# Patient Record
Sex: FEMALE | Race: WHITE | Employment: OTHER | URBAN - METROPOLITAN AREA
[De-identification: names, ages, dates, MRNs, and addresses within clinical notes are randomized per-mention and may not be internally consistent; named-entity substitution may affect disease eponyms.]

---

## 2017-03-01 ENCOUNTER — OFFICE VISIT (OUTPATIENT)
Dept: CARDIOLOGY CLINIC | Age: 67
End: 2017-03-01

## 2017-03-01 VITALS
HEART RATE: 89 BPM | BODY MASS INDEX: 33.23 KG/M2 | HEIGHT: 61 IN | SYSTOLIC BLOOD PRESSURE: 138 MMHG | WEIGHT: 176 LBS | DIASTOLIC BLOOD PRESSURE: 81 MMHG

## 2017-03-01 DIAGNOSIS — I50.32 DIASTOLIC CHF, CHRONIC (HCC): ICD-10-CM

## 2017-03-01 DIAGNOSIS — I50.31 ACUTE DIASTOLIC CHF (CONGESTIVE HEART FAILURE) (HCC): Primary | ICD-10-CM

## 2017-03-01 DIAGNOSIS — E66.9 OBESITY (BMI 30.0-34.9): ICD-10-CM

## 2017-03-01 DIAGNOSIS — I10 ESSENTIAL HYPERTENSION: ICD-10-CM

## 2017-03-01 DIAGNOSIS — E78.00 PURE HYPERCHOLESTEROLEMIA: ICD-10-CM

## 2017-03-01 RX ORDER — NEBIVOLOL 10 MG/1
10 TABLET ORAL DAILY
Qty: 30 TAB | Refills: 1 | Status: SHIPPED | OUTPATIENT
Start: 2017-03-01 | End: 2017-09-08

## 2017-03-01 RX ORDER — AA/PROT/LYSINE/METHIO/VIT C/B6 50-12.5 MG
TABLET ORAL
COMMUNITY
End: 2017-10-19

## 2017-03-01 RX ORDER — ROSUVASTATIN CALCIUM 5 MG/1
5 TABLET, COATED ORAL
Qty: 30 TAB | Refills: 3 | Status: SHIPPED | OUTPATIENT
Start: 2017-03-01 | End: 2017-10-19

## 2017-03-01 NOTE — MR AVS SNAPSHOT
Visit Information Date & Time Provider Department Dept. Phone Encounter #  
 3/1/2017  8:30 AM Jesse Way MD [facility] [address] [place] [address] [id number] Follow-up Instructions Return in about 6 months (around 9/1/2017), or if symptoms worsen or fail to improve. Your Appointments 9/14/2017  8:00 AM  
ESTABLISHED PATIENT with Jesse Way MD  
[facility]) Appt Note: 6 months post labs [address] [facility], [address] [facility] [address]  
[address] [facility], [address] [place] [address] Upcoming Health Maintenance Date Due Hepatitis C Screening 1950 DTaP/Tdap/Td series (1 - Tdap) 8/25/1972 BREAST CANCER SCRN MAMMOGRAM 8/25/2001 ZOSTER VACCINE AGE 60> 8/25/2011 GLAUCOMA SCREENING Q2Y 8/25/2016 OSTEOPOROSIS SCREENING (DEXA) 8/25/2016 Pneumococcal 65+ Low/Medium Risk (1 of 2 - PCV13) 8/25/2016 MEDICARE YEARLY EXAM 8/25/2016 COLONOSCOPY 10/17/2022 Allergies as of 3/1/2017  Review Complete On: 3/1/2017 By: Jesse Way MD  
  
 Severity Noted Reaction Type Reactions Lipitor [Atorvastatin]  03/01/2017    Myalgia Quit 2/17 Nsaids (Non-steroidal Anti-inflammatory Drug)  10/17/2012    Other (comments) Severe abdominal pain  
 Pcn [Penicillins]  10/17/2012    Swelling Swelling, hives & photosensitive rxn Current Immunizations  Reviewed on 10/28/2016 Name Date Influenza Vaccine 9/19/2016 Poliovirus vaccine 5/14/1998 Not reviewed this visit You Were Diagnosed With   
  
 Codes Comments Acute diastolic CHF (congestive heart failure) (HCC)    -  Primary ICD-10-CM: I50.31 ICD-9-CM: 428.31, 428.0 3/17 resolved; 11/16 resolved clinically by reducing fluid intake Diastolic CHF, chronic (HCC)     ICD-10-CM: I50.32 
ICD-9-CM: 428.32, 428.0  Pure hypercholesterolemia     ICD-10-CM: E78.00 
 ICD-9-CM: 272.0 3/17 try crestor; 5/15 VKJ56; 2378 [id number] Obesity (BMI 30.0-34.9)     ICD-10-CM: K81.3 ICD-9-CM: 278.00 Weight loss has been strongly encouraged by following dietary restrictions and an exercise routine. Essential hypertension     ICD-10-CM: I10 
ICD-9-CM: 061.7 /48 try bystolic rather than labetolol as she forgets 2 nd dose Vitals BP  
  
  
  
  
  
 138/81 (BP 1 Location: Left arm, BP Patient Position: At rest) Vitals History BMI and BSA Data Body Mass Index Body Surface Area  
 33.25 kg/m 2 1.85 m 2 Preferred Pharmacy Pharmacy Name Phone [address], [phone] [facility] [phone] Your Updated Medication List  
  
   
This list is accurate as of: 3/1/17  9:27 AM.  Always use your most recent med list.  
  
  
  
  
 benazepril 20 mg tablet Commonly known as:  LOTENSIN  
1 tablet  per day CO Q-10 10 mg Cap Generic drug:  coenzyme q10 Take  by mouth. DEXILANT 60 mg Cpdb Generic drug:  Dexlansoprazole Take  by mouth Daily (before breakfast). GAVISCON EXTRA STRENGTH 160-105 mg Jenean Parrot Generic drug:  aluminum hydrox-magnesium carb Take 2 Tabs by mouth as needed. LORazepam 0.5 mg tablet Commonly known as:  ATIVAN Take 1 Tab by mouth two (2) times a day. Max Daily Amount: 1 mg.  
  
 nebivolol 10 mg tablet Commonly known as:  BYSTOLIC Take 1 Tab by mouth daily. OMEGA 3 PO Take  by mouth. RESTASIS 0.05 % ophthalmic emulsion Generic drug:  cycloSPORINE Administer 1 Drop to both eyes two (2) times a day. rosuvastatin 5 mg tablet Commonly known as:  CRESTOR Take 1 Tab by mouth nightly. TYLENOL EXTRA STRENGTH 500 mg tablet Generic drug:  acetaminophen Take 1,000 mg by mouth every six (6) hours as needed. Prescriptions Sent to Pharmacy  Refills  
 nebivolol (BYSTOLIC) 10 mg tablet 1  
 Sig: Take 1 Tab by mouth daily. Class: Normal  
 Pharmacy: [address], [address] [place] [address] Ph #: [phone] Route: Oral  
 rosuvastatin (CRESTOR) 5 mg tablet 3 Sig: Take 1 Tab by mouth nightly. Class: Normal  
 Pharmacy: [address], [address] [place] [place] [address] Ph #: [phone] Route: Oral  
  
We Performed the Following [facility] [employer] BP FLOWSHEET [[id number] CPT(R)] Follow-up Instructions Return in about 6 months (around 9/1/2017), or if symptoms worsen or fail to improve. To-Do List   
 Around 04/05/2017 Lab:  HEPATIC FUNCTION PANEL Around 04/05/2017 Lab:  LIPID PANEL Patient Instructions After the recommended changes have been made in blood pressure medicines, patient advised to keep BP/HR(pulse rate) chart twice daily and bring us results in next 2 weeks or so. Patient may send the results via \"My Chart\" if desired. Please rest for 5-10 minutes before checking blood pressure Medications Discontinued During This Encounter Medication Reason  aspirin delayed-release 81 mg tablet Therapy Completed  atorvastatin (LIPITOR) 40 mg tablet Side Effects  labetalol (NORMODYNE) 200 mg tablet Other Orders Placed This Encounter  LIPID PANEL Standing Status:   Future Standing Expiration Date:   9/1/2017  
 HEPATIC FUNCTION PANEL Standing Status:   Future Standing Expiration Date:   9/1/2017  
 [facility] [employer] BP FLOWSHEET Order Specific Question:   After how many readings would you like to receive a notification of this patient's entries? Answer:   14 Order Specific Question:   What is the highest normal systolic value for this patient? (An add'l alert will be sent if value exceeds this range.) Answer:   180   Order Specific Question:   What is the highest normal diastolic value for this patient? (An add'l alert will be sent if values exceed this range.) Answer:   90  
 nebivolol (BYSTOLIC) 10 mg tablet Sig: Take 1 Tab by mouth daily. Dispense:  30 Tab Refill:  1  rosuvastatin (CRESTOR) 5 mg tablet Sig: Take 1 Tab by mouth nightly. Dispense:  30 Tab Refill:  3 Body Mass Index: Care Instructions Your Care Instructions Body mass index (BMI) can help you see if your weight is raising your risk for health problems. It uses a formula to compare how much you weigh with how tall you are. A BMI between 18.5 and 24.9 is considered healthy. A BMI between 25 and 29.9 is considered overweight. A BMI of 30 or higher is considered obese. If your BMI is in the normal range, it means that you have a lower risk for weight-related health problems. If your BMI is in the overweight or obese range, you may be at increased risk for weight-related health problems, such as high blood pressure, heart disease, stroke, arthritis or joint pain, and diabetes. BMI is just one measure of your risk for weight-related health problems. You may be at higher risk for health problems if you are not active, you eat an unhealthy diet, or you drink too much alcohol or use tobacco products. Follow-up care is a key part of your treatment and safety. Be sure to make and go to all appointments, and call your doctor if you are having problems. It's also a good idea to know your test results and keep a list of the medicines you take. How can you care for yourself at home? · Practice healthy eating habits. This includes eating plenty of fruits, vegetables, whole grains, lean protein, and low-fat dairy. · Get at least 30 minutes of exercise 5 days a week or more. Brisk walking is a good choice. You also may want to do other activities, such as running, swimming, cycling, or playing tennis or team sports. · Do not smoke. Smoking can increase your risk for health problems.  If you need help quitting, talk to your doctor about stop-smoking programs and medicines. These can increase your chances of quitting for good. · Limit alcohol to 2 drinks a day for men and 1 drink a day for women. Too much alcohol can cause health problems. If you have a BMI higher than 25 · Your doctor may do other tests to check your risk for weight-related health problems. This may include measuring the distance around your waist. A waist measurement of more than 40 inches in men or 35 inches in women can increase the risk of weight-related health problems. · Talk with your doctor about steps you can take to stay healthy or improve your health. You may need to make lifestyle changes to lose weight and stay healthy, such as changing your diet and getting regular exercise. Where can you learn more? Go to http://christina-jose de jesus.info/. Enter S176 in the search box to learn more about \"Body Mass Index: Care Instructions. \" Current as of: February 16, 2016 Content Version: 11.1 © 9409-1325 [employer]. Care instructions adapted under license by [employer] (which disclaims liability or warranty for this information). If you have questions about a medical condition or this instruction, always ask your healthcare professional. [place] [address] any warranty or liability for your use of this information. Introducing [facility]! Dear Sarahy Fernandez: Thank you for requesting a OONi account. Our records indicate that you already have an active OONi account. You can access your account anytime at [email or web address]. [facility]/[employer] Did you know that you can access your hospital and ER discharge instructions at any time in OONi? You can also review all of your test results from your hospital stay or ER visit. Additional Information If you have questions, please visit the Frequently Asked Questions section of the OONi website at [email or web address]. [facility]/[employer]/. Remember, [employer] is NOT to be used for urgent needs. For medical emergencies, dial 911. Now available from your iPhone and Android! Please provide this summary of care documentation to your next provider. Your primary care clinician is listed as Wendy Jo. If you have any questions after today's visit, please call [phone].

## 2017-03-01 NOTE — PROGRESS NOTES
HISTORY OF PRESENT ILLNESS  Rhea Miller is a 73 y.o. female. HPI Comments: 11/16 seen for abn EKG, h/o 45-50%EF; had jaw pain and left neck discomfort- worse with bending over  10/16 had SOB/edema- improved now. Had high BP which is being treated now. Was drinking extra fluids due to renal stone which has now been passed and will not need lithotripsy now        Cholesterol Problem   The history is provided by the medical records. This is a chronic problem. Associated symptoms include shortness of breath. Hypertension   The history is provided by the patient. This is a new problem. The current episode started more than 1 week ago (treatment started 10/16 again after some yrs). Associated symptoms include shortness of breath. Leg Swelling   The history is provided by the patient. This is a chronic problem. The current episode started more than 1 week ago. The problem occurs daily. Associated symptoms include shortness of breath. The symptoms are aggravated by standing. The symptoms are relieved by sleep. Shortness of Breath   The history is provided by the patient. This is a new problem. The problem occurs rarely (3 times/yr). The current episode started more than 1 week ago (yrs). The problem has not changed since onset. Pertinent negatives include no fever, no cough, no wheezing, no PND, no orthopnea, no vomiting, no rash, no leg swelling and no claudication. The problem's precipitants include exercise (walking across parking lot; 11/16 improving; 3/17 2flights but can walk >2 miles). Review of Systems   Constitutional: Negative for chills, fever, malaise/fatigue and weight loss. HENT: Negative for nosebleeds. Eyes: Negative for discharge. Respiratory: Positive for shortness of breath. Negative for cough and wheezing. Cardiovascular: Negative for palpitations, orthopnea, claudication, leg swelling and PND. Gastrointestinal: Negative for diarrhea, nausea and vomiting.    Genitourinary: Negative for dysuria and hematuria. Musculoskeletal: Negative for joint pain. Skin: Negative for rash. Neurological: Negative for dizziness, seizures and loss of consciousness. Endo/Heme/Allergies: Negative for polydipsia. Does not bruise/bleed easily. Psychiatric/Behavioral: Negative for depression and substance abuse. The patient does not have insomnia. Allergies   Allergen Reactions    Nsaids (Non-Steroidal Anti-Inflammatory Drug) Other (comments)     Severe abdominal pain    Pcn [Penicillins] Swelling     Swelling, hives & photosensitive rxn       Past Medical History:   Diagnosis Date    Acute reaction to stress     Closed fracture of left wrist     Diastolic dysfunction     Fatty liver     GERD (gastroesophageal reflux disease)     Hyperlipidemia     Hypertension     diastolic dysfunction    Hypoglycemia     Kidney stones     11/16 for yrs; spon passage always so far    RSD (reflex sympathetic dystrophy) 8/2009    no blood pressure or sticks in left arm    Syncope 2000    no recurrence since; was orthostatic at that time    Unspecified adverse effect of anesthesia     hard to sedate       Family History   Problem Relation Age of Onset    Diabetes Maternal Aunt     Cancer Maternal Uncle      throat    Cancer Maternal Grandmother      melanoma    Heart Disease Other     Heart Attack Paternal Grandmother 48    Stroke Neg Hx        Social History   Substance Use Topics    Smoking status: Former Smoker     Packs/day: 0.50     Years: 11.00     Quit date: 1/1/2003    Smokeless tobacco: Never Used    Alcohol use 0.6 oz/week     1 Glasses of wine per week      Comment: rarely- 3-4 drinks/yr        Current Outpatient Prescriptions   Medication Sig    coenzyme q10 (CO Q-10) 10 mg cap Take  by mouth.  FLAXSEED OIL (OMEGA 3 PO) Take  by mouth.  Dexlansoprazole (DEXILANT) 60 mg CpDB Take  by mouth Daily (before breakfast).     cycloSPORINE (RESTASIS) 0.05 % ophthalmic emulsion Administer 1 Drop to both eyes two (2) times a day.  labetalol (NORMODYNE) 200 mg tablet 1 tablet twice per day    benazepril (LOTENSIN) 20 mg tablet 1 tablet  per day    LORazepam (ATIVAN) 0.5 mg tablet Take 1 Tab by mouth two (2) times a day. Max Daily Amount: 1 mg. (Patient taking differently: Take 0.5 mg by mouth as needed.)    aluminum hydrox-magnesium carb (GAVISCON EXTRA STRENGTH) 160-105 mg chew Take 2 Tabs by mouth as needed.  acetaminophen (TYLENOL EXTRA STRENGTH) 500 mg tablet Take 1,000 mg by mouth every six (6) hours as needed. No current facility-administered medications for this visit. Past Surgical History:   Procedure Laterality Date    HX CARPAL TUNNEL RELEASE      HX GYN      lap. fibroid removal    HX HEART CATHETERIZATION  2005 approx    normal coronaries per pt    HX ORTHOPAEDIC      ORIF left wrist    HX SHOULDER ARTHROSCOPY Left 1988       Diagnostic Studies: Old records reviewed and show as follows  EKG tracings reviewed by me today. No flowsheet data found. 11/16 Nuc Stress  Conclusion:    1. Normal perfusion scan.    2. Normal wall motion and ejection fraction.    3. Low risk scan. Visit Vitals    /81 (BP 1 Location: Left arm, BP Patient Position: At rest)    Pulse 89    Ht 5' 1\" (1.549 m)    Wt 79.8 kg (176 lb)    BMI 33.25 kg/m2       Ms. Silvano De La Rosa has a reminder for a \"due or due soon\" health maintenance. I have asked that she contact her primary care provider for follow-up on this health maintenance. Physical Exam   Constitutional: She is oriented to person, place, and time. She appears well-developed and well-nourished. No distress. obese   HENT:   Head: Normocephalic and atraumatic. Mouth/Throat: Normal dentition. Eyes: Right eye exhibits no discharge. Left eye exhibits no discharge. No scleral icterus. Neck: Neck supple. No JVD present. Carotid bruit is not present. No thyromegaly present.    Cardiovascular: Normal rate, regular rhythm, S1 normal, S2 normal, normal heart sounds and intact distal pulses. Exam reveals no gallop and no friction rub. No murmur heard. Pulmonary/Chest: Effort normal and breath sounds normal. She has no wheezes. She has no rales. Abdominal: Soft. She exhibits no mass. There is no tenderness. Musculoskeletal: She exhibits no edema. Lymphadenopathy:        Right cervical: No superficial cervical adenopathy present. Left cervical: No superficial cervical adenopathy present. Neurological: She is alert and oriented to person, place, and time. Skin: Skin is warm and dry. No rash noted. Psychiatric: She has a normal mood and affect. Her behavior is normal.       ASSESSMENT and PLAN        Derrick King was seen today for cholesterol problem, hypertension, leg swelling and shortness of breath. Diagnoses and all orders for this visit:    Acute diastolic CHF (congestive heart failure) ([address]  Comments:  3/17 resolved; 11/16 resolved clinically by reducing fluid intake      Diastolic CHF, chronic ([facility]    Pure hypercholesterolemia  Comments:  3/17 try crestor; 5/15 LDL92; 2014 [id number]  Orders:  -     rosuvastatin (CRESTOR) 5 mg tablet; Take 1 Tab by mouth nightly. -     LIPID PANEL; Future  -     HEPATIC FUNCTION PANEL; Future    Obesity (BMI 30.0-34. 9)  Comments:  Weight loss has been strongly encouraged by following dietary restrictions and an exercise routine. Essential hypertension  Comments:  /55 try bystolic rather than labetolol as she forgets 2 nd dose  Orders:  -     nebivolol (BYSTOLIC) 10 mg tablet; Take 1 Tab by mouth daily.  -     [facility] BP FLOWSHEET        Pertinent laboratory and test data reviewed and discussed with patient.   See patient instructions also for other medical advice given    Medications Discontinued During This Encounter   Medication Reason    aspirin delayed-release 81 mg tablet Therapy Completed    atorvastatin (LIPITOR) 40 mg tablet Side Effects    labetalol (NORMODYNE) 200 mg tablet Other       Follow-up Disposition:  Return in about 6 months (around 9/1/2017), or if symptoms worsen or fail to improve.

## 2017-03-01 NOTE — PATIENT INSTRUCTIONS
After the recommended changes have been made in blood pressure medicines, patient advised to keep BP/HR(pulse rate) chart twice daily and bring us results in next 2 weeks or so. Patient may send the results via \"My Chart\" if desired. Please rest for 5-10 minutes before checking blood pressure  Medications Discontinued During This Encounter   Medication Reason    aspirin delayed-release 81 mg tablet Therapy Completed    atorvastatin (LIPITOR) 40 mg tablet Side Effects    labetalol (NORMODYNE) 200 mg tablet Other       Orders Placed This Encounter    LIPID PANEL     Standing Status:   Future     Standing Expiration Date:   9/1/2017    HEPATIC FUNCTION PANEL     Standing Status:   Future     Standing Expiration Date:   9/1/2017    [facility] BP FLOWSHEET     Order Specific Question:   After how many readings would you like to receive a notification of this patient's entries? Answer:   15     Order Specific Question:   What is the highest normal systolic value for this patient? (An add'l alert will be sent if value exceeds this range.)     Answer:   180     Order Specific Question:   What is the highest normal diastolic value for this patient? (An add'l alert will be sent if values exceed this range.)     Answer:   90    nebivolol (BYSTOLIC) 10 mg tablet     Sig: Take 1 Tab by mouth daily. Dispense:  30 Tab     Refill:  1    rosuvastatin (CRESTOR) 5 mg tablet     Sig: Take 1 Tab by mouth nightly. Dispense:  30 Tab     Refill:  3          Body Mass Index: Care Instructions  Your Care Instructions    Body mass index (BMI) can help you see if your weight is raising your risk for health problems. It uses a formula to compare how much you weigh with how tall you are. A BMI between 18.5 and 24.9 is considered healthy. A BMI between 25 and 29.9 is considered overweight. A BMI of 30 or higher is considered obese.   If your BMI is in the normal range, it means that you have a lower risk for weight-related health problems. If your BMI is in the overweight or obese range, you may be at increased risk for weight-related health problems, such as high blood pressure, heart disease, stroke, arthritis or joint pain, and diabetes. BMI is just one measure of your risk for weight-related health problems. You may be at higher risk for health problems if you are not active, you eat an unhealthy diet, or you drink too much alcohol or use tobacco products. Follow-up care is a key part of your treatment and safety. Be sure to make and go to all appointments, and call your doctor if you are having problems. It's also a good idea to know your test results and keep a list of the medicines you take. How can you care for yourself at home? · Practice healthy eating habits. This includes eating plenty of fruits, vegetables, whole grains, lean protein, and low-fat dairy. · Get at least 30 minutes of exercise 5 days a week or more. Brisk walking is a good choice. You also may want to do other activities, such as running, swimming, cycling, or playing tennis or team sports. · Do not smoke. Smoking can increase your risk for health problems. If you need help quitting, talk to your doctor about stop-smoking programs and medicines. These can increase your chances of quitting for good. · Limit alcohol to 2 drinks a day for men and 1 drink a day for women. Too much alcohol can cause health problems. If you have a BMI higher than 25  · Your doctor may do other tests to check your risk for weight-related health problems. This may include measuring the distance around your waist. A waist measurement of more than 40 inches in men or 35 inches in women can increase the risk of weight-related health problems. · Talk with your doctor about steps you can take to stay healthy or improve your health. You may need to make lifestyle changes to lose weight and stay healthy, such as changing your diet and getting regular exercise.   Where can you learn more?  Go to http://christina-jose de jesus.info/. Enter S176 in the search box to learn more about \"Body Mass Index: Care Instructions. \"  Current as of: February 16, 2016  Content Version: 11.1  © 7014-2076 [employer]. Care instructions adapted under license by [employer] (which disclaims liability or warranty for this information). If you have questions about a medical condition or this instruction, always ask your healthcare professional. [address] any warranty or liability for your use of this information.

## 2017-03-01 NOTE — LETTER
Gonzalo Tamara 1950 3/1/2017 Dear Cass Feliciano MD 
 
I had the pleasure of evaluating  Ms. Beatrice Villagran in office today. Below are the relevant portions of my assessment and plan of care. ICD-10-CM ICD-9-CM 1. Acute diastolic CHF (congestive heart failure) ([address] I50.31 428.31   
  428.0   
 3/17 resolved; 11/16 resolved clinically by reducing fluid intake 2. Diastolic CHF, chronic (HCC) I50.32 428.32   
  428.0 3. Pure hypercholesterolemia E78.00 272.0 rosuvastatin (CRESTOR) 5 mg tablet LIPID PANEL  
   HEPATIC FUNCTION PANEL  
 3/17 try crestor; 5/15 LDL92; 2014 [id number]  
4. Obesity (BMI 30.0-34. 9) E66.9 278.00 Weight loss has been strongly encouraged by following dietary restrictions and an exercise routine. 5. Essential hypertension I10 401.9 nebivolol (BYSTOLIC) 10 mg tablet [facility]  
 /37 try bystolic rather than labetolol as she forgets 2 nd dose Current Outpatient Prescriptions Medication Sig Dispense Refill  coenzyme q10 (CO Q-10) 10 mg cap Take  by mouth.  nebivolol (BYSTOLIC) 10 mg tablet Take 1 Tab by mouth daily. 30 Tab 1  
 rosuvastatin (CRESTOR) 5 mg tablet Take 1 Tab by mouth nightly. 30 Tab 3  FLAXSEED OIL (OMEGA 3 PO) Take  by mouth.  Dexlansoprazole (DEXILANT) 60 mg CpDB Take  by mouth Daily (before breakfast).  cycloSPORINE (RESTASIS) 0.05 % ophthalmic emulsion Administer 1 Drop to both eyes two (2) times a day.  benazepril (LOTENSIN) 20 mg tablet 1 tablet  per day 30 Tab 2  
 LORazepam (ATIVAN) 0.5 mg tablet Take 1 Tab by mouth two (2) times a day. Max Daily Amount: 1 mg. (Patient taking differently: Take 0.5 mg by mouth as needed.) 60 Tab 3  
 aluminum hydrox-magnesium carb (GAVISCON EXTRA STRENGTH) 160-105 mg chew Take 2 Tabs by mouth as needed.  acetaminophen (TYLENOL EXTRA STRENGTH) 500 mg tablet Take 1,000 mg by mouth every six (6) hours as needed. Orders Placed This Encounter  LIPID PANEL Standing Status:   Future Standing Expiration Date:   9/1/2017  
 HEPATIC FUNCTION PANEL Standing Status:   Future Standing Expiration Date:   9/1/2017  
 BSI MYCHART BP FLOWSHEET Order Specific Question:   After how many readings would you like to receive a notification of this patient's entries? Answer:   14 Order Specific Question:   What is the highest normal systolic value for this patient? (An add'l alert will be sent if value exceeds this range.) Answer:   180 Order Specific Question:   What is the highest normal diastolic value for this patient? (An add'l alert will be sent if values exceed this range.) Answer:   90  
 coenzyme q10 (CO Q-10) 10 mg cap Sig: Take  by mouth.  nebivolol (BYSTOLIC) 10 mg tablet Sig: Take 1 Tab by mouth daily. Dispense:  30 Tab Refill:  1  rosuvastatin (CRESTOR) 5 mg tablet Sig: Take 1 Tab by mouth nightly. Dispense:  30 Tab Refill:  3 If you have questions, please do not hesitate to call me. I look forward to following Ms. Claus Benito along with you. Sincerely, Jesse Way MD

## 2017-03-01 NOTE — PROGRESS NOTES
1. Have you been to the ER, urgent care clinic since your last visit? Hospitalized since your last visit? No    2. Have you seen or consulted any other health care providers outside of the [facility] since your last visit? Include any pap smears or colon screening. No     3. Since your last visit, have you had any of the following symptoms? SOB/ swelling in legs      4. Have you had any blood work, X-rays or cardiac testing?     Patient did not completed Lipid and LFT / will need new orders         Medications confirmed verbally by patient

## 2017-04-05 DIAGNOSIS — E78.00 PURE HYPERCHOLESTEROLEMIA: ICD-10-CM

## 2017-04-28 RX ORDER — BENAZEPRIL HYDROCHLORIDE 20 MG/1
TABLET ORAL
Qty: 30 TAB | Refills: 0 | Status: SHIPPED | OUTPATIENT
Start: 2017-04-28 | End: 2017-06-19 | Stop reason: SDUPTHER

## 2017-05-02 RX ORDER — ALBUTEROL SULFATE 90 UG/1
1 AEROSOL, METERED RESPIRATORY (INHALATION)
Qty: 1 INHALER | Refills: 5 | Status: SHIPPED | OUTPATIENT
Start: 2017-05-02 | End: 2019-05-31 | Stop reason: ALTCHOICE

## 2017-05-02 NOTE — TELEPHONE ENCOUNTER
Patient states she is having allergy issues and is having bronchospasms. She is requesting albuterol inhaler. She has used this in the past when this has happened, but it's been a few years. She would like it sent to [facility].

## 2017-06-09 ENCOUNTER — TELEPHONE (OUTPATIENT)
Dept: INTERNAL MEDICINE CLINIC | Age: 67
End: 2017-06-09

## 2017-06-09 RX ORDER — PREDNISONE 20 MG/1
TABLET ORAL
Qty: 20 TAB | Refills: 0 | Status: SHIPPED | OUTPATIENT
Start: 2017-06-09 | End: 2017-09-08

## 2017-06-09 RX ORDER — GABAPENTIN 300 MG/1
CAPSULE ORAL
Qty: 60 CAP | Refills: 0 | Status: SHIPPED | OUTPATIENT
Start: 2017-06-09 | End: 2017-09-08

## 2017-06-09 NOTE — TELEPHONE ENCOUNTER
Patient states she is having sciatic pain down left leg. She is going to [facility] to see family for a week and will be doing a lot of walking. She is requesting a short term prescription for prednisone and Neurontin to be sent to [facility]. She has taking these in the past for her wrist.  She is also using ice and heat.

## 2017-06-19 RX ORDER — LABETALOL 200 MG/1
TABLET, FILM COATED ORAL
Qty: 60 TAB | Refills: 0 | Status: SHIPPED | OUTPATIENT
Start: 2017-06-19 | End: 2017-07-18 | Stop reason: SDUPTHER

## 2017-06-19 RX ORDER — BENAZEPRIL HYDROCHLORIDE 20 MG/1
TABLET ORAL
Qty: 30 TAB | Refills: 0 | Status: SHIPPED | OUTPATIENT
Start: 2017-06-19 | End: 2017-07-24 | Stop reason: SDUPTHER

## 2017-06-22 NOTE — TELEPHONE ENCOUNTER
Verbal order received for clindamycin 300 tid  #20 from Dr Anamaria Medley order read back and confirmed.

## 2017-06-22 NOTE — TELEPHONE ENCOUNTER
Patient states her dog nipped her last night and she has 2 puncture bites on her hand. She cleaned it and put antibiotic ointment on it. She's concerned her immune system is suppressed because she just finished taking a large round of prednisone for sciatic pain. She thinks she may need an antibiotic.

## 2017-06-26 RX ORDER — CLINDAMYCIN HYDROCHLORIDE 300 MG/1
300 CAPSULE ORAL 3 TIMES DAILY
Qty: 20 CAP | Refills: 0 | OUTPATIENT
Start: 2017-06-26 | End: 2017-07-03

## 2017-07-18 ENCOUNTER — TELEPHONE (OUTPATIENT)
Dept: CARDIOLOGY CLINIC | Age: 67
End: 2017-07-18

## 2017-07-18 DIAGNOSIS — I50.32 CHRONIC DIASTOLIC CONGESTIVE HEART FAILURE (HCC): Primary | ICD-10-CM

## 2017-07-18 RX ORDER — LABETALOL 200 MG/1
TABLET, FILM COATED ORAL
Qty: 60 TAB | Refills: 3 | Status: SHIPPED | OUTPATIENT
Start: 2017-07-18 | End: 2018-04-12 | Stop reason: SDUPTHER

## 2017-07-18 RX ORDER — FUROSEMIDE 20 MG/1
20 TABLET ORAL
Qty: 30 TAB | Refills: 2 | Status: SHIPPED | OUTPATIENT
Start: 2017-07-18 | End: 2018-07-06 | Stop reason: SDUPTHER

## 2017-07-18 NOTE — TELEPHONE ENCOUNTER
Pt states you told her she could take Lasix as needed but don't see documentation in chart. Please advise, and if so, she will need a refill sent to [facility] pharmacy.

## 2017-07-24 RX ORDER — BENAZEPRIL HYDROCHLORIDE 20 MG/1
TABLET ORAL
Qty: 30 TAB | Refills: 0 | Status: SHIPPED | OUTPATIENT
Start: 2017-07-24 | End: 2017-07-25 | Stop reason: SINTOL

## 2017-07-25 DIAGNOSIS — I10 ESSENTIAL HYPERTENSION: Primary | ICD-10-CM

## 2017-07-25 RX ORDER — BENAZEPRIL HYDROCHLORIDE 20 MG/1
TABLET ORAL
Qty: 30 TAB | Refills: 0 | OUTPATIENT
Start: 2017-07-25

## 2017-07-25 RX ORDER — LOSARTAN POTASSIUM 100 MG/1
100 TABLET ORAL DAILY
Qty: 30 TAB | Refills: 5 | Status: SHIPPED | OUTPATIENT
Start: 2017-07-25 | End: 2018-06-26 | Stop reason: SDUPTHER

## 2017-09-08 ENCOUNTER — OFFICE VISIT (OUTPATIENT)
Dept: INTERNAL MEDICINE CLINIC | Age: 67
End: 2017-09-08

## 2017-09-08 VITALS
RESPIRATION RATE: 16 BRPM | HEART RATE: 88 BPM | SYSTOLIC BLOOD PRESSURE: 120 MMHG | DIASTOLIC BLOOD PRESSURE: 68 MMHG | BODY MASS INDEX: 33.42 KG/M2 | TEMPERATURE: 98.2 F | OXYGEN SATURATION: 98 % | HEIGHT: 61 IN | WEIGHT: 177 LBS

## 2017-09-08 DIAGNOSIS — I10 ESSENTIAL HYPERTENSION: ICD-10-CM

## 2017-09-08 DIAGNOSIS — J06.9 UPPER RESPIRATORY TRACT INFECTION, UNSPECIFIED TYPE: Primary | ICD-10-CM

## 2017-09-08 RX ORDER — CODEINE PHOSPHATE AND GUAIFENESIN 10; 100 MG/5ML; MG/5ML
5 SOLUTION ORAL
Qty: 118 ML | Refills: 0 | Status: SHIPPED | OUTPATIENT
Start: 2017-09-08 | End: 2017-10-30 | Stop reason: ALTCHOICE

## 2017-09-08 RX ORDER — AZITHROMYCIN 250 MG/1
TABLET, FILM COATED ORAL
Qty: 6 TAB | Refills: 0 | Status: SHIPPED | OUTPATIENT
Start: 2017-09-08 | End: 2017-10-19

## 2017-09-08 NOTE — PROGRESS NOTES
Joie Sanabria is a 78 y.o. female presenting today for Nasal Congestion (x8 days)  . HPI:  Joie Sanabria presents to the office today for sore throat, nasal congestion, sinus pressure, facial pain and productive cough x 8 days that is progressively worsening. Review of Systems   Constitutional: Positive for chills. Negative for fever. HENT: Positive for congestion, sinus pain and sore throat. Respiratory: Positive for cough and sputum production. Negative for shortness of breath and wheezing. Cardiovascular: Negative for chest pain and palpitations. Gastrointestinal: Negative for nausea and vomiting. Allergies   Allergen Reactions    Lipitor [Atorvastatin] Myalgia     Quit 2/17    Nsaids (Non-Steroidal Anti-Inflammatory Drug) Other (comments)     Severe abdominal pain    Pcn [Penicillins] Swelling     Swelling, hives & photosensitive rxn       Current Outpatient Prescriptions   Medication Sig Dispense Refill    azithromycin (ZITHROMAX) 250 mg tablet Take 2 tablets today, then take 1 tablet daily 6 Tab 0    guaiFENesin-codeine (ROBITUSSIN AC) 100-10 mg/5 mL solution Take 5 mL by mouth three (3) times daily as needed for Cough. Max Daily Amount: 15 mL. Do not drive if taking this medication 118 mL 0    losartan (COZAAR) 100 mg tablet Take 1 Tab by mouth daily. 30 Tab 5    labetalol (NORMODYNE) 200 mg tablet TAKE ONE TABLET BY MOUTH 2 TIMES A DAY 60 Tab 03    furosemide (LASIX) 20 mg tablet Take 1 Tab by mouth daily as needed. 30 Tab 2    albuterol (PROVENTIL HFA, VENTOLIN HFA, PROAIR HFA) 90 mcg/actuation inhaler Take 1 Puff by inhalation every four (4) hours as needed for Wheezing. 1 Inhaler 5    rosuvastatin (CRESTOR) 5 mg tablet Take 1 Tab by mouth nightly. 30 Tab 3    FLAXSEED OIL (OMEGA 3 PO) Take  by mouth.  Dexlansoprazole (DEXILANT) 60 mg CpDB Take  by mouth Daily (before breakfast).       cycloSPORINE (RESTASIS) 0.05 % ophthalmic emulsion Administer 1 Drop to both eyes two (2) times a day.  LORazepam (ATIVAN) 0.5 mg tablet Take 1 Tab by mouth two (2) times a day. Max Daily Amount: 1 mg. (Patient taking differently: Take 0.5 mg by mouth as needed.) 60 Tab 3    aluminum hydrox-magnesium carb (GAVISCON EXTRA STRENGTH) 160-105 mg chew Take 2 Tabs by mouth as needed.  acetaminophen (TYLENOL EXTRA STRENGTH) 500 mg tablet Take 1,000 mg by mouth every six (6) hours as needed.  coenzyme q10 (CO Q-10) 10 mg cap Take  by mouth. Past Medical History:   Diagnosis Date    Acute reaction to stress     Closed fracture of left wrist     Diastolic dysfunction     Fatty liver     GERD (gastroesophageal reflux disease)     Hyperlipidemia     Hypertension     diastolic dysfunction    Hypoglycemia     Kidney stones     11/16 for yrs; spon passage always so far    RSD (reflex sympathetic dystrophy) 8/2009    no blood pressure or sticks in left arm    Syncope 2000    no recurrence since; was orthostatic at that time    Unspecified adverse effect of anesthesia     hard to sedate       Past Surgical History:   Procedure Laterality Date    HX CARPAL TUNNEL RELEASE      HX GYN      lap. fibroid removal    HX HEART CATHETERIZATION  2005 approx    normal coronaries per pt    HX ORTHOPAEDIC      ORIF left wrist    HX SHOULDER ARTHROSCOPY Left 1988       Social History     Social History    Marital status:      Spouse name: N/A    Number of children: N/A    Years of education: N/A     Occupational History    Not on file.      Social History Main Topics    Smoking status: Former Smoker     Packs/day: 0.50     Years: 11.00     Quit date: 1/1/2003    Smokeless tobacco: Never Used    Alcohol use 0.6 oz/week     1 Glasses of wine per week      Comment: rarely- 3-4 drinks/yr    Drug use: No    Sexual activity: Not Currently     Other Topics Concern    Not on file     Social History Narrative       Patient does not have an advanced directive on file    Vitals: 09/08/17 0817   BP: 120/68   Pulse: 88   Resp: 16   Temp: 98.2 °F (36.8 °C)   TempSrc: Tympanic   SpO2: 98%   Weight: 177 lb (80.3 kg)   Height: 5' 1\" (1.549 m)   PainSc:   3   PainLoc: Face       Physical Exam   Constitutional: No distress. HENT:   Head: Normocephalic. Right Ear: Hearing and tympanic membrane normal.   Left Ear: Hearing and tympanic membrane normal.   Mouth/Throat: Posterior oropharyngeal erythema (mild) present. Neck: Neck supple. Cardiovascular: Normal rate, regular rhythm and normal heart sounds. Pulmonary/Chest: Effort normal and breath sounds normal. No respiratory distress. Lymphadenopathy:     She has no cervical adenopathy. Neurological: She is alert. Nursing note and vitals reviewed. No visits with results within 3 Month(s) from this visit. Latest known visit with results is:    Abstract on 11/01/2016   Component Date Value Ref Range Status    LDL-C, External 10/07/2014 157   Final       .No results found for any visits on 09/08/17. Assessment / Plan:      ICD-10-CM ICD-9-CM    1. Upper respiratory tract infection, unspecified type J06.9 465.9 azithromycin (ZITHROMAX) 250 mg tablet      guaiFENesin-codeine (ROBITUSSIN AC) 100-10 mg/5 mL solution   2. Essential hypertension I10 401.9      Azithromycin rx  Cheratussin rx  Fluids  Rest  Continue Sudafed  Continue Flonase  F/u prn      Follow-up Disposition:  Return if symptoms worsen or fail to improve. I asked the patient if she  had any questions and answered her  questions.   The patient stated that she understands the treatment plan and agrees with the treatment plan

## 2017-09-08 NOTE — PROGRESS NOTES
Patient presents for   Chief Complaint   Patient presents with    Nasal Congestion     x8 days     Fall risk assessment was not indicated. Depression screening was not indicated Follow up questions were not indicated. 1. Have you been to the ER, urgent care clinic since your last visit? Hospitalized since your last visit? No    2. Have you seen or consulted any other health care providers outside of the [address] [place] [address] since your last visit? Include any pap smears or colon screening.  No

## 2017-09-08 NOTE — LETTER
NOTIFICATION RETURN TO WORK / SCHOOL 
 
9/8/2017 8:39 AM 
 
Ms. Jennifer Phillips [address] [place] 
[address] [place] [address] To Whom It May Concern: 
 
Jennifer Phillips is currently under the care of [facility] Bobby. She will return to work/school on: 09/09/2017 If there are questions or concerns please have the patient contact our office.  
 
 
 
Sincerely, 
 
 
 
 
 
Sheron Pope, NP

## 2017-10-20 ENCOUNTER — ANESTHESIA EVENT (OUTPATIENT)
Dept: ENDOSCOPY | Age: 67
End: 2017-10-20
Payer: COMMERCIAL

## 2017-10-23 ENCOUNTER — ANESTHESIA (OUTPATIENT)
Dept: ENDOSCOPY | Age: 67
End: 2017-10-23
Payer: COMMERCIAL

## 2017-10-23 ENCOUNTER — HOSPITAL ENCOUNTER (OUTPATIENT)
Dept: MAMMOGRAPHY | Age: 67
Discharge: HOME | End: 2017-10-23
Attending: OBSTETRICS & GYNECOLOGY
Payer: COMMERCIAL

## 2017-10-23 ENCOUNTER — TELEPHONE (OUTPATIENT)
Dept: INTERNAL MEDICINE CLINIC | Age: 67
End: 2017-10-23

## 2017-10-23 ENCOUNTER — HOSPITAL ENCOUNTER (OUTPATIENT)
Age: 67
Setting detail: OUTPATIENT SURGERY
Discharge: HOME | End: 2017-10-23
Attending: INTERNAL MEDICINE | Admitting: INTERNAL MEDICINE
Payer: COMMERCIAL

## 2017-10-23 VITALS
BODY MASS INDEX: 33.04 KG/M2 | WEIGHT: 175 LBS | HEIGHT: 61 IN | OXYGEN SATURATION: 98 % | DIASTOLIC BLOOD PRESSURE: 66 MMHG | SYSTOLIC BLOOD PRESSURE: 93 MMHG | TEMPERATURE: 98.4 F | HEART RATE: 88 BPM | RESPIRATION RATE: 18 BRPM

## 2017-10-23 DIAGNOSIS — Z12.31 VISIT FOR SCREENING MAMMOGRAM: ICD-10-CM

## 2017-10-23 DIAGNOSIS — R92.8 ABNORMAL MAMMOGRAM OF RIGHT BREAST: Primary | ICD-10-CM

## 2017-10-23 PROCEDURE — 74011250636 HC RX REV CODE- 250/636

## 2017-10-23 PROCEDURE — 76060000031 HC ANESTHESIA FIRST 0.5 HR: Performed by: INTERNAL MEDICINE

## 2017-10-23 PROCEDURE — 77067 SCR MAMMO BI INCL CAD: CPT

## 2017-10-23 PROCEDURE — 76040000019: Performed by: INTERNAL MEDICINE

## 2017-10-23 PROCEDURE — 74011250636 HC RX REV CODE- 250/636: Performed by: ANESTHESIOLOGY

## 2017-10-23 RX ORDER — SODIUM CHLORIDE, SODIUM LACTATE, POTASSIUM CHLORIDE, CALCIUM CHLORIDE 600; 310; 30; 20 MG/100ML; MG/100ML; MG/100ML; MG/100ML
75 INJECTION, SOLUTION INTRAVENOUS CONTINUOUS
Status: DISCONTINUED | OUTPATIENT
Start: 2017-10-23 | End: 2017-10-23 | Stop reason: HOSPADM

## 2017-10-23 RX ORDER — PROPOFOL 10 MG/ML
INJECTION, EMULSION INTRAVENOUS AS NEEDED
Status: DISCONTINUED | OUTPATIENT
Start: 2017-10-23 | End: 2017-10-23 | Stop reason: HOSPADM

## 2017-10-23 RX ADMIN — PROPOFOL 30 MG: 10 INJECTION, EMULSION INTRAVENOUS at 14:26

## 2017-10-23 RX ADMIN — PROPOFOL 30 MG: 10 INJECTION, EMULSION INTRAVENOUS at 14:27

## 2017-10-23 RX ADMIN — PROPOFOL 70 MG: 10 INJECTION, EMULSION INTRAVENOUS at 14:24

## 2017-10-23 RX ADMIN — SODIUM CHLORIDE, SODIUM LACTATE, POTASSIUM CHLORIDE, AND CALCIUM CHLORIDE 75 ML/HR: 600; 310; 30; 20 INJECTION, SOLUTION INTRAVENOUS at 14:17

## 2017-10-23 NOTE — H&P
Chief Complaint: GERD and dysphagia    History of present illness: Heartburn from years. PMH:   Past Medical History:   Diagnosis Date    Acute reaction to stress     Closed fracture of left wrist     Diastolic dysfunction     Fatty liver     GERD (gastroesophageal reflux disease)     Hyperlipidemia     Hypertension     diastolic dysfunction    Hypoglycemia     Kidney stones     11/16 for yrs; spon passage always so far    RSD (reflex sympathetic dystrophy) 8/2009    no blood pressure or sticks in left arm    Syncope 2000    no recurrence since; was orthostatic at that time    Unspecified adverse effect of anesthesia     hard to sedate     Allergies: Allergies   Allergen Reactions    Lipitor [Atorvastatin] Myalgia     Quit 2/17    Nsaids (Non-Steroidal Anti-Inflammatory Drug) Other (comments)     Severe abdominal pain    Pcn [Penicillins] Swelling     Swelling, hives & photosensitive rxn     Medications: No current facility-administered medications for this encounter. FH:   Family History   Problem Relation Age of Onset    Diabetes Maternal Aunt     Cancer Maternal Uncle      throat    Cancer Maternal Grandmother      melanoma    Heart Disease Other     Heart Attack Paternal Grandmother 48    Cancer Maternal Grandfather      Melanoma    Stroke Neg Hx      Social:   Social History     Social History    Marital status:      Spouse name: N/A    Number of children: N/A    Years of education: N/A     Social History Main Topics    Smoking status: Former Smoker     Packs/day: 0.50     Years: 11.00     Quit date: 1/1/2003    Smokeless tobacco: Never Used    Alcohol use 0.6 oz/week     1 Glasses of wine per week      Comment: rarely- 3-4 drinks/yr    Drug use: No    Sexual activity: Not Currently     Other Topics Concern    None     Social History Narrative     Surgical H:   Past Surgical History:   Procedure Laterality Date    HX CARPAL TUNNEL RELEASE      HX GYN      lap. fibroid removal    HX HEART CATHETERIZATION  2005 approx    normal coronaries per pt    HX ORTHOPAEDIC      ORIF left wrist    HX SHOULDER ARTHROSCOPY Left 1988       ROS: positive for reflux symptoms    Physical Exam:   Visit Vitals    /89    Pulse 95    Temp 98.4 °F (36.9 °C)    Resp 20    Ht 5' 1\" (1.549 m)    Wt 79.4 kg (175 lb)    SpO2 100%    BMI 33.07 kg/m2     General appearance: alert, no distress  Eyes: pupils equal and reactive, extraocular eye movements intact  Nodes: No gross adenopathy in neck. Skin: no spider angiomata, jaundice, palmar erythema   Respiratory: clear to auscultation bilaterally  Cardiovascular: regular heart rate, no murmurs, no JVD, normal rate and regular rhythm  Abdomen: soft, non-tender, liver not enlarged, spleen not palpable, no obvious ascites  Extremities: no muscle wasting, no gross arthritic changes  Neurologic: alert and oriented, cranial nerves grossly intact, no asterixis    Labs: No results found for this or any previous visit (from the past 24 hour(s)). Imp/ Plan: Will proceed with egd with dilation as planned. Risk benefits alternative including but not limited to infection, bleeding, perforation of viscous, allergic reaction and resultant morbidity and mortality was discussed. Chance of missing a significant lesion due to various reasons were discussed.       Kassandra Taylor MD  [facility], [address]

## 2017-10-23 NOTE — TELEPHONE ENCOUNTER
I called the patient to inform her of the abnormal mammogram.  Will order diagnostic mammogram and US of the right breast

## 2017-10-23 NOTE — IP AVS SNAPSHOT
Jeni Streeter 
 
 
 [address] [id number] [phone] Patient: Jorge Gonzalez MRN: [id number] JYT:9/18/2251 You are allergic to the following Allergen Reactions Lipitor (Atorvastatin) Myalgia Quit 2/17 Nsaids (Non-Steroidal Anti-Inflammatory Drug) Other (comments) Severe abdominal pain  
    
 Pcn (Penicillins) Swelling Swelling, hives & photosensitive rxn Recent Documentation Height Weight BMI OB Status Smoking Status 1.549 m 79.4 kg 33.07 kg/m2 Postmenopausal Former Smoker Emergency Contacts Name Discharge Info Relation Home Work Mobile Ashkan CAREGIVER [3] Friend [5] [phone] Melecio Duncan DISCHARGE CAREGIVER [3] Son [22] [phone] About your hospitalization You were admitted on:  October 23, 2017 You last received care in the:  [facility] You were discharged on:  October 23, 2017 Unit phone number:  [phone] Why you were hospitalized Your primary diagnosis was:  Not on File Providers Seen During Your Hospitalizations Provider Role Specialty Primary office phone Ginny Christian MD Attending Provider Gastroenterology [phone] Your Primary Care Physician (PCP) Primary Care Physician Office Phone Office Fax [phone], [address] [place] [address] [phone] Follow-up Information None Current Discharge Medication List  
  
ASK your doctor about these medications Dose & Instructions Dispensing Information Comments Morning Noon Evening Bedtime  
 albuterol 90 mcg/actuation inhaler Commonly known as:  PROVENTIL HFA, VENTOLIN HFA, PROAIR HFA Your last dose was: Your next dose is:    
   
   
 Dose:  1 Puff Take 1 Puff by inhalation every four (4) hours as needed for Wheezing. Quantity:  1 Inhaler Refills:  5 DEXILANT 60 mg Cpdb Generic drug:  Dexlansoprazole Your last dose was: Your next dose is: Take  by mouth Daily (before breakfast). Refills:  0  
     
   
   
   
  
 furosemide 20 mg tablet Commonly known as:  LASIX Your last dose was: Your next dose is:    
   
   
 Dose:  20 mg Take 1 Tab by mouth daily as needed. Quantity:  30 Tab Refills:  2 GAVISCON EXTRA STRENGTH 160-105 mg [facility] Generic drug:  aluminum hydrox-magnesium carb Your last dose was: Your next dose is:    
   
   
 Dose:  2 Tab Take 2 Tabs by mouth as needed. Refills:  0  
     
   
   
   
  
 guaiFENesin-codeine 100-10 mg/5 mL solution Commonly known as:  ROBITUSSIN AC Your last dose was: Your next dose is:    
   
   
 Dose:  5 mL Take 5 mL by mouth three (3) times daily as needed for Cough. Max Daily Amount: 15 mL. Do not drive if taking this medication Quantity:  118 mL Refills:  0  
     
   
   
   
  
 labetalol 200 mg tablet Commonly known as:  [place] Eugenie Your last dose was: Your next dose is: TAKE ONE TABLET BY MOUTH 2 TIMES A DAY Quantity:  60 Tab Refills:  03 LORazepam 0.5 mg tablet Commonly known as:  ATIVAN Your last dose was: Your next dose is:    
   
   
 Dose:  0.5 mg Take 1 Tab by mouth two (2) times a day. Max Daily Amount: 1 mg. Quantity:  60 Tab Refills:  3  
     
   
   
   
  
 losartan 100 mg tablet Commonly known as:  COZAAR Your last dose was: Your next dose is:    
   
   
 Dose:  100 mg Take 1 Tab by mouth daily. Quantity:  30 Tab Refills:  5 OMEGA 3 PO Your last dose was: Your next dose is: Take  by mouth. Refills:  0  
     
   
   
   
  
 RESTASIS 0.05 % ophthalmic emulsion Generic drug:  cycloSPORINE  
   
 Your last dose was: Your next dose is:    
   
   
 Dose:  1 Drop Administer 1 Drop to both eyes two (2) times a day. Refills:  0  
     
   
   
   
  
 TYLENOL EXTRA STRENGTH 500 mg tablet Generic drug:  acetaminophen Your last dose was: Your next dose is:    
   
   
 Dose:  1000 mg Take 1,000 mg by mouth every six (6) hours as needed. Refills:  0 Discharge Instructions DISCHARGE SUMMARY from Nurse POST-PROCEDURE NOTE: 
 
Todays diagnostic procedure was tolerated well. A copy of the study results will be sent to your Ordering Physician. Medications: *Please give a list of your current medications to your Primary Care Provider. *Please update this list whenever your medications are discontinued, doses are 
changed, or new medications (including over-the-counter products) are added. *Please carry medication information at all times in case of emergency situations. These are general instructions for a healthy lifestyle: No smoking/ No tobacco products/ Avoid exposure to second hand smoke. ? Surgeon General's Warning:  Quitting smoking now greatly reduces serious risk to your health. Obesity, smoking, and a sedentary lifestyle greatly increase your risk for illness. ? A healthy diet, regular physical exercise & weight monitoring are important for maintaining a healthy lifestyle ? You may be retaining fluid if you have a history of heart failure or if you experience any of the following symptoms:  Weight gain of 3 pounds or more overnight or 5 pounds in a week, increased swelling in our hands or feet or shortness of breath while lying flat in bed. Please call your doctor as soon as you notice any of these symptoms; do not wait until your next office visit. Recognize signs and symptoms of STROKE: 
F  -  Face looks uneven A  -  Arms unable to move or move unevenly S  -  Speech slurred or non-existent T  -  Time to call 911 - as soon as signs and symptoms begin - DO NOT go back         to bed or wait to see If you get better - TIME IS BRAIN. Colorectal Screening ? Colorectal cancer almost always develops from precancerous polyps (abnormal growths) in the colon or rectum. Screening tests can find precancerous polyps, so that they can be removed before they turn into cancer. Screening tests can also find colorectal cancer early, when treatment works best. 
? Speak with your physician about when you should begin screening and how often you should be tested. Wild Brain[employer] Activation Thank you for requesting access to Iceotope. Please follow the instructions below to securely access and download your online medical record. Iceotope allows you to send messages to your doctor, view your test results, renew your prescriptions, schedule appointments, and more. How Do I Sign Up? 1. In your internet browser, go to [email or web address]. 2. Click on the First Time User? Click Here link in the Sign In box. You will see the New Member Sign Up page. 3. Enter your Iceotope Access Code exactly as it appears below. You will not need to use this code after youve completed the sign-up process. If you do not sign up before the expiration date, you must request a new code. Iceotope Access Code: Activation code not generated Current Iceotope Status: Active (This is the date your Iceotope access code will ) 4. Enter the last four digits of your Social Security Number (xxxx) and Date of Birth (mm/dd/yyyy) as indicated and click Submit. You will be taken to the next sign-up page. 5. Create a [employer] Inct ID. This will be your Iceotope login ID and cannot be changed, so think of one that is secure and easy to remember. 6. Create a Iceotope password. You can change your password at any time. 7. Enter your Password Reset Question and Answer.  This can be used at a later time if you forget your password. 8. Enter your e-mail address. You will receive e-mail notification when new information is available in [address] 9. Click Sign Up. You can now view and download portions of your medical record. 10. Click the Download Summary menu link to download a portable copy of your medical information. Additional Information If you have questions, please call [phone]. Remember, MyChart is NOT to be used for urgent needs. For medical emergencies, dial 911. Educational references and/or instructions provided during this visit included: 
 
Esophageal dilation Discharge information has been reviewed with the patient. The patient verbalized understanding. Esophageal Dilation: What to Expect at [facility] Your Recovery After you have esophageal dilation, you will stay at the hospital or surgery center for 1 to 2 hours. This will allow the medicine to wear off. You will be able to go home after your doctor or nurse checks to make sure you are not having any problems. This care sheet gives you a general idea about how long it will take for you to recover. But each person recovers at a different pace. Follow the steps below to get better as quickly as possible. How can you care for yourself at home? Activity · Rest as much as you need to after you go home. · You should be able to go back to your usual activities the day after the procedure. Diet · Follow your doctor's directions for eating after the procedure. · Drink plenty of fluids (unless your doctor has told you not to). Medicines · Your doctor will tell you if and when you can restart your medicines. He or she will also give you instructions about taking any new medicines. · If you take blood thinners, such as warfarin (Coumadin), clopidogrel (Plavix), or aspirin, be sure to talk to your doctor. He or she will tell you if and when to start taking those medicines again.  Make sure that you understand exactly what your doctor wants you to do. · If you have a sore throat the day after the procedure, use an over-the-counter spray to numb your throat. Sucking on throat lozenges and gargling with warm salt water may also help relieve your symptoms. Follow-up care is a key part of your treatment and safety. Be sure to make and go to all appointments, and call your doctor if you are having problems. It's also a good idea to know your test results and keep a list of the medicines you take. When should you call for help? Call 911 anytime you think you may need emergency care. For example, call if: 
· You passed out (lost consciousness). · You have sudden chest pain and shortness of breath. · You cough up blood. · You vomit blood or what looks like coffee grounds. · You pass maroon or very bloody stools. Call your doctor now or seek immediate medical care if: 
· You have trouble swallowing. · You have belly pain. · Your stools are black and tarlike or have streaks of blood. · You are sick to your stomach or cannot keep fluids down. · You have signs of infection, such as: 
¨ Increased pain, swelling, warmth, or redness around your throat, neck, or belly. ¨ A fever. Watch closely for changes in your health, and be sure to contact your doctor if: 
· Your throat still hurts after a day or two. · You do not get better as expected. Where can you learn more? Go to http://christina-jose de jesus.info/. Enter N777 in the search box to learn more about \"Esophageal Dilation: What to Expect at Home. \" Current as of: August 9, 2016 Content Version: 11.3 © 1818-5818 [employer]. Care instructions adapted under license by [employer] (which disclaims liability or warranty for this information).  If you have questions about a medical condition or this instruction, always ask your healthcare professional. [employer] disclaims any warranty or liability for your use of this information. Upper GI Endoscopy: What to Expect at [facility] Your Recovery After you have an endoscopy, you will stay at the hospital or clinic for 1 to 2 hours. This will allow the medicine to wear off. You will be able to go home after your doctor or nurse checks to make sure you are not having any problems. You may have to stay overnight if you had treatment during the test. You may have a sore throat for a day or two after the test. 
This care sheet gives you a general idea about what to expect after the test. 
How can you care for yourself at home? Activity · Rest as much as you need to after you go home. · You should be able to go back to your usual activities the day after the test. 
Diet · Follow your doctor's directions for eating after the test. 
· Drink plenty of fluids (unless your doctor has told you not to). Medications · If you have a sore throat the day after the test, use an over-the-counter spray to numb your throat. Follow-up care is a key part of your treatment and safety. Be sure to make and go to all appointments, and call your doctor if you are having problems. It's also a good idea to know your test results and keep a list of the medicines you take. When should you call for help? Call 911 anytime you think you may need emergency care. For example, call if: 
· You passed out (lost consciousness). · You cough up blood. · You vomit blood or what looks like coffee grounds. · You pass maroon or very bloody stools. Call your doctor now or seek immediate medical care if: 
· You have trouble swallowing. · You have belly pain. · Your stools are black and tarlike or have streaks of blood. · You are sick to your stomach or cannot keep fluids down. Watch closely for changes in your health, and be sure to contact your doctor if: 
· Your throat still hurts after a day or two. · You do not get better as expected. Where can you learn more? Go to http://christina-jose de jesus.info/. Enter (48) 962-028 in the search box to learn more about \"Upper GI Endoscopy: What to Expect at Home. \" Current as of: August 9, 2016 Content Version: 11.3 © 7451-6454 [employer]. Care instructions adapted under license by [employer] (which disclaims liability or warranty for this information). If you have questions about a medical condition or this instruction, always ask your healthcare professional. [address] any warranty or liability for your use of this information. Discharge Orders None Introducing [facility]! Dear [facility]: Thank you for requesting a Salt Rights account. Our records indicate that you already have an active Salt Rights account. You can access your account anytime at [email or web address] Did you know that you can access your hospital and ER discharge instructions at any time in Salt Rights? You can also review all of your test results from your hospital stay or ER visit. Additional Information If you have questions, please visit the Frequently Asked Questions section of the Salt Rights website at [email or web address] Remember, Salt Rights is NOT to be used for urgent needs. For medical emergencies, dial 911. Now available from your iPhone and Android! General Information Please provide this summary of care documentation to your next provider. Patient Signature:  ____________________________________________________________ Date:  ____________________________________________________________  
  
Janyth Mins Provider Signature:  ____________________________________________________________ Date:  ____________________________________________________________

## 2017-10-23 NOTE — ANESTHESIA PREPROCEDURE EVALUATION
Anesthetic History   No history of anesthetic complications            Review of Systems / Medical History  Patient summary reviewed and pertinent labs reviewed    Pulmonary  Within defined limits                 Neuro/Psych         Psychiatric history     Cardiovascular    Hypertension: well controlled              Exercise tolerance: >4 METS     GI/Hepatic/Renal     GERD: poorly controlled    Renal disease: stones  Liver disease     Endo/Other        Morbid obesity     Other Findings   Comments:   Risk Factors for Postoperative nausea/vomiting:       History of postoperative nausea/vomiting? NO       Female? YES       Motion sickness? NO       Intended opioid administration for postoperative analgesia? NO      Smoking Abstinence  Current Smoker? NO  Elective Surgery? YES  Seen preoperatively by anesthesiologist or proxy prior to day of surgery? YES  Pt abstained from smoking 24 hours prior to anesthesia?  N/A           Physical Exam    Airway  Mallampati: III  TM Distance: 4 - 6 cm  Neck ROM: normal range of motion   Mouth opening: Normal     Cardiovascular    Rhythm: regular  Rate: normal         Dental    Dentition: Bridges     Pulmonary  Breath sounds clear to auscultation               Abdominal  GI exam deferred       Other Findings            Anesthetic Plan    ASA: 2  Anesthesia type: MAC            Anesthetic plan and risks discussed with: Patient

## 2017-10-23 NOTE — ANESTHESIA POSTPROCEDURE EVALUATION
Post-Anesthesia Evaluation and Assessment    Patient: Gino Cardoza MRN: [id number]  SSN: [id number]    YOB: 1950  Age: 78 y.o. Sex: female       Cardiovascular Function/Vital Signs  Visit Vitals    BP (!) 149/113    Pulse 93    Temp 36.9 °C (98.4 °F)    Resp 19    Ht 5' 1\" (1.549 m)    Wt 79.4 kg (175 lb)    SpO2 100%    BMI 33.07 kg/m2       Patient is status post MAC anesthesia for Procedure(s):  UPPER ENDOSCOPY / dilation. Nausea/Vomiting: None    Postoperative hydration reviewed and adequate. Pain:  Pain Scale 1: Numeric (0 - 10) (10/23/17 1405)  Pain Intensity 1: 0 (10/23/17 1405)   Managed    Neurological Status: At baseline    Mental Status and Level of Consciousness: Arousable    Pulmonary Status:   O2 Device: Nasal cannula (10/23/17 5815)   Adequate oxygenation and airway patent    Complications related to anesthesia: None    Post-anesthesia assessment completed.  No concerns    Signed By: Juwan Saini MD     October 23, 2017

## 2017-10-23 NOTE — PROCEDURES
Jina  [facility], [address]      Brief Procedure Note    Amol Chatman  1950  [id number]    Date of Procedure: 10/23/2017    Preoperative diagnosis: Dysphagia, other    Postoperative diagnosis:  esophageal stricture, dilated with 54Fr, 56Fr Brown, hiatal hernia.     Type of Anesthesia: MAC (monitered anesthesia care)    Description of Findings: same as post op dx    Procedure: Procedure(s):  UPPER ENDOSCOPY / dilation    :  Dr. Sejal Chavez MD    Assistant(s): @[facility]@    Type of Anesthesia:MAC     EBL:None    Specimens: * No specimens in log *    Findings: See printed and scanned procedure note    Complications: None    Dr. Sejal Chavez MD  10/23/2017  2:36 PM

## 2017-10-23 NOTE — DISCHARGE INSTRUCTIONS
DISCHARGE SUMMARY from Nurse     POST-PROCEDURE NOTE:    Chet Field diagnostic procedure was tolerated well. A copy of the study results will be sent to your Ordering Physician. Medications:    *Please give a list of your current medications to your Primary Care Provider. *Please update this list whenever your medications are discontinued, doses are  changed, or new medications (including over-the-counter products) are added. *Please carry medication information at all times in case of emergency situations. These are general instructions for a healthy lifestyle:    No smoking/ No tobacco products/ Avoid exposure to second hand smoke.  Surgeon General's Warning:  Quitting smoking now greatly reduces serious risk to your health. Obesity, smoking, and a sedentary lifestyle greatly increase your risk for illness.  A healthy diet, regular physical exercise & weight monitoring are important for maintaining a healthy lifestyle   You may be retaining fluid if you have a history of heart failure or if you experience any of the following symptoms:  Weight gain of 3 pounds or more overnight or 5 pounds in a week, increased swelling in our hands or feet or shortness of breath while lying flat in bed. Please call your doctor as soon as you notice any of these symptoms; do not wait until your next office visit. Recognize signs and symptoms of STROKE:  F  -  Face looks uneven  A  -  Arms unable to move or move unevenly  S  -  Speech slurred or non-existent  T  -  Time to call 911 - as soon as signs and symptoms begin - DO NOT go back         to bed or wait to see If you get better - TIME IS BRAIN. Colorectal Screening   Colorectal cancer almost always develops from precancerous polyps (abnormal growths) in the colon or rectum. Screening tests can find precancerous polyps, so that they can be removed before they turn into cancer.  Screening tests can also find colorectal cancer early, when treatment works best.  Nitin Waggoenr Speak with your physician about when you should begin screening and how often you should be tested. [employer] Activation    Thank you for requesting access to [employer]. Please follow the instructions below to securely access and download your online medical record. [employer] allows you to send messages to your doctor, view your test results, renew your prescriptions, schedule appointments, and more. How Do I Sign Up? 1. In your internet browser, go to [email or web address]. 2. Click on the First Time User? Click Here link in the Sign In box. You will see the New Member Sign Up page. 3. Enter your [employer] Access Code exactly as it appears below. You will not need to use this code after youve completed the sign-up process. If you do not sign up before the expiration date, you must request a new code. [employer] Access Code: Activation code not generated  Current [employer] Status: Active (This is the date your [employer] access code will )    4. Enter the last four digits of your Social Security Number (xxxx) and Date of Birth (mm/dd/yyyy) as indicated and click Submit. You will be taken to the next sign-up page. 5. Create a [employer] ID. This will be your [employer] login ID and cannot be changed, so think of one that is secure and easy to remember. 6. Create a [employer] password. You can change your password at any time. 7. Enter your Password Reset Question and Answer. This can be used at a later time if you forget your password. 8. Enter your e-mail address. You will receive e-mail notification when new information is available in [address]. 9. Click Sign Up. You can now view and download portions of your medical record. 10. Click the Download Summary menu link to download a portable copy of your medical information. Additional Information    If you have questions, please call [phone]. Remember, [employer] is NOT to be used for urgent needs.  For medical emergencies, dial 911. Educational references and/or instructions provided during this visit included:    Esophageal dilation         Discharge information has been reviewed with the patient. The patient verbalized understanding. Esophageal Dilation: What to Expect at [facility]  After you have esophageal dilation, you will stay at the hospital or surgery center for 1 to 2 hours. This will allow the medicine to wear off. You will be able to go home after your doctor or nurse checks to make sure you are not having any problems. This care sheet gives you a general idea about how long it will take for you to recover. But each person recovers at a different pace. Follow the steps below to get better as quickly as possible. How can you care for yourself at home? Activity  · Rest as much as you need to after you go home. · You should be able to go back to your usual activities the day after the procedure. Diet  · Follow your doctor's directions for eating after the procedure. · Drink plenty of fluids (unless your doctor has told you not to). Medicines  · Your doctor will tell you if and when you can restart your medicines. He or she will also give you instructions about taking any new medicines. · If you take blood thinners, such as warfarin (Coumadin), clopidogrel (Plavix), or aspirin, be sure to talk to your doctor. He or she will tell you if and when to start taking those medicines again. Make sure that you understand exactly what your doctor wants you to do. · If you have a sore throat the day after the procedure, use an over-the-counter spray to numb your throat. Sucking on throat lozenges and gargling with warm salt water may also help relieve your symptoms. Follow-up care is a key part of your treatment and safety. Be sure to make and go to all appointments, and call your doctor if you are having problems.  It's also a good idea to know your test results and keep a list of the medicines you take. When should you call for help? Call 911 anytime you think you may need emergency care. For example, call if:  · You passed out (lost consciousness). · You have sudden chest pain and shortness of breath. · You cough up blood. · You vomit blood or what looks like coffee grounds. · You pass maroon or very bloody stools. Call your doctor now or seek immediate medical care if:  · You have trouble swallowing. · You have belly pain. · Your stools are black and tarlike or have streaks of blood. · You are sick to your stomach or cannot keep fluids down. · You have signs of infection, such as:  ¨ Increased pain, swelling, warmth, or redness around your throat, neck, or belly. ¨ A fever. Watch closely for changes in your health, and be sure to contact your doctor if:  · Your throat still hurts after a day or two. · You do not get better as expected. Where can you learn more? Go to http://christina-jose de jesus.info/. Enter T427 in the search box to learn more about \"Esophageal Dilation: What to Expect at Home. \"  Current as of: August 9, 2016  Content Version: 11.3  © 8711-0528 [employer]. Care instructions adapted under license by [employer] (which disclaims liability or warranty for this information). If you have questions about a medical condition or this instruction, always ask your healthcare professional. [place] [address] any warranty or liability for your use of this information. Upper GI Endoscopy: What to Expect at [address]  After you have an endoscopy, you will stay at the hospital or clinic for 1 to 2 hours. This will allow the medicine to wear off. You will be able to go home after your doctor or nurse checks to make sure you are not having any problems.   You may have to stay overnight if you had treatment during the test. You may have a sore throat for a day or two after the test.  This care sheet gives you a general idea about what to expect after the test.  How can you care for yourself at home? Activity  · Rest as much as you need to after you go home. · You should be able to go back to your usual activities the day after the test.  Diet  · Follow your doctor's directions for eating after the test.  · Drink plenty of fluids (unless your doctor has told you not to). Medications  · If you have a sore throat the day after the test, use an over-the-counter spray to numb your throat. Follow-up care is a key part of your treatment and safety. Be sure to make and go to all appointments, and call your doctor if you are having problems. It's also a good idea to know your test results and keep a list of the medicines you take. When should you call for help? Call 911 anytime you think you may need emergency care. For example, call if:  · You passed out (lost consciousness). · You cough up blood. · You vomit blood or what looks like coffee grounds. · You pass maroon or very bloody stools. Call your doctor now or seek immediate medical care if:  · You have trouble swallowing. · You have belly pain. · Your stools are black and tarlike or have streaks of blood. · You are sick to your stomach or cannot keep fluids down. Watch closely for changes in your health, and be sure to contact your doctor if:  · Your throat still hurts after a day or two. · You do not get better as expected. Where can you learn more? Go to http://christina-jose de jesus.info/. Enter ([phone] 550-185 in the search box to learn more about \"Upper GI Endoscopy: What to Expect at Home. \"  Current as of: August 9, 2016  Content Version: 11.3  © 8122-8121 [employer]. Care instructions adapted under license by [employer] (which disclaims liability or warranty for this information).  If you have questions about a medical condition or this instruction, always ask your healthcare professional. Renny Bonilla disclaims any warranty or liability for your use of this information.

## 2017-10-24 ENCOUNTER — HOSPITAL ENCOUNTER (OUTPATIENT)
Dept: ULTRASOUND IMAGING | Age: 67
Discharge: HOME | End: 2017-10-24
Attending: INTERNAL MEDICINE
Payer: COMMERCIAL

## 2017-10-24 ENCOUNTER — HOSPITAL ENCOUNTER (OUTPATIENT)
Dept: MAMMOGRAPHY | Age: 67
Discharge: HOME | End: 2017-10-24
Attending: INTERNAL MEDICINE
Payer: COMMERCIAL

## 2017-10-24 DIAGNOSIS — R92.8 ABNORMAL MAMMOGRAM OF RIGHT BREAST: ICD-10-CM

## 2017-10-24 PROCEDURE — 76642 ULTRASOUND BREAST LIMITED: CPT

## 2017-10-24 PROCEDURE — 77065 DX MAMMO INCL CAD UNI: CPT

## 2017-10-26 ENCOUNTER — HOSPITAL ENCOUNTER (OUTPATIENT)
Dept: MAMMOGRAPHY | Age: 67
Discharge: HOME | End: 2017-10-26
Attending: INTERNAL MEDICINE
Payer: COMMERCIAL

## 2017-10-26 DIAGNOSIS — R92.8 ABNORMAL MAMMOGRAM: ICD-10-CM

## 2017-10-26 DIAGNOSIS — N63.0 LUMP OR MASS IN BREAST: ICD-10-CM

## 2017-10-26 PROCEDURE — 19081 BX BREAST 1ST LESION STRTCTC: CPT

## 2017-10-26 PROCEDURE — 88374 M/PHMTRC ALYS ISHQUANT/SEMIQ: CPT | Performed by: RADIOLOGY

## 2017-10-26 PROCEDURE — 74011000250 HC RX REV CODE- 250: Performed by: INTERNAL MEDICINE

## 2017-10-26 PROCEDURE — 88305 TISSUE EXAM BY PATHOLOGIST: CPT | Performed by: RADIOLOGY

## 2017-10-26 PROCEDURE — 88360 TUMOR IMMUNOHISTOCHEM/MANUAL: CPT | Performed by: RADIOLOGY

## 2017-10-26 PROCEDURE — 77065 DX MAMMO INCL CAD UNI: CPT

## 2017-10-26 RX ORDER — LIDOCAINE HYDROCHLORIDE AND EPINEPHRINE 10; 10 MG/ML; UG/ML
1.5 INJECTION, SOLUTION INFILTRATION; PERINEURAL
Status: COMPLETED | OUTPATIENT
Start: 2017-10-26 | End: 2017-10-26

## 2017-10-26 RX ORDER — LIDOCAINE HYDROCHLORIDE 10 MG/ML
5 INJECTION, SOLUTION EPIDURAL; INFILTRATION; INTRACAUDAL; PERINEURAL
Status: COMPLETED | OUTPATIENT
Start: 2017-10-26 | End: 2017-10-26

## 2017-10-26 RX ADMIN — LIDOCAINE HYDROCHLORIDE 3 ML: 10 INJECTION, SOLUTION EPIDURAL; INFILTRATION; INTRACAUDAL; PERINEURAL at 14:10

## 2017-10-26 RX ADMIN — LIDOCAINE HYDROCHLORIDE AND EPINEPHRINE 20 MG: 10; 10 INJECTION, SOLUTION INFILTRATION; PERINEURAL at 14:15

## 2017-10-30 ENCOUNTER — OFFICE VISIT (OUTPATIENT)
Dept: INTERNAL MEDICINE CLINIC | Age: 67
End: 2017-10-30

## 2017-10-30 VITALS — RESPIRATION RATE: 16 BRPM | BODY MASS INDEX: 33.04 KG/M2 | WEIGHT: 175 LBS | HEIGHT: 61 IN

## 2017-10-30 NOTE — PROGRESS NOTES
1. Have you been to the ER, urgent care clinic since your last visit? Hospitalized since your last visit? No    2. Have you seen or consulted any other health care providers outside of the [address] [place] [address] since your last visit? Include any pap smears or colon screening.  No

## 2017-10-31 DIAGNOSIS — C50.119 MALIGNANT NEOPLASM OF CENTRAL PORTION OF FEMALE BREAST, UNSPECIFIED ESTROGEN RECEPTOR STATUS, UNSPECIFIED LATERALITY (HCC): Primary | ICD-10-CM

## 2017-11-03 ENCOUNTER — OFFICE VISIT (OUTPATIENT)
Dept: SURGERY | Age: 67
End: 2017-11-03

## 2017-11-03 VITALS — DIASTOLIC BLOOD PRESSURE: 80 MMHG | SYSTOLIC BLOOD PRESSURE: 110 MMHG | RESPIRATION RATE: 16 BRPM

## 2017-11-03 DIAGNOSIS — C50.412 MALIGNANT NEOPLASM OF UPPER-OUTER QUADRANT OF LEFT BREAST IN FEMALE, ESTROGEN RECEPTOR POSITIVE (HCC): ICD-10-CM

## 2017-11-03 DIAGNOSIS — Z17.0 MALIGNANT NEOPLASM OF UPPER-OUTER QUADRANT OF LEFT BREAST IN FEMALE, ESTROGEN RECEPTOR POSITIVE (HCC): ICD-10-CM

## 2017-11-03 DIAGNOSIS — N63.0 LUMP OR MASS IN BREAST: Primary | ICD-10-CM

## 2017-11-03 NOTE — PROGRESS NOTES
Progress Note    Patient: Gino Cardoza  MRN: [id number]  SSN: [id number]   YOB: 1950  Age: 78 y.o. Sex: female     No chief complaint on file. HPI    Ms. Fernanda Lerner is a 60-year-old woman with newly diagnosed Right breast cancer. This was picked up on screening mammogram, and is small. History of breast cancer in her family her menarche was 7 and her first child at 33. She has no symptoms from this and is as it was found on screening mammogram.  Never had a breast biopsy or other risk factors. We discussed at length the pathophysiology of breast cancer as well as its treatment including surgery, radiation, chemotherapy, and hormone therapy. Her pathology shows a infiltrating intraductal carcinoma which is ER positive TX negative tumor. The HER-2 evaluation is pending. We have discussed what all this means. Her tumor on mammogram is small 8 mm or so.   I reviewed the images of both her ultrasound and her mammogram.    Past Medical History:   Diagnosis Date    Acute reaction to stress     Closed fracture of left wrist     Diastolic dysfunction     Fatty liver     GERD (gastroesophageal reflux disease)     Hyperlipidemia     Hypertension     diastolic dysfunction    Hypoglycemia     Kidney stones     11/16 for yrs; spon passage always so far    RSD (reflex sympathetic dystrophy) 8/2009    no blood pressure or sticks in left arm    Syncope 2000    no recurrence since; was orthostatic at that time    Unspecified adverse effect of anesthesia     hard to sedate     Past Surgical History:   Procedure Laterality Date    HX CARPAL TUNNEL RELEASE      HX GYN      lap. fibroid removal    HX HEART CATHETERIZATION  2005 approx    normal coronaries per pt    HX ORTHOPAEDIC      ORIF left wrist    HX SHOULDER ARTHROSCOPY Left 1988     Allergies   Allergen Reactions    Lipitor [Atorvastatin] Myalgia     Quit 2/17    Nsaids (Non-Steroidal Anti-Inflammatory Drug) Other (comments)     Severe abdominal pain    Pcn [Penicillins] Swelling     Swelling, hives & photosensitive rxn     Current Outpatient Prescriptions   Medication Sig Dispense Refill    losartan (COZAAR) 100 mg tablet Take 1 Tab by mouth daily. 30 Tab 5    labetalol (NORMODYNE) 200 mg tablet TAKE ONE TABLET BY MOUTH 2 TIMES A DAY 60 Tab 03    Dexlansoprazole (DEXILANT) 60 mg CpDB Take  by mouth Daily (before breakfast).  furosemide (LASIX) 20 mg tablet Take 1 Tab by mouth daily as needed. 30 Tab 2    albuterol (PROVENTIL HFA, VENTOLIN HFA, PROAIR HFA) 90 mcg/actuation inhaler Take 1 Puff by inhalation every four (4) hours as needed for Wheezing. 1 Inhaler 5    FLAXSEED OIL (OMEGA 3 PO) Take  by mouth.  cycloSPORINE (RESTASIS) 0.05 % ophthalmic emulsion Administer 1 Drop to both eyes two (2) times a day.  LORazepam (ATIVAN) 0.5 mg tablet Take 1 Tab by mouth two (2) times a day. Max Daily Amount: 1 mg. (Patient taking differently: Take 0.5 mg by mouth as needed.) 60 Tab 3    aluminum hydrox-magnesium carb (GAVISCON EXTRA STRENGTH) 160-105 mg chew Take 2 Tabs by mouth as needed.  acetaminophen (TYLENOL EXTRA STRENGTH) 500 mg tablet Take 1,000 mg by mouth every six (6) hours as needed. Social History     Social History    Marital status:      Spouse name: N/A    Number of children: N/A    Years of education: N/A     Occupational History    Not on file.      Social History Main Topics    Smoking status: Former Smoker     Packs/day: 0.50     Years: 11.00     Quit date: 1/1/2003    Smokeless tobacco: Never Used    Alcohol use 0.6 oz/week     1 Glasses of wine per week      Comment: rarely- 3-4 drinks/yr    Drug use: No    Sexual activity: Not Currently     Other Topics Concern    Not on file     Social History Narrative     Family History   Problem Relation Age of Onset    Diabetes Maternal Aunt     Cancer Maternal Uncle      throat    Cancer Maternal Grandmother      melanoma    Heart Disease Other     Heart Attack Paternal Grandmother 48    Cancer Maternal Grandfather      Melanoma    Stroke Neg Hx          Review of systems:  Patient denies any reflux, emesis, abdominal pain, change in bowel habits, hematochezia, melena, fever, weight loss, fatigue chills, dermatitis, abnormal moles, change in vision, vertigo, epistaxis, dysphagia, hoarseness, chest pain, palpitations, hypertension, edema, cough, shortness of breath, wheezing, hemoptysis, snoring, hematuria, diabetes, thyroid disease, anemia, bruising, history of blood transfusion, dizziness, headache, or fainting.     Physical Examination    Well developed well nourished female in no apparent distress  Visit Vitals    /80    Resp 16      Head: normocephalic, atraumatic  Mouth: Clear, no overt lesions, oral mucosa pink and moist  Neck: supple, no masses, no adenopathy or carotid bruits, trachea midline  Resp: clear to auscultation bilaterally, no wheeze, rhonchi or rales, excursions normal and symmetrical  Cardio: Regular rate and rhythm, no murmurs, clicks, gallops or rubs, no edema or varicosities  Abdomen: soft, nontender, nondistended, normoactive bowel sounds, no hernias, no hepatosplenomegaly,   Back: Deferred  Extremeties: warm, well-perfused, no tenderness or swelling, normal gait/station  Neuro: sensation and strength grossly intact and symmetrical  Psych: alert and oriented to person, place and time  Breast exam bilateral breast exam without dominant mass, skin change, nipple discharge, or lymphadenopathy    IMPRESSION  Likely an early stage left breast cancer    PLAN  Orders Placed This Encounter    [facility]ER NDL/WIRE/CLIP/SEED BREAST RT     Standing Status:   Future     Standing Expiration Date:   12/3/2018     Order Specific Question:   Reason for Exam     Answer:   R breast cancer    NM LYMPHOSCINTIG RT BREAST     Standing Status:   Future     Standing Expiration Date:   12/3/2018     Order Specific Question:   Reason for Exam Answer:   R breast cancer     Lumpectomy and sentinel lymph node biopsy followed by radiation  Alessandra Wright MD

## 2017-11-27 ENCOUNTER — HOSPITAL ENCOUNTER (OUTPATIENT)
Dept: PREADMISSION TESTING | Age: 67
Discharge: HOME | End: 2017-11-27
Payer: COMMERCIAL

## 2017-11-27 ENCOUNTER — HOSPITAL ENCOUNTER (OUTPATIENT)
Dept: LAB | Age: 67
Discharge: HOME | End: 2017-11-27

## 2017-11-27 DIAGNOSIS — Z17.0 MALIGNANT NEOPLASM OF UPPER-OUTER QUADRANT OF LEFT BREAST IN FEMALE, ESTROGEN RECEPTOR POSITIVE (HCC): ICD-10-CM

## 2017-11-27 DIAGNOSIS — C50.412 MALIGNANT NEOPLASM OF UPPER-OUTER QUADRANT OF LEFT BREAST IN FEMALE, ESTROGEN RECEPTOR POSITIVE (HCC): ICD-10-CM

## 2017-11-27 LAB
ABSOLUTE LYMPHOCYTE COUNT, 10803: 1.7 K/UL (ref 1–4.8)
ANION GAP SERPL CALC-SCNC: 13 MMOL/L
ATRIAL RATE: 80 BPM
BASOPHILS # BLD: 0 K/UL (ref 0–0.2)
BASOPHILS NFR BLD: 0 % (ref 0–2)
BUN SERPL-MCNC: 10 MG/DL (ref 6–22)
CALCIUM SERPL-MCNC: 9.3 MG/DL (ref 8.4–10.5)
CALCULATED P AXIS, ECG09: 38 DEGREES
CALCULATED R AXIS, ECG10: 48 DEGREES
CALCULATED T AXIS, ECG11: 34 DEGREES
CHLORIDE SERPL-SCNC: 103 MMOL/L (ref 98–110)
CO2 SERPL-SCNC: 28 MMOL/L (ref 20–32)
CREAT SERPL-MCNC: 0.8 MG/DL (ref 0.8–1.4)
DIAGNOSIS, 93000: NORMAL
EOSINOPHIL # BLD: 0.1 K/UL (ref 0–0.5)
EOSINOPHIL NFR BLD: 1 % (ref 0–6)
ERYTHROCYTE [DISTWIDTH] IN BLOOD BY AUTOMATED COUNT: 13.5 % (ref 10–16)
GFRAA, 66117: >60
GFRNA, 66118: >60
GLUCOSE SERPL-MCNC: 82 MG/DL (ref 70–99)
GRANULOCYTES,GRANS: 62 % (ref 40–75)
HCT VFR BLD AUTO: 38.4 % (ref 35.1–48.3)
HGB BLD-MCNC: 12.6 G/DL (ref 11.7–16.1)
LYMPHOCYTES, LYMLT: 26 % (ref 27–45)
MCH RBC QN AUTO: 31 PG (ref 26–34)
MCHC RBC AUTO-ENTMCNC: 33 G/DL (ref 32–36)
MCV RBC AUTO: 93 FL (ref 80–95)
MONOCYTES # BLD: 0.7 K/UL (ref 0.1–0.9)
MONOCYTES NFR BLD: 10 % (ref 3–9)
NEUTROPHILS # BLD AUTO: 4 K/UL (ref 1.8–7.7)
P-R INTERVAL, ECG05: 172 MS
PLATELET # BLD AUTO: 266 K/UL (ref 140–440)
PMV BLD AUTO: 10.3 FL (ref 6–10.8)
POTASSIUM SERPL-SCNC: 4.8 MMOL/L (ref 3.5–5.5)
Q-T INTERVAL, ECG07: 378 MS
QRS DURATION, ECG06: 82 MS
QTC CALCULATION (BEZET), ECG08: 435 MS
RBC # BLD AUTO: 4.13 M/UL (ref 3.8–5.2)
SENTARA SPECIMEN COL,SENBCF: NORMAL
SODIUM SERPL-SCNC: 144 MMOL/L (ref 133–145)
VENTRICULAR RATE, ECG03: 80 BPM
WBC # BLD AUTO: 6.5 K/UL (ref 4–11)

## 2017-11-27 PROCEDURE — 99001 SPECIMEN HANDLING PT-LAB: CPT

## 2017-11-27 PROCEDURE — 93005 ELECTROCARDIOGRAM TRACING: CPT

## 2017-11-29 ENCOUNTER — HOSPITAL ENCOUNTER (OUTPATIENT)
Dept: VASCULAR SURGERY | Age: 67
Discharge: HOME | End: 2017-11-29
Payer: COMMERCIAL

## 2017-11-29 ENCOUNTER — ANESTHESIA EVENT (OUTPATIENT)
Dept: SURGERY | Age: 67
End: 2017-11-29
Payer: COMMERCIAL

## 2017-11-29 ENCOUNTER — HOSPITAL ENCOUNTER (OUTPATIENT)
Dept: NUCLEAR MEDICINE | Age: 67
Discharge: HOME | End: 2017-11-29
Payer: COMMERCIAL

## 2017-11-29 DIAGNOSIS — M79.661 RIGHT CALF PAIN: ICD-10-CM

## 2017-11-29 DIAGNOSIS — N63.0 LUMP OR MASS IN BREAST: ICD-10-CM

## 2017-11-29 DIAGNOSIS — M79.661 RIGHT CALF PAIN: Primary | ICD-10-CM

## 2017-11-29 PROCEDURE — 78195 LYMPH SYSTEM IMAGING: CPT

## 2017-11-29 PROCEDURE — 74011000250 HC RX REV CODE- 250

## 2017-11-29 PROCEDURE — 93971 EXTREMITY STUDY: CPT

## 2017-11-29 RX ORDER — LIDOCAINE HYDROCHLORIDE 10 MG/ML
INJECTION, SOLUTION EPIDURAL; INFILTRATION; INTRACAUDAL; PERINEURAL
Status: DISPENSED
Start: 2017-11-29 | End: 2017-11-30

## 2017-11-29 RX ORDER — SODIUM BICARBONATE 1 MEQ/ML
SYRINGE (ML) INTRAVENOUS
Status: DISPENSED
Start: 2017-11-29 | End: 2017-11-30

## 2017-11-29 RX ADMIN — LIDOCAINE HYDROCHLORIDE 3 ML: 10 INJECTION, SOLUTION EPIDURAL; INFILTRATION; INTRACAUDAL; PERINEURAL at 13:32

## 2017-11-29 NOTE — PROCEDURES
[facility]  *** FINAL REPORT ***    Name: Rebekah Irby  MRN: [id number]    Outpatient  : 25 Aug 1950  HIS Order #: [id number]  [address] [facility] Visit #: [id number]  Date: 2017    TYPE OF TEST: Peripheral Venous Testing    REASON FOR TEST  Calf pain    Right Leg:-  Deep venous thrombosis:           No  Superficial venous thrombosis:    No  Deep venous insufficiency:        Not examined  Superficial venous insufficiency: Not examined      INTERPRETATION/FINDINGS  Duplex images were obtained using 2-D gray scale, color flow, and  spectral Doppler analysis. Right leg :  1. No evidence of deep venous thrombosis detected in the veins  visualized. 2. Deep vein(s) visualized include the common femoral, femoral,  popliteal, posterior tibial, and peroneal veins. 3. No evidence of superficial thrombosis detected. 4. Superficial vein(s) visualized include the great saphenous vein at  the sapheno-femoral junction. 5. No evidence of deep vein thrombosis in the contralateral common  femoral vein. ADDITIONAL COMMENTS  Note: Mid calf tibioperoneal venous confluence. I have personally reviewed the data relevant to the interpretation of  this  study. TECHNOLOGIST: Ely Real. Puneet WAKEFIELD  Signed: 2017 12:29 PM    PHYSICIAN: Diana Rankin MD  Signed: 2017 01:58 PM

## 2017-11-29 NOTE — PROGRESS NOTES
Right lower extremity venous duplex exam complete; preliminary findings to follow. Armband removed and patient discharged.

## 2017-11-30 ENCOUNTER — HOSPITAL ENCOUNTER (OUTPATIENT)
Age: 67
Setting detail: OUTPATIENT SURGERY
Discharge: HOME | End: 2017-11-30
Payer: COMMERCIAL

## 2017-11-30 ENCOUNTER — APPOINTMENT (OUTPATIENT)
Dept: MAMMOGRAPHY | Age: 67
End: 2017-11-30
Payer: COMMERCIAL

## 2017-11-30 ENCOUNTER — HOSPITAL ENCOUNTER (OUTPATIENT)
Dept: MAMMOGRAPHY | Age: 67
Discharge: HOME | End: 2017-11-30
Payer: COMMERCIAL

## 2017-11-30 ENCOUNTER — ANESTHESIA (OUTPATIENT)
Dept: SURGERY | Age: 67
End: 2017-11-30
Payer: COMMERCIAL

## 2017-11-30 ENCOUNTER — APPOINTMENT (OUTPATIENT)
Dept: VASCULAR SURGERY | Age: 67
End: 2017-11-30
Payer: COMMERCIAL

## 2017-11-30 VITALS
SYSTOLIC BLOOD PRESSURE: 140 MMHG | OXYGEN SATURATION: 97 % | DIASTOLIC BLOOD PRESSURE: 70 MMHG | BODY MASS INDEX: 34.01 KG/M2 | HEIGHT: 61 IN | TEMPERATURE: 98 F | HEART RATE: 84 BPM | RESPIRATION RATE: 14 BRPM | WEIGHT: 180.13 LBS

## 2017-11-30 DIAGNOSIS — Z17.1 MALIGNANT NEOPLASM OF UPPER-OUTER QUADRANT OF LEFT BREAST IN FEMALE, ESTROGEN RECEPTOR NEGATIVE (HCC): Primary | ICD-10-CM

## 2017-11-30 DIAGNOSIS — C50.412 MALIGNANT NEOPLASM OF UPPER-OUTER QUADRANT OF LEFT BREAST IN FEMALE, ESTROGEN RECEPTOR NEGATIVE (HCC): Primary | ICD-10-CM

## 2017-11-30 DIAGNOSIS — N63.0 LUMP OR MASS IN BREAST: ICD-10-CM

## 2017-11-30 PROCEDURE — 77030013079 HC BLNKT BAIR HGGR 3M -A

## 2017-11-30 PROCEDURE — 74011000250 HC RX REV CODE- 250

## 2017-11-30 PROCEDURE — 74011250636 HC RX REV CODE- 250/636: Performed by: NURSE ANESTHETIST, CERTIFIED REGISTERED

## 2017-11-30 PROCEDURE — 74011250637 HC RX REV CODE- 250/637

## 2017-11-30 PROCEDURE — 77030020782 HC GWN BAIR PAWS FLX 3M -B

## 2017-11-30 PROCEDURE — 88342 IMHCHEM/IMCYTCHM 1ST ANTB: CPT

## 2017-11-30 PROCEDURE — 77030008467 HC STPLR SKN COVD -B

## 2017-11-30 PROCEDURE — 77030032490 HC SLV COMPR SCD KNE COVD -B

## 2017-11-30 PROCEDURE — 19281 PERQ DEVICE BREAST 1ST IMAG: CPT

## 2017-11-30 PROCEDURE — 74011250637 HC RX REV CODE- 250/637: Performed by: ANESTHESIOLOGY

## 2017-11-30 PROCEDURE — 88307 TISSUE EXAM BY PATHOLOGIST: CPT

## 2017-11-30 PROCEDURE — 74011250636 HC RX REV CODE- 250/636

## 2017-11-30 PROCEDURE — 77030031139 HC SUT VCRL2 J&J -A

## 2017-11-30 PROCEDURE — 76010000153 HC OR TIME 1.5 TO 2 HR

## 2017-11-30 PROCEDURE — 74011000258 HC RX REV CODE- 258

## 2017-11-30 PROCEDURE — 77030011640 HC PAD GRND REM COVD -A

## 2017-11-30 PROCEDURE — 76060000034 HC ANESTHESIA 1.5 TO 2 HR

## 2017-11-30 PROCEDURE — 76210000026 HC REC RM PH II 1 TO 1.5 HR

## 2017-11-30 PROCEDURE — 77030002933 HC SUT MCRYL J&J -A

## 2017-11-30 PROCEDURE — 76210000016 HC OR PH I REC 1 TO 1.5 HR

## 2017-11-30 PROCEDURE — 77030003028 HC SUT VCRL J&J -A

## 2017-11-30 PROCEDURE — 74011250636 HC RX REV CODE- 250/636: Performed by: ANESTHESIOLOGY

## 2017-11-30 PROCEDURE — 77030002996 HC SUT SLK J&J -A

## 2017-11-30 RX ORDER — HYDROMORPHONE HYDROCHLORIDE 1 MG/ML
0.5 INJECTION, SOLUTION INTRAMUSCULAR; INTRAVENOUS; SUBCUTANEOUS
Status: DISCONTINUED | OUTPATIENT
Start: 2017-11-30 | End: 2017-11-30 | Stop reason: HOSPADM

## 2017-11-30 RX ORDER — FENTANYL CITRATE 50 UG/ML
INJECTION, SOLUTION INTRAMUSCULAR; INTRAVENOUS AS NEEDED
Status: DISCONTINUED | OUTPATIENT
Start: 2017-11-30 | End: 2017-11-30 | Stop reason: HOSPADM

## 2017-11-30 RX ORDER — BUPIVACAINE HYDROCHLORIDE 2.5 MG/ML
INJECTION, SOLUTION EPIDURAL; INFILTRATION; INTRACAUDAL AS NEEDED
Status: DISCONTINUED | OUTPATIENT
Start: 2017-11-30 | End: 2017-11-30 | Stop reason: HOSPADM

## 2017-11-30 RX ORDER — GLYCOPYRROLATE 0.2 MG/ML
INJECTION INTRAMUSCULAR; INTRAVENOUS AS NEEDED
Status: DISCONTINUED | OUTPATIENT
Start: 2017-11-30 | End: 2017-11-30 | Stop reason: HOSPADM

## 2017-11-30 RX ORDER — FAMOTIDINE 20 MG/1
20 TABLET, FILM COATED ORAL ONCE
Status: COMPLETED | OUTPATIENT
Start: 2017-11-30 | End: 2017-11-30

## 2017-11-30 RX ORDER — PROPOFOL 10 MG/ML
INJECTION, EMULSION INTRAVENOUS AS NEEDED
Status: DISCONTINUED | OUTPATIENT
Start: 2017-11-30 | End: 2017-11-30 | Stop reason: HOSPADM

## 2017-11-30 RX ORDER — EPHEDRINE SULFATE/0.9% NACL/PF 25 MG/5 ML
SYRINGE (ML) INTRAVENOUS AS NEEDED
Status: DISCONTINUED | OUTPATIENT
Start: 2017-11-30 | End: 2017-11-30 | Stop reason: HOSPADM

## 2017-11-30 RX ORDER — LIDOCAINE HYDROCHLORIDE 20 MG/ML
INJECTION, SOLUTION EPIDURAL; INFILTRATION; INTRACAUDAL; PERINEURAL AS NEEDED
Status: DISCONTINUED | OUTPATIENT
Start: 2017-11-30 | End: 2017-11-30 | Stop reason: HOSPADM

## 2017-11-30 RX ORDER — ONDANSETRON 2 MG/ML
4 INJECTION INTRAMUSCULAR; INTRAVENOUS ONCE
Status: DISCONTINUED | OUTPATIENT
Start: 2017-11-30 | End: 2017-11-30 | Stop reason: HOSPADM

## 2017-11-30 RX ORDER — SODIUM CHLORIDE 0.9 % (FLUSH) 0.9 %
5-10 SYRINGE (ML) INJECTION AS NEEDED
Status: DISCONTINUED | OUTPATIENT
Start: 2017-11-30 | End: 2017-11-30 | Stop reason: HOSPADM

## 2017-11-30 RX ORDER — SODIUM CHLORIDE 0.9 % (FLUSH) 0.9 %
5-10 SYRINGE (ML) INJECTION EVERY 8 HOURS
Status: DISCONTINUED | OUTPATIENT
Start: 2017-11-30 | End: 2017-11-30 | Stop reason: HOSPADM

## 2017-11-30 RX ORDER — OXYCODONE AND ACETAMINOPHEN 5; 325 MG/1; MG/1
1 TABLET ORAL
Qty: 30 TAB | Refills: 0 | Status: SHIPPED | OUTPATIENT
Start: 2017-11-30 | End: 2018-07-06 | Stop reason: ALTCHOICE

## 2017-11-30 RX ORDER — PROMETHAZINE HYDROCHLORIDE 25 MG/ML
12.5 INJECTION, SOLUTION INTRAMUSCULAR; INTRAVENOUS
Status: DISCONTINUED | OUTPATIENT
Start: 2017-11-30 | End: 2017-11-30

## 2017-11-30 RX ORDER — DEXAMETHASONE SODIUM PHOSPHATE 4 MG/ML
INJECTION, SOLUTION INTRA-ARTICULAR; INTRALESIONAL; INTRAMUSCULAR; INTRAVENOUS; SOFT TISSUE AS NEEDED
Status: DISCONTINUED | OUTPATIENT
Start: 2017-11-30 | End: 2017-11-30 | Stop reason: HOSPADM

## 2017-11-30 RX ORDER — OXYCODONE AND ACETAMINOPHEN 5; 325 MG/1; MG/1
1 TABLET ORAL
Status: COMPLETED | OUTPATIENT
Start: 2017-11-30 | End: 2017-11-30

## 2017-11-30 RX ORDER — SODIUM CHLORIDE, SODIUM LACTATE, POTASSIUM CHLORIDE, CALCIUM CHLORIDE 600; 310; 30; 20 MG/100ML; MG/100ML; MG/100ML; MG/100ML
75 INJECTION, SOLUTION INTRAVENOUS CONTINUOUS
Status: DISCONTINUED | OUTPATIENT
Start: 2017-11-30 | End: 2017-11-30 | Stop reason: HOSPADM

## 2017-11-30 RX ORDER — ONDANSETRON 2 MG/ML
INJECTION INTRAMUSCULAR; INTRAVENOUS AS NEEDED
Status: DISCONTINUED | OUTPATIENT
Start: 2017-11-30 | End: 2017-11-30 | Stop reason: HOSPADM

## 2017-11-30 RX ORDER — MIDAZOLAM HYDROCHLORIDE 1 MG/ML
INJECTION, SOLUTION INTRAMUSCULAR; INTRAVENOUS AS NEEDED
Status: DISCONTINUED | OUTPATIENT
Start: 2017-11-30 | End: 2017-11-30 | Stop reason: HOSPADM

## 2017-11-30 RX ORDER — INSULIN LISPRO 100 [IU]/ML
INJECTION, SOLUTION INTRAVENOUS; SUBCUTANEOUS ONCE
Status: DISCONTINUED | OUTPATIENT
Start: 2017-11-30 | End: 2017-11-30 | Stop reason: HOSPADM

## 2017-11-30 RX ADMIN — HYDROMORPHONE HYDROCHLORIDE 0.5 MG: 1 INJECTION, SOLUTION INTRAMUSCULAR; INTRAVENOUS; SUBCUTANEOUS at 15:30

## 2017-11-30 RX ADMIN — Medication 10 MG: at 13:33

## 2017-11-30 RX ADMIN — GLYCOPYRROLATE 0.2 MG: 0.2 INJECTION INTRAMUSCULAR; INTRAVENOUS at 13:35

## 2017-11-30 RX ADMIN — PROPOFOL 150 MG: 10 INJECTION, EMULSION INTRAVENOUS at 12:58

## 2017-11-30 RX ADMIN — MIDAZOLAM HYDROCHLORIDE 2 MG: 1 INJECTION, SOLUTION INTRAMUSCULAR; INTRAVENOUS at 12:49

## 2017-11-30 RX ADMIN — LIDOCAINE HYDROCHLORIDE 100 MG: 20 INJECTION, SOLUTION EPIDURAL; INFILTRATION; INTRACAUDAL; PERINEURAL at 12:58

## 2017-11-30 RX ADMIN — FENTANYL CITRATE 25 MCG: 50 INJECTION, SOLUTION INTRAMUSCULAR; INTRAVENOUS at 13:45

## 2017-11-30 RX ADMIN — OXYCODONE HYDROCHLORIDE AND ACETAMINOPHEN 1 TABLET: 5; 325 TABLET ORAL at 16:43

## 2017-11-30 RX ADMIN — FENTANYL CITRATE 50 MCG: 50 INJECTION, SOLUTION INTRAMUSCULAR; INTRAVENOUS at 13:18

## 2017-11-30 RX ADMIN — HYDROMORPHONE HYDROCHLORIDE 0.5 MG: 1 INJECTION, SOLUTION INTRAMUSCULAR; INTRAVENOUS; SUBCUTANEOUS at 15:47

## 2017-11-30 RX ADMIN — PROPOFOL 50 MG: 10 INJECTION, EMULSION INTRAVENOUS at 13:18

## 2017-11-30 RX ADMIN — CLINDAMYCIN PHOSPHATE 900 MG: 150 INJECTION, SOLUTION, CONCENTRATE INTRAVENOUS at 12:49

## 2017-11-30 RX ADMIN — HYDROMORPHONE HYDROCHLORIDE 0.5 MG: 1 INJECTION, SOLUTION INTRAMUSCULAR; INTRAVENOUS; SUBCUTANEOUS at 15:09

## 2017-11-30 RX ADMIN — Medication 10 ML: at 07:15

## 2017-11-30 RX ADMIN — SODIUM CHLORIDE, SODIUM LACTATE, POTASSIUM CHLORIDE, AND CALCIUM CHLORIDE 75 ML/HR: 600; 310; 30; 20 INJECTION, SOLUTION INTRAVENOUS at 11:56

## 2017-11-30 RX ADMIN — DEXAMETHASONE SODIUM PHOSPHATE 4 MG: 4 INJECTION, SOLUTION INTRA-ARTICULAR; INTRALESIONAL; INTRAMUSCULAR; INTRAVENOUS; SOFT TISSUE at 13:06

## 2017-11-30 RX ADMIN — FAMOTIDINE 20 MG: 20 TABLET, FILM COATED ORAL at 07:20

## 2017-11-30 RX ADMIN — FENTANYL CITRATE 25 MCG: 50 INJECTION, SOLUTION INTRAMUSCULAR; INTRAVENOUS at 14:00

## 2017-11-30 RX ADMIN — ONDANSETRON 4 MG: 2 INJECTION INTRAMUSCULAR; INTRAVENOUS at 13:06

## 2017-11-30 NOTE — INTERVAL H&P NOTE
H&P Update:  Fariha Manzo was seen and examined. History and physical has been reviewed. The patient has been examined.  There have been no significant clinical changes since the completion of the originally dated History and Physical.    Signed By: Earl Urban MD     November 30, 2017 2:18 PM

## 2017-11-30 NOTE — ANESTHESIA PREPROCEDURE EVALUATION
Anesthetic History   No history of anesthetic complications            Review of Systems / Medical History  Patient summary reviewed and pertinent labs reviewed    Pulmonary  Within defined limits                 Neuro/Psych             Comments: Restless leg syndrome Cardiovascular    Hypertension: well controlled              Exercise tolerance: >4 METS     GI/Hepatic/Renal     GERD: well controlled        Pertinent negatives: Liver disease: Fattyn liver. Endo/Other        Morbid obesity     Other Findings   Comments:   Risk Factors for Postoperative nausea/vomiting:       History of postoperative nausea/vomiting? NO       Female? YES       Motion sickness? NO       Intended opioid administration for postoperative analgesia? NO      Smoking Abstinence  Current Smoker? NO  Elective Surgery? YES  Seen preoperatively by anesthesiologist or proxy prior to day of surgery? YES  Pt abstained from smoking 24 hours prior to anesthesia?  N/A           Physical Exam    Airway  Mallampati: II  TM Distance: 4 - 6 cm  Neck ROM: normal range of motion   Mouth opening: Normal     Cardiovascular  Regular rate and rhythm,  S1 and S2 normal,  no murmur, click, rub, or gallop             Dental  No notable dental hx       Pulmonary  Breath sounds clear to auscultation               Abdominal  GI exam deferred       Other Findings            Anesthetic Plan    ASA: 2  Anesthesia type: general          Induction: Intravenous  Anesthetic plan and risks discussed with: Patient

## 2017-11-30 NOTE — BRIEF OP NOTE
BRIEF OPERATIVE NOTE    Date of Procedure: 11/30/2017   Preoperative Diagnosis: lump or mass in breast(N63.0)right  Postoperative Diagnosis: lump or mass in breast(N63.0) right    Procedure(s):  RIGHT BREAST LUMPECTOMY WITH NEEDLE LOCALIZATION Cadence Ros LYMPH NODE BIOPSY   Surgeon(s) and Role:     * Pearl Fam MD - Primary         Assistant Staff:       Surgical Staff:  Circ-1: Magdiel Moore RN  Scrub Tech-1: Jeni Martino  Surg Asst-1: Marvin Pablo  Event Time In   Incision Start 476 [id number]   Incision Close      Anesthesia: General   Estimated Blood Loss: 0  Specimens:   ID Type Source Tests Collected by Time Destination   1 : right breast mass Needle Loc Breast Mass,Right  Pearl Fam MD 11/30/2017 1333 Pathology   2 : right sentinel lymph node Preservative Axilla  Pearl Fam MD 11/30/2017 1401 Pathology      Findings: clip in spec and Sentinal node removed   Complications: 0  Implants: * No implants in log *

## 2017-11-30 NOTE — H&P (VIEW-ONLY)
Progress Note    Patient: Valeriano Gamboa  MRN: [id number]  SSN: [id number]   YOB: 1950  Age: 78 y.o. Sex: female     No chief complaint on file. HPI    Ms. Carter Petersen is a 60-year-old woman with newly diagnosed left breast cancer. This was picked up on screening mammogram, and is small. History of breast cancer in her family her menarche was 7 and her first child at 33. She has no symptoms from this and is as it was found on screening mammogram.  Never had a breast biopsy or other risk factors. We discussed at length the pathophysiology of breast cancer as well as its treatment including surgery, radiation, chemotherapy, and hormone therapy. Her pathology shows a infiltrating intraductal carcinoma which is ER positive IL negative tumor. The HER-2 evaluation is pending. We have discussed what all this means. Her tumor on mammogram is small 8 mm or so.   I reviewed the images of both her ultrasound and her mammogram.    Past Medical History:   Diagnosis Date    Acute reaction to stress     Closed fracture of left wrist     Diastolic dysfunction     Fatty liver     GERD (gastroesophageal reflux disease)     Hyperlipidemia     Hypertension     diastolic dysfunction    Hypoglycemia     Kidney stones     11/16 for yrs; spon passage always so far    RSD (reflex sympathetic dystrophy) 8/2009    no blood pressure or sticks in left arm    Syncope 2000    no recurrence since; was orthostatic at that time    Unspecified adverse effect of anesthesia     hard to sedate     Past Surgical History:   Procedure Laterality Date    HX CARPAL TUNNEL RELEASE      HX GYN      lap. fibroid removal    HX HEART CATHETERIZATION  2005 approx    normal coronaries per pt    HX ORTHOPAEDIC      ORIF left wrist    HX SHOULDER ARTHROSCOPY Left 1988     Allergies   Allergen Reactions    Lipitor [Atorvastatin] Myalgia     Quit 2/17    Nsaids (Non-Steroidal Anti-Inflammatory Drug) Other (comments)     Severe abdominal pain    Pcn [Penicillins] Swelling     Swelling, hives & photosensitive rxn     Current Outpatient Prescriptions   Medication Sig Dispense Refill    losartan (COZAAR) 100 mg tablet Take 1 Tab by mouth daily. 30 Tab 5    labetalol (NORMODYNE) 200 mg tablet TAKE ONE TABLET BY MOUTH 2 TIMES A DAY 60 Tab 03    Dexlansoprazole (DEXILANT) 60 mg CpDB Take  by mouth Daily (before breakfast).  furosemide (LASIX) 20 mg tablet Take 1 Tab by mouth daily as needed. 30 Tab 2    albuterol (PROVENTIL HFA, VENTOLIN HFA, PROAIR HFA) 90 mcg/actuation inhaler Take 1 Puff by inhalation every four (4) hours as needed for Wheezing. 1 Inhaler 5    FLAXSEED OIL (OMEGA 3 PO) Take  by mouth.  cycloSPORINE (RESTASIS) 0.05 % ophthalmic emulsion Administer 1 Drop to both eyes two (2) times a day.  LORazepam (ATIVAN) 0.5 mg tablet Take 1 Tab by mouth two (2) times a day. Max Daily Amount: 1 mg. (Patient taking differently: Take 0.5 mg by mouth as needed.) 60 Tab 3    aluminum hydrox-magnesium carb (GAVISCON EXTRA STRENGTH) 160-105 mg chew Take 2 Tabs by mouth as needed.  acetaminophen (TYLENOL EXTRA STRENGTH) 500 mg tablet Take 1,000 mg by mouth every six (6) hours as needed. Social History     Social History    Marital status:      Spouse name: N/A    Number of children: N/A    Years of education: N/A     Occupational History    Not on file.      Social History Main Topics    Smoking status: Former Smoker     Packs/day: 0.50     Years: 11.00     Quit date: 1/1/2003    Smokeless tobacco: Never Used    Alcohol use 0.6 oz/week     1 Glasses of wine per week      Comment: rarely- 3-4 drinks/yr    Drug use: No    Sexual activity: Not Currently     Other Topics Concern    Not on file     Social History Narrative     Family History   Problem Relation Age of Onset    Diabetes Maternal Aunt     Cancer Maternal Uncle      throat    Cancer Maternal Grandmother      melanoma    Heart Disease Other     Heart Attack Paternal Grandmother 48    Cancer Maternal Grandfather      Melanoma    Stroke Neg Hx          Review of systems:  Patient denies any reflux, emesis, abdominal pain, change in bowel habits, hematochezia, melena, fever, weight loss, fatigue chills, dermatitis, abnormal moles, change in vision, vertigo, epistaxis, dysphagia, hoarseness, chest pain, palpitations, hypertension, edema, cough, shortness of breath, wheezing, hemoptysis, snoring, hematuria, diabetes, thyroid disease, anemia, bruising, history of blood transfusion, dizziness, headache, or fainting.     Physical Examination    Well developed well nourished female in no apparent distress  Visit Vitals    /80    Resp 16      Head: normocephalic, atraumatic  Mouth: Clear, no overt lesions, oral mucosa pink and moist  Neck: supple, no masses, no adenopathy or carotid bruits, trachea midline  Resp: clear to auscultation bilaterally, no wheeze, rhonchi or rales, excursions normal and symmetrical  Cardio: Regular rate and rhythm, no murmurs, clicks, gallops or rubs, no edema or varicosities  Abdomen: soft, nontender, nondistended, normoactive bowel sounds, no hernias, no hepatosplenomegaly,   Back: Deferred  Extremeties: warm, well-perfused, no tenderness or swelling, normal gait/station  Neuro: sensation and strength grossly intact and symmetrical  Psych: alert and oriented to person, place and time  Breast exam bilateral breast exam without dominant mass, skin change, nipple discharge, or lymphadenopathy    IMPRESSION  Likely an early stage left breast cancer    PLAN  Orders Placed This Encounter    [facility] NDL/WIRE/CLIP/SEED BREAST RT     Standing Status:   Future     Standing Expiration Date:   12/3/2018     Order Specific Question:   Reason for Exam     Answer:   R breast cancer    NM LYMPHOSCINTIG RT BREAST     Standing Status:   Future     Standing Expiration Date:   12/3/2018     Order Specific Question:   Reason for Exam Answer:   R breast cancer     Lumpectomy and sentinel lymph node biopsy followed by radiation  Christine Vale MD

## 2017-11-30 NOTE — DISCHARGE INSTRUCTIONS
Open Breast Biopsy: What to Expect at Home  Your Recovery  An open breast biopsy is surgery to take a sample of breast tissue. A breast biopsy may be done to check a lump found during a breast exam. Or it may be done to check an area of concern found on a mammogram or ultrasound. The breast tissue will be sent to a lab, where a doctor will look at the tissue under a microscope to check for breast cancer. Your doctor may have some answers right away. But it can take up to 1 to 2 weeks to get the final results. Your doctor will discuss the results with you. For a few days after the surgery, you will probably feel tired and have some pain. The skin around the cut (incision) may feel firm, swollen, and tender. The area may be bruised. Tenderness usually goes away in a few days, and the bruising within 2 weeks. Firmness and swelling may take 3 to 6 months to go away. The stitches in your incision may dissolve on their own, or the doctor may take them out 7 to 10 days after surgery. This care sheet gives you a general idea about how long it will take for you to recover. But each person recovers at a different pace. Follow the steps below to get better as quickly as possible. How can you care for yourself at home? Activity  ? · Rest when you feel tired. Getting enough sleep will help you recover. ? · Try to walk each day. Start by walking a little more than you did the day before. Bit by bit, increase the amount you walk. Walking boosts blood flow and helps prevent pneumonia and constipation. ? · For 2 weeks, avoid strenuous activities that put pressure on your chest or that involve vigorous movement of your upper body and arm on the side of the biopsy. Examples of these might include strenuous housework, holding an active child, jogging, or aerobic exercise. ? · For 2 weeks, avoid lifting anything that would make you strain.  This may include heavy grocery bags and milk containers, a heavy briefcase or backpack, cat litter or dog food bags, a vacuum , or a child. ? · Ask your doctor when you can drive again. ? · You will probably need to take 1 or 2 days off from work. This depends on the type of work you do and how you feel. Diet  ? · You can eat your normal diet. If your stomach is upset, try bland, low-fat foods like plain rice, broiled chicken, toast, and yogurt. Medicines  ? · Your doctor will tell you if and when you can restart your medicines. He or she will also give you instructions about taking any new medicines. ? · If you take blood thinners, such as warfarin (Coumadin), clopidogrel (Plavix), or aspirin, be sure to talk to your doctor. He or she will tell you if and when to start taking those medicines again. Make sure that you understand exactly what your doctor wants you to do. ? · Be safe with medicines. Take pain medicines exactly as directed. ¨ If the doctor gave you a prescription medicine for pain, take it as prescribed. ¨ If you are not taking a prescription pain medicine, ask your doctor if you can take an over-the-counter medicine. ? · If you think your pain medicine is making you sick to your stomach:  ¨ Take your medicine after meals (unless your doctor has told you not to). ¨ Ask your doctor for a different pain medicine. ? · If your doctor prescribed antibiotics, take them as directed. Do not stop taking them just because you feel better. You need to take the full course of antibiotics. Incision care  ? · If you have strips of tape on the incision, leave the tape on for a week or until it falls off.   ? · Wash the area daily with warm, soapy water, and pat it dry. Don't use hydrogen peroxide or alcohol, which can slow healing. You may cover the area with a gauze bandage if it weeps or rubs against clothing. Change the bandage every day. ? · Keep the area clean and dry. Other instructions  ?  · For the first 3 days after surgery, wear a supportive bra all the time, even at night. Follow-up care is a key part of your treatment and safety. Be sure to make and go to all appointments, and call your doctor if you are having problems. It's also a good idea to know your test results and keep a list of the medicines you take. When should you call for help? Call 911 anytime you think you may need emergency care. For example, call if:  ? · You passed out (lost consciousness). ? · You have chest pain, are short of breath, or cough up blood. ?Call your doctor now or seek immediate medical care if:  ? · You are sick to your stomach or cannot drink fluids. ? · You have pain that does not get better after you take pain medicine. ? · You cannot pass stools or gas. ? · You have signs of a blood clot in your leg (called a deep vein thrombosis), such as:  ¨ Pain in your calf, back of the knee, thigh, or groin. ¨ Redness or swelling in your leg. ? · You have signs of infection, such as:  ¨ Increased pain, swelling, warmth, or redness. ¨ Red streaks leading from the incision. ¨ Pus draining from the incision. ¨ A fever. ? · You have loose stitches, or your incision comes open. ? · Bright red blood has soaked through the bandage over your incision. ? Watch closely for changes in your health, and be sure to contact your doctor if:  ? · You have any problems. ? · You have new or worse swelling or pain in your arm. Where can you learn more? Go to http://christina-jose de jesus.info/. Enter G935 in the search box to learn more about \"Open Breast Biopsy: What to Expect at Home. \"  Current as of: May 12, 2017  Content Version: 11.4  © 4898-5803 [employer]. Care instructions adapted under license by [employer] (which disclaims liability or warranty for this information).  If you have questions about a medical condition or this instruction, always ask your healthcare professional. Jasper Porter disclaims any warranty or liability for your use of this information. [place] Lymph Node Biopsy: About This Test  What is it? A sentinel lymph node biopsy is a surgery to take out lymph node tissue to look for cancer that has spread into the lymph system. The lymph system is a network of vessels that carries material between the body tissues and the bloodstream. The sentinel lymph node is the first node in the body where cancer cells may be found if the cancer has spread from the original site. Why is this test done? This test is done to see if a cancer has spread from its original site. This information helps stage a cancer. The stage is a way for doctors to describe how far the cancer has spread. Your treatment choices will be based partly on the type and stage of cancer. How can you prepare for the test?  ? · Talk to your doctor about concerns you have regarding the need for the test, its risks, how it will be done, and what the results may mean. ? · Tell your doctor ALL the medicines, vitamins, supplements, and herbal remedies you take. ? · If you take aspirin or some other blood thinner, be sure to talk to your doctor. He or she will tell you if you should stop taking it before your test. Make sure that you understand exactly what your doctor wants you to do. ? · Tell your doctor if you are allergic to any medicines, including any anesthetics or dyes. ? · Tell your doctor if you are or might be pregnant. · Follow the instructions exactly about when to stop eating and drinking. If you don't, your test may be canceled. If your doctor told you to take your medicines on the day of the test, take them with only a sip of water. What happens before the test?  · You will take off your clothing near the biopsy site. A paper or cloth gown will cover you during the test.  · You will be kept comfortable and safe by your anesthesia provider. You will be asleep during the biopsy.   What happens during the test?  · The doctor injects a dye or tracer into your body near your cancer site. A special camera takes pictures of the lymph nodes. The pictures help the doctor identify the sentinel nodes. · The doctor gives you a numbing medicine, then removes the sentinel node and a small amount of tissue around it. The node is tested for cancer cells during the biopsy. The results help the doctor decide whether to remove any more nodes. · You will have some stitches and a bandage over the biopsy site. What else should you know about the test?  · If the sentinel lymph nodes do not have cancer, no other nodes will need to be taken out. · If the nodes contain cancer cells, more nodes may need to be removed. · If you are having surgery to remove the cancer, the sentinel node biopsy may be done during the surgery. How long does the test take? · The biopsy usually takes 30 to 60 minutes. It may take longer if you have surgery to remove the cancer at the same time. What happens after the test?  · The doctor will tell you what to do if you have any bleeding, numbness, or swelling at the biopsy site. · You might be able to go home the same day. Be sure to have someone drive you home. · Your skin may be blue from the dye for several days after the test. The dye may also turn your urine green for 1 to 2 days. · Allow the area to heal. Don't move quickly or lift anything heavy until you are feeling better. · During your follow-up visit, your doctor will discuss the results of your biopsy with you and take out any stitches. Follow-up care is a key part of your treatment and safety. Be sure to make and go to all appointments, and call your doctor if you are having problems. It's also a good idea to keep a list of the medicines you take. Ask your doctor when you can expect to have your test results. Where can you learn more? Go to http://christina-jose de jesus.info/.   Enter V333 in the search box to learn more about \"[place] Lymph Node Biopsy: About This Test.\"  Current as of: May 12, 2017  Content Version: 11.4  © 4330-1963 [employer]. Care instructions adapted under license by [employer] (which disclaims liability or warranty for this information). If you have questions about a medical condition or this instruction, always ask your healthcare professional. [address] any warranty or liability for your use of this information. Oxycodone/Acetaminophen (By mouth)   Acetaminophen (q-omoi-a-MIN-oh-fen), Oxycodone Hydrochloride (jh-f-LVP-done gigi-droe-KLOR-ruthie)  Treats moderate to moderately severe pain. This medicine is a narcotic pain reliever. Brand Name(s): Endocet, Percocet, Primlev, Xartemis XR   There may be other brand names for this medicine. When This Medicine Should Not Be Used: This medicine is not right for everyone. Do not use it if you had an allergic reaction to acetaminophen or oxycodone, or if you have serious breathing problems or paralytic ileus. How to Use This Medicine:   Capsule, Liquid, Tablet, Long Acting Tablet  · Your doctor will tell you how much medicine to use. Do not use more than directed. · An overdose can be dangerous. Follow directions carefully so you do not get too much medicine at one time. · Oral liquid: Measure the oral liquid medicine with a marked measuring spoon, oral syringe, or medicine cup. · Swallow the extended-release tablet whole. Do not crush, break, or chew it. Do not lick or wet the tablet before placing it in your mouth. Do not give this medicine through a feeding tube. · This medicine should come with a Medication Guide. Ask your pharmacist for a copy if you do not have one. · Missed dose: If you miss a dose of this medicine, skip the missed dose and go back to your regular dosing schedule. Do not double doses. · Store the medicine in a closed container at room temperature, away from heat, moisture, and direct light.  Ask your pharmacist about the best way to dispose of medicine you do not use. Drugs and Foods to Avoid:   Ask your doctor or pharmacist before using any other medicine, including over-the-counter medicines, vitamins, and herbal products. · Do not use Xartemis XR if you are using or have used an MAO inhibitor in the past 14 days. · Some medicines can affect how this medicine works. Tell your doctor if you are using any of the following:   ¨ Carbamazepine, erythromycin, ketoconazole, lamotrigine, mirtazapine, naltrexone, phenytoin, propranolol, rifampin, ritonavir, tramadol, trazodone, or zidovudine  ¨ Birth control pills  ¨ Diuretic (water pill)  ¨ Medicine to treat depression  ¨ Phenothiazine medicine  ¨ Triptan medicine to treat migraine headaches  · Do not drink alcohol while you are using this medicine. Acetaminophen can damage your liver, and alcohol can increase this risk. Do not take acetaminophen without asking your doctor if you have 3 or more drinks of alcohol every day. · Tell your doctor if you use anything else that makes you sleepy. Some examples are allergy medicine, narcotic pain medicine, and alcohol. Tell your doctor if you are using buprenorphine, butorphanol, nalbuphine, pentazocine, a benzodiazepine, or a muscle relaxer. Warnings While Using This Medicine:   · Tell your doctor if you are pregnant or breastfeeding, or if you have kidney disease, liver disease, heart disease, low blood pressure, breathing problems or lung disease (such as asthma, COPD), thyroid problems, Bertie disease, pancreas or gallbladder problems, prostate problems, trouble urinating, or a stomach problems, or a history of head injury or brain damage, seizures, or alcohol or drug abuse. Tell your doctor if you are allergic to codeine.   · This medicine may cause the following problems:  ¨ High risk of overdose, which can lead to death  ¨ Respiratory depression (serious breathing problem that can be life-threatening)  ¨ Liver problems  ¨ Serious skin reactions  ¨ Serotonin syndrome (when used with certain medicines)  · This medicine may make you dizzy or drowsy. Do not drive or do anything that could be dangerous until you know how this medicine affects you. Sit or lie down if you feel dizzy. Stand up carefully. · This medicine contains acetaminophen. Read the labels of all other medicines you are using to see if they also contain acetaminophen, or ask your doctor or pharmacist. [employer] not use more than 4 grams (4,000 milligrams) total of acetaminophen in one day. · This medicine can be habit-forming. Do not use more than your prescribed dose. Call your doctor if you think your medicine is not working. · Do not stop using this medicine suddenly. Your doctor will need to slowly decrease your dose before you stop it completely. · This medicine could cause infertility. Talk with your doctor before using this medicine if you plan to have children. · This medicine may cause constipation, especially with long-term use. Ask your doctor if you should use a laxative to prevent and treat constipation. · Keep all medicine out of the reach of children. Never share your medicine with anyone.   Possible Side Effects While Using This Medicine:   Call your doctor right away if you notice any of these side effects:  · Allergic reaction: Itching or hives, swelling in your face or hands, swelling or tingling in your mouth or throat, chest tightness, trouble breathing  · Anxiety, restlessness, fast heartbeat, fever, muscle spasms, twitching, diarrhea, seeing or hearing things that are not there  · Blistering, peeling, red skin rash  · Blue lips, fingernails, or skin  · Dark urine or pale stools, loss of appetite, stomach pain, yellow skin or eyes  · Extreme weakness, shallow breathing, uneven heartbeat, seizures, sweating, or cold or clammy skin  · Severe confusion, lightheadedness, dizziness, or fainting  · Severe constipation, nausea, or vomiting  · Trouble breathing or slow breathing  If you notice these less serious side effects, talk with your doctor:   · Headache  · Mild constipation, nausea, or vomiting  · Mild sleepiness or drowsiness  If you notice other side effects that you think are caused by this medicine, tell your doctor. Call your doctor for medical advice about side effects. You may report side effects to FDA at 5-036-FDA-3056  © 2017 [address] Information is for End User's use only and may not be sold, redistributed or otherwise used for commercial purposes. The above information is an  only. It is not intended as medical advice for individual conditions or treatments. Talk to your doctor, nurse or pharmacist before following any medical regimen to see if it is safe and effective for you. DISCHARGE SUMMARY from Nurse    PATIENT INSTRUCTIONS:    After general anesthesia or intravenous sedation, for 24 hours or while taking prescription Narcotics:  · Limit your activities  · Do not drive and operate hazardous machinery  · Do not make important personal or business decisions  · Do  not drink alcoholic beverages  · If you have not urinated within 8 hours after discharge, please contact your surgeon on call. Report the following to your surgeon:  · Excessive pain, swelling, redness or odor of or around the surgical area  · Temperature over 100.5  · Nausea and vomiting lasting longer than 4 hours or if unable to take medications  · Any signs of decreased circulation or nerve impairment to extremity: change in color, persistent  numbness, tingling, coldness or increase pain  · Any questions    What to do at Home:  Recommended activity: Activity as tolerated and no driving for today or while taking narcotic pain medication. *  Please give a list of your current medications to your Primary Care Provider.     *  Please update this list whenever your medications are discontinued, doses are changed, or new medications (including over-the-counter products) are added. *  Please carry medication information at all times in case of emergency situations. These are general instructions for a healthy lifestyle:    No smoking/ No tobacco products/ Avoid exposure to second hand smoke  Surgeon General's Warning:  Quitting smoking now greatly reduces serious risk to your health. Obesity, smoking, and sedentary lifestyle greatly increases your risk for illness    A healthy diet, regular physical exercise & weight monitoring are important for maintaining a healthy lifestyle    You may be retaining fluid if you have a history of heart failure or if you experience any of the following symptoms:  Weight gain of 3 pounds or more overnight or 5 pounds in a week, increased swelling in our hands or feet or shortness of breath while lying flat in bed. Please call your doctor as soon as you notice any of these symptoms; do not wait until your next office visit. Recognize signs and symptoms of STROKE:    F-face looks uneven    A-arms unable to move or move unevenly    S-speech slurred or non-existent    T-time-call 911 as soon as signs and symptoms begin-DO NOT go       Back to bed or wait to see if you get better-TIME IS BRAIN. Warning Signs of HEART ATTACK     Call 911 if you have these symptoms:   Chest discomfort. Most heart attacks involve discomfort in the center of the chest that lasts more than a few minutes, or that goes away and comes back. It can feel like uncomfortable pressure, squeezing, fullness, or pain.  Discomfort in other areas of the upper body. Symptoms can include pain or discomfort in one or both arms, the back, neck, jaw, or stomach.  Shortness of breath with or without chest discomfort.  Other signs may include breaking out in a cold sweat, nausea, or lightheadedness. Don't wait more than five minutes to call 911 - MINUTES MATTER! Fast action can save your life.  Calling 911 is almost always the fastest way to get lifesaving treatment. Emergency Medical Services staff can begin treatment when they arrive -- up to an hour sooner than if someone gets to the hospital by car. The discharge information has been reviewed with the patient and son. The patient and son verbalized understanding. Discharge medications reviewed with the patient and son and appropriate educational materials and side effects teaching were provided.   ___________________________________________________________________________________________________________________________________

## 2017-11-30 NOTE — IP AVS SNAPSHOT
Emily Meza 
 
 
 [address] [facility] [address] 
[phone] Patient: Ghada Justin MRN: [id number] CMR:5/06/1220 About your hospitalization You were admitted on:  November 30, 2017 You last received care in the:  [facility] PACU You were discharged on:  November 30, 2017 Why you were hospitalized Your primary diagnosis was:  Not on File Things You Need To Do (next 8 weeks) Follow up with Kimberlyn Morse MD  
  
Phone:  [phone] Where:  [address], [address] Wednesday Dec 13, 2017 Follow Up with Alessandra Wright MD at  9:00 AM  
Where: [address] ([place]) Discharge Orders Procedure Order Date Status Priority Quantity Spec Type Associated Dx DIET REGULAR 11/30/17 1418 Normal Routine 1  Malignant neoplasm of upper-outer quadrant of left breast in female, estrogen receptor negative [facility] [[id number]] A check davidson indicates which time of day the medication should be taken. My Medications TAKE these medications as instructed Instructions Each Dose to Equal  
 Morning Noon Evening Bedtime  
 albuterol 90 mcg/actuation inhaler Commonly known as:  PROVENTIL HFA, VENTOLIN HFA, PROAIR HFA Your last dose was: Your next dose is: Take 1 Puff by inhalation every four (4) hours as needed for Wheezing. 1 Puff DEXILANT 60 mg Cpdb Generic drug:  Dexlansoprazole Your last dose was: Your next dose is: Take  by mouth Daily (before breakfast). furosemide 20 mg tablet Commonly known as:  LASIX Your last dose was: Your next dose is: Take 1 Tab by mouth daily as needed. 20 mg  
    
   
   
   
  
 GAVISCON EXTRA STRENGTH 160-105 mg Brianne Sarah Generic drug:  aluminum hydrox-magnesium carb Your last dose was: Your next dose is: Take 2 Tabs by mouth as needed. 2 Tab  
    
   
   
   
  
 labetalol 200 mg tablet Commonly known as:  Fior Anuel Your last dose was: Your next dose is: TAKE ONE TABLET BY MOUTH 2 TIMES A DAY LORazepam 0.5 mg tablet Commonly known as:  ATIVAN Your last dose was: Your next dose is: Take 1 Tab by mouth two (2) times a day. Max Daily Amount: 1 mg. 0.5 mg  
    
   
   
   
  
 losartan 100 mg tablet Commonly known as:  COZAAR Your last dose was: Your next dose is: Take 1 Tab by mouth daily. 100 mg  
    
   
   
   
  
 OMEGA 3 PO Your last dose was: Your next dose is: Take  by mouth. oxyCODONE-acetaminophen 5-325 mg per tablet Commonly known as:  PERCOCET Your last dose was: Your next dose is: Take 1 Tab by mouth every six (6) hours as needed for Pain. Max Daily Amount: 4 Tabs. 1 Tab RESTASIS 0.05 % ophthalmic emulsion Generic drug:  cycloSPORINE Your last dose was: Your next dose is:    
   
   
 Administer 1 Drop to both eyes two (2) times a day. 1 Drop TYLENOL EXTRA STRENGTH 500 mg tablet Generic drug:  acetaminophen Your last dose was: Your next dose is: Take 1,000 mg by mouth every six (6) hours as needed. 1000 mg Where to Get Your Medications Information on where to get these meds will be given to you by the nurse or doctor. ! Ask your nurse or doctor about these medications  
  oxyCODONE-acetaminophen 5-325 mg per tablet Discharge Instructions Open Breast Biopsy: What to Expect at Home Your Recovery An open breast biopsy is surgery to take a sample of breast tissue.  A breast biopsy may be done to check a lump found during a breast exam. Or it may be done to check an area of concern found on a mammogram or ultrasound. The breast tissue will be sent to a lab, where a doctor will look at the tissue under a microscope to check for breast cancer. Your doctor may have some answers right away. But it can take up to 1 to 2 weeks to get the final results. Your doctor will discuss the results with you. For a few days after the surgery, you will probably feel tired and have some pain. The skin around the cut (incision) may feel firm, swollen, and tender. The area may be bruised. Tenderness usually goes away in a few days, and the bruising within 2 weeks. Firmness and swelling may take 3 to 6 months to go away. The stitches in your incision may dissolve on their own, or the doctor may take them out 7 to 10 days after surgery. This care sheet gives you a general idea about how long it will take for you to recover. But each person recovers at a different pace. Follow the steps below to get better as quickly as possible. How can you care for yourself at home? Activity ? · Rest when you feel tired. Getting enough sleep will help you recover. ? · Try to walk each day. Start by walking a little more than you did the day before. Bit by bit, increase the amount you walk. Walking boosts blood flow and helps prevent pneumonia and constipation. ? · For 2 weeks, avoid strenuous activities that put pressure on your chest or that involve vigorous movement of your upper body and arm on the side of the biopsy. Examples of these might include strenuous housework, holding an active child, jogging, or aerobic exercise. ? · For 2 weeks, avoid lifting anything that would make you strain. This may include heavy grocery bags and milk containers, a heavy briefcase or backpack, cat litter or dog food bags, a vacuum , or a child. ? · Ask your doctor when you can drive again. ? · You will probably need to take 1 or 2 days off from work. This depends on the type of work you do and how you feel. Diet ? · You can eat your normal diet. If your stomach is upset, try bland, low-fat foods like plain rice, broiled chicken, toast, and yogurt. Medicines ? · Your doctor will tell you if and when you can restart your medicines. He or she will also give you instructions about taking any new medicines. ? · If you take blood thinners, such as warfarin (Coumadin), clopidogrel (Plavix), or aspirin, be sure to talk to your doctor. He or she will tell you if and when to start taking those medicines again. Make sure that you understand exactly what your doctor wants you to do. ? · Be safe with medicines. Take pain medicines exactly as directed. ¨ If the doctor gave you a prescription medicine for pain, take it as prescribed. ¨ If you are not taking a prescription pain medicine, ask your doctor if you can take an over-the-counter medicine. ? · If you think your pain medicine is making you sick to your stomach: 
¨ Take your medicine after meals (unless your doctor has told you not to). ¨ Ask your doctor for a different pain medicine. ? · If your doctor prescribed antibiotics, take them as directed. Do not stop taking them just because you feel better. You need to take the full course of antibiotics. Incision care ? · If you have strips of tape on the incision, leave the tape on for a week or until it falls off.  
? · Wash the area daily with warm, soapy water, and pat it dry. Don't use hydrogen peroxide or alcohol, which can slow healing. You may cover the area with a gauze bandage if it weeps or rubs against clothing. Change the bandage every day. ? · Keep the area clean and dry. Other instructions ? · For the first 3 days after surgery, wear a supportive bra all the time, even at night. Follow-up care is a key part of your treatment and safety.  Be sure to make and go to all appointments, and call your doctor if you are having problems. It's also a good idea to know your test results and keep a list of the medicines you take. When should you call for help? Call 911 anytime you think you may need emergency care. For example, call if: 
? · You passed out (lost consciousness). ? · You have chest pain, are short of breath, or cough up blood. ?Call your doctor now or seek immediate medical care if: 
? · You are sick to your stomach or cannot drink fluids. ? · You have pain that does not get better after you take pain medicine. ? · You cannot pass stools or gas. ? · You have signs of a blood clot in your leg (called a deep vein thrombosis), such as: 
¨ Pain in your calf, back of the knee, thigh, or groin. ¨ Redness or swelling in your leg. ? · You have signs of infection, such as: 
¨ Increased pain, swelling, warmth, or redness. ¨ Red streaks leading from the incision. ¨ Pus draining from the incision. ¨ A fever. ? · You have loose stitches, or your incision comes open. ? · Bright red blood has soaked through the bandage over your incision. ? Watch closely for changes in your health, and be sure to contact your doctor if: 
? · You have any problems. ? · You have new or worse swelling or pain in your arm. Where can you learn more? Go to http://christina-jose de jesus.info/. Enter C966 in the search box to learn more about \"Open Breast Biopsy: What to Expect at Home. \" Current as of: May 12, 2017 Content Version: 11.4 © 5112-0908 [employer]. Care instructions adapted under license by [employer] (which disclaims liability or warranty for this information). If you have questions about a medical condition or this instruction, always ask your healthcare professional. [address] any warranty or liability for your use of this information.  
 
  
[place] Lymph Node Biopsy: About This Test 
 What is it? A sentinel lymph node biopsy is a surgery to take out lymph node tissue to look for cancer that has spread into the lymph system. The lymph system is a network of vessels that carries material between the body tissues and the bloodstream. The sentinel lymph node is the first node in the body where cancer cells may be found if the cancer has spread from the original site. Why is this test done? This test is done to see if a cancer has spread from its original site. This information helps stage a cancer. The stage is a way for doctors to describe how far the cancer has spread. Your treatment choices will be based partly on the type and stage of cancer. How can you prepare for the test? 
? · Talk to your doctor about concerns you have regarding the need for the test, its risks, how it will be done, and what the results may mean. ? · Tell your doctor ALL the medicines, vitamins, supplements, and herbal remedies you take. ? · If you take aspirin or some other blood thinner, be sure to talk to your doctor. He or she will tell you if you should stop taking it before your test. Make sure that you understand exactly what your doctor wants you to do. ? · Tell your doctor if you are allergic to any medicines, including any anesthetics or dyes. ? · Tell your doctor if you are or might be pregnant. · Follow the instructions exactly about when to stop eating and drinking. If you don't, your test may be canceled. If your doctor told you to take your medicines on the day of the test, take them with only a sip of water. What happens before the test? 
· You will take off your clothing near the biopsy site. A paper or cloth gown will cover you during the test. 
· You will be kept comfortable and safe by your anesthesia provider. You will be asleep during the biopsy. What happens during the test? 
· The doctor injects a dye or tracer into your body near your cancer site. A special camera takes pictures of the lymph nodes. The pictures help the doctor identify the sentinel nodes. · The doctor gives you a numbing medicine, then removes the sentinel node and a small amount of tissue around it. The node is tested for cancer cells during the biopsy. The results help the doctor decide whether to remove any more nodes. · You will have some stitches and a bandage over the biopsy site. What else should you know about the test? 
· If the sentinel lymph nodes do not have cancer, no other nodes will need to be taken out. · If the nodes contain cancer cells, more nodes may need to be removed. · If you are having surgery to remove the cancer, the sentinel node biopsy may be done during the surgery. How long does the test take? · The biopsy usually takes 30 to 60 minutes. It may take longer if you have surgery to remove the cancer at the same time. What happens after the test? 
· The doctor will tell you what to do if you have any bleeding, numbness, or swelling at the biopsy site. · You might be able to go home the same day. Be sure to have someone drive you home. · Your skin may be blue from the dye for several days after the test. The dye may also turn your urine green for 1 to 2 days. · Allow the area to heal. Don't move quickly or lift anything heavy until you are feeling better. · During your follow-up visit, your doctor will discuss the results of your biopsy with you and take out any stitches. Follow-up care is a key part of your treatment and safety. Be sure to make and go to all appointments, and call your doctor if you are having problems. It's also a good idea to keep a list of the medicines you take. Ask your doctor when you can expect to have your test results. Where can you learn more? Go to http://christina-jose de jesus.info/. Enter V333 in the search box to learn more about \"[place] Lymph Node Biopsy: About This Test.\" Current as of: May 12, 2017 Content Version: 11.4 © 0728-6908 [employer]. Care instructions adapted under license by [employer] (which disclaims liability or warranty for this information). If you have questions about a medical condition or this instruction, always ask your healthcare professional. [address] any warranty or liability for your use of this information. Oxycodone/Acetaminophen (By mouth) Acetaminophen (a-irzc-s-MIN-oh-fen), Oxycodone Hydrochloride (bs-b-MFZ-done gigi-droe-KLOR-ruthie) Treats moderate to moderately severe pain. This medicine is a narcotic pain reliever. Brand Name(s): Endocet, Percocet, Primlev, Xartemis XR There may be other brand names for this medicine. When This Medicine Should Not Be Used: This medicine is not right for everyone. Do not use it if you had an allergic reaction to acetaminophen or oxycodone, or if you have serious breathing problems or paralytic ileus. How to Use This Medicine:  
Capsule, Liquid, Tablet, Long Acting Tablet · Your doctor will tell you how much medicine to use. Do not use more than directed. · An overdose can be dangerous. Follow directions carefully so you do not get too much medicine at one time. · Oral liquid: Measure the oral liquid medicine with a marked measuring spoon, oral syringe, or medicine cup. · Swallow the extended-release tablet whole. Do not crush, break, or chew it. Do not lick or wet the tablet before placing it in your mouth. Do not give this medicine through a feeding tube. · This medicine should come with a Medication Guide. Ask your pharmacist for a copy if you do not have one. · Missed dose: If you miss a dose of this medicine, skip the missed dose and go back to your regular dosing schedule. Do not double doses. · Store the medicine in a closed container at room temperature, away from heat, moisture, and direct light.  Ask your pharmacist about the best way to dispose of medicine you do not use. Drugs and Foods to Avoid: Ask your doctor or pharmacist before using any other medicine, including over-the-counter medicines, vitamins, and herbal products. · Do not use Xartemis XR if you are using or have used an MAO inhibitor in the past 14 days. · Some medicines can affect how this medicine works. Tell your doctor if you are using any of the following: ¨ Carbamazepine, erythromycin, ketoconazole, lamotrigine, mirtazapine, naltrexone, phenytoin, propranolol, rifampin, ritonavir, tramadol, trazodone, or zidovudine ¨ Birth control pills ¨ Diuretic (water pill) ¨ Medicine to treat depression ¨ Phenothiazine medicine ¨ Triptan medicine to treat migraine headaches · Do not drink alcohol while you are using this medicine. Acetaminophen can damage your liver, and alcohol can increase this risk. Do not take acetaminophen without asking your doctor if you have 3 or more drinks of alcohol every day. · Tell your doctor if you use anything else that makes you sleepy. Some examples are allergy medicine, narcotic pain medicine, and alcohol. Tell your doctor if you are using buprenorphine, butorphanol, nalbuphine, pentazocine, a benzodiazepine, or a muscle relaxer. Warnings While Using This Medicine: · Tell your doctor if you are pregnant or breastfeeding, or if you have kidney disease, liver disease, heart disease, low blood pressure, breathing problems or lung disease (such as asthma, COPD), thyroid problems, Lawrence disease, pancreas or gallbladder problems, prostate problems, trouble urinating, or a stomach problems, or a history of head injury or brain damage, seizures, or alcohol or drug abuse. Tell your doctor if you are allergic to codeine. · This medicine may cause the following problems: 
¨ High risk of overdose, which can lead to death ¨ Respiratory depression (serious breathing problem that can be life-threatening) ¨ Liver problems ¨ Serious skin reactions ¨ Serotonin syndrome (when used with certain medicines) · This medicine may make you dizzy or drowsy. Do not drive or do anything that could be dangerous until you know how this medicine affects you. Sit or lie down if you feel dizzy. Stand up carefully. · This medicine contains acetaminophen. Read the labels of all other medicines you are using to see if they also contain acetaminophen, or ask your doctor or pharmacist. Amalia Marcelino not use more than 4 grams (4,000 milligrams) total of acetaminophen in one day. · This medicine can be habit-forming. Do not use more than your prescribed dose. Call your doctor if you think your medicine is not working. · Do not stop using this medicine suddenly. Your doctor will need to slowly decrease your dose before you stop it completely. · This medicine could cause infertility. Talk with your doctor before using this medicine if you plan to have children. · This medicine may cause constipation, especially with long-term use. Ask your doctor if you should use a laxative to prevent and treat constipation. · Keep all medicine out of the reach of children. Never share your medicine with anyone. Possible Side Effects While Using This Medicine:  
Call your doctor right away if you notice any of these side effects: · Allergic reaction: Itching or hives, swelling in your face or hands, swelling or tingling in your mouth or throat, chest tightness, trouble breathing · Anxiety, restlessness, fast heartbeat, fever, muscle spasms, twitching, diarrhea, seeing or hearing things that are not there · Blistering, peeling, red skin rash · Blue lips, fingernails, or skin · Dark urine or pale stools, loss of appetite, stomach pain, yellow skin or eyes · Extreme weakness, shallow breathing, uneven heartbeat, seizures, sweating, or cold or clammy skin · Severe confusion, lightheadedness, dizziness, or fainting · Severe constipation, nausea, or vomiting · Trouble breathing or slow breathing If you notice these less serious side effects, talk with your doctor:  
· Headache · Mild constipation, nausea, or vomiting · Mild sleepiness or drowsiness If you notice other side effects that you think are caused by this medicine, tell your doctor. Call your doctor for medical advice about side effects. You may report side effects to FDA at 4-740-FDA-8081 © 2017 [address] Information is for End User's use only and may not be sold, redistributed or otherwise used for commercial purposes. The above information is an  only. It is not intended as medical advice for individual conditions or treatments. Talk to your doctor, nurse or pharmacist before following any medical regimen to see if it is safe and effective for you. DISCHARGE SUMMARY from Nurse PATIENT INSTRUCTIONS: 
 
After general anesthesia or intravenous sedation, for 24 hours or while taking prescription Narcotics: · Limit your activities · Do not drive and operate hazardous machinery · Do not make important personal or business decisions · Do  not drink alcoholic beverages · If you have not urinated within 8 hours after discharge, please contact your surgeon on call. Report the following to your surgeon: 
· Excessive pain, swelling, redness or odor of or around the surgical area · Temperature over 100.5 · Nausea and vomiting lasting longer than 4 hours or if unable to take medications · Any signs of decreased circulation or nerve impairment to extremity: change in color, persistent  numbness, tingling, coldness or increase pain · Any questions What to do at Home: 
Recommended activity: Activity as tolerated and no driving for today or while taking narcotic pain medication. *  Please give a list of your current medications to your Primary Care Provider.  
 
*  Please update this list whenever your medications are discontinued, doses are 
 changed, or new medications (including over-the-counter products) are added. *  Please carry medication information at all times in case of emergency situations. These are general instructions for a healthy lifestyle: No smoking/ No tobacco products/ Avoid exposure to second hand smoke Surgeon General's Warning:  Quitting smoking now greatly reduces serious risk to your health. Obesity, smoking, and sedentary lifestyle greatly increases your risk for illness A healthy diet, regular physical exercise & weight monitoring are important for maintaining a healthy lifestyle You may be retaining fluid if you have a history of heart failure or if you experience any of the following symptoms:  Weight gain of 3 pounds or more overnight or 5 pounds in a week, increased swelling in our hands or feet or shortness of breath while lying flat in bed. Please call your doctor as soon as you notice any of these symptoms; do not wait until your next office visit. Recognize signs and symptoms of STROKE: 
 
F-face looks uneven A-arms unable to move or move unevenly S-speech slurred or non-existent T-time-call 911 as soon as signs and symptoms begin-DO NOT go Back to bed or wait to see if you get better-TIME IS BRAIN. Warning Signs of HEART ATTACK Call 911 if you have these symptoms: 
? Chest discomfort. Most heart attacks involve discomfort in the center of the chest that lasts more than a few minutes, or that goes away and comes back. It can feel like uncomfortable pressure, squeezing, fullness, or pain. ? Discomfort in other areas of the upper body. Symptoms can include pain or discomfort in one or both arms, the back, neck, jaw, or stomach. ? Shortness of breath with or without chest discomfort. ? Other signs may include breaking out in a cold sweat, nausea, or lightheadedness. Don't wait more than five minutes to call [address]!  Fast action can save your life. Calling 911 is almost always the fastest way to get lifesaving treatment. Emergency Medical Services staff can begin treatment when they arrive  up to an hour sooner than if someone gets to the hospital by car. The discharge information has been reviewed with the patient and son. The patient and son verbalized understanding. Discharge medications reviewed with the patient and son and appropriate educational materials and side effects teaching were provided. ___________________________________________________________________________________________________________________________________ Introducing [address]! Dear Clovis Jamison: Thank you for requesting a [employer] account. Our records indicate that you already have an active [employer] account. You can access your account anytime at [email or web address] Did you know that you can access your hospital and ER discharge instructions at any time in [employer]? You can also review all of your test results from your hospital stay or ER visit. Additional Information If you have questions, please visit the Frequently Asked Questions section of the [employer] website at [email or web address] Remember, [employer] is NOT to be used for urgent needs. For medical emergencies, dial 911. Now available from your iPhone and Android! Unresulted Labs-Please follow up with your PCP about these lab tests Order Current Status NAOMI BREAST RT SURG SPEC In process Providers Seen During Your Hospitalization Provider Specialty Primary office phone Melissa Chavarria MD Surgery [phone] Your Primary Care Physician (PCP) Primary Care Physician Office Phone Office Fax [phone], [address] [place] [address] Road [facility] [phone] You are allergic to the following Allergen Reactions Lipitor (Atorvastatin) Myalgia Quit 2/17 Nsaids (Non-Steroidal Anti-Inflammatory Drug) Other (comments) Severe abdominal pain  
    
 Pcn (Penicillins) Swelling Swelling, hives & photosensitive rxn Recent Documentation Height Weight BMI OB Status Smoking Status 1.549 m 81.7 kg 34.03 kg/m2 Postmenopausal Former Smoker Emergency Contacts Name Discharge Info Relation Home Work Mobile Ashkan CAREGIVER [3] Friend [5] [phone] PutnamMelecio galvez DISCHARGE CAREGIVER [3] Son [22] [phone] Patient Belongings The following personal items are in your possession at time of discharge: 
  Dental Appliances: None  Visual Aid: Glasses      Home Medications: Sent home (given to Son)   Jewelry: None  Clothing: Undergarments, Jacket/Coat, Socks, Footwear, Shirt, Pants    Other Valuables: Mary, [employer] Please provide this summary of care documentation to your next provider. Signatures-by signing, you are acknowledging that this After Visit Summary has been reviewed with you and you have received a copy. Patient Signature:  ____________________________________________________________ Date:  ____________________________________________________________  
  
Janyth [facility] Provider Signature:  ____________________________________________________________ Date:  ____________________________________________________________

## 2017-11-30 NOTE — PERIOP NOTES
Patient discharged from facility via wheelchair. Patient's son had discharge instructions and prescription in hand. Patient armband removed and shredded.

## 2017-11-30 NOTE — ANESTHESIA POSTPROCEDURE EVALUATION
Post-Anesthesia Evaluation and Assessment    Patient: Alina Tom MRN: [id number]  SSN: [id number]    YOB: 1950  Age: 78 y.o. Sex: female       Cardiovascular Function/Vital Signs  Visit Vitals    /72    Pulse 84    Temp 36.3 °C (97.3 °F)    Resp 12    Ht 5' 1\" (1.549 m)    Wt 81.7 kg (180 lb 2 oz)    SpO2 94%    BMI 34.03 kg/m2       Patient is status post general anesthesia for Procedure(s):  RIGHT BREAST LUMPECTOMY WITH NEEDLE LOCALIZATION /SENTINEL LYMPH NODE BIOPSY . Nausea/Vomiting: None    Postoperative hydration reviewed and adequate. Pain:  Pain Scale 1: Numeric (0 - 10) (11/30/17 1602)  Pain Intensity 1: 3 (11/30/17 1602)   Managed    Neurological Status:   Neuro (WDL): Within Defined Limits (11/30/17 1448)   At baseline    Mental Status and Level of Consciousness: Arousable    Pulmonary Status:   O2 Device: Room air (11/30/17 1450)   Adequate oxygenation and airway patent    Complications related to anesthesia: None    Post-anesthesia assessment completed.  No concerns    Signed By: Polina Silva MD     November 30, 2017

## 2017-12-03 DIAGNOSIS — Z17.0 MALIGNANT NEOPLASM OF UPPER-OUTER QUADRANT OF LEFT BREAST IN FEMALE, ESTROGEN RECEPTOR POSITIVE (HCC): ICD-10-CM

## 2017-12-03 DIAGNOSIS — C50.412 MALIGNANT NEOPLASM OF UPPER-OUTER QUADRANT OF LEFT BREAST IN FEMALE, ESTROGEN RECEPTOR POSITIVE (HCC): ICD-10-CM

## 2017-12-04 NOTE — OP NOTES
[address]    Name:  Jacey Bourne  MR#:  [id number]  :  1950  Account #:  [de-identified]  Date of Adm:  2017  Date of Surgery:  2017      PREOPERATIVE DIAGNOSIS: Right breast cancer. POSTOPERATIVE DIAGNOSIS: Right breast cancer. PROCEDURES PERFORMED:  1. Right breast lumpectomy using needle localization. 2. [place] lymph node biopsy. SURGEON: Juan Hicks MD.    ASSISTANT: .    ESTIMATED BLOOD LOSS: Minimal.    COMPLICATIONS: None. ANESTHESIA: General.    SPECIMENS REMOVED: Right breast mass and sentinel lymph node. INDICATION: The patient is a 79-year-old woman who presents with a  positive biopsy in her right breast for cancer. She was initially taken the  day before surgery to the nuclear medicine suite, where she was  injected with radionuclide and a lymphoscintigraphy performed  showing 1 sentinel lymph node. On the day of surgery she came to the  hospital and was localized using needle localization mammography. She was then brought to the operating room. DESCRIPTION OF PROCEDURE: After appropriate antibiotics and  sequential compression stockings the patient was taken to the  operating room and placed in a supine position on the OR table. After  adequate general anesthesia her right breast and chest were prepped  and draped to [facility] standard. A curvilinear periareolar incision was  created, and Bovie electrocautery was used to take down the  subcutaneous tissue into the breast tissue. Dissection medial was  performed using the cautery and an area that contained both  bracketing needles was removed. This was an approximately palm-  sized area. It was marked for orientation with silk sutures long lateral,  short medial, and double stitch anterior. It was sent to Specimen  Mammogram and specimen mammogram revealed the clip and the  microcalcifications within the specimen.  Copious irrigation was carried  out and suctioned free, and hemostasis of the breast was noted. The  wound was then closed with 3-0 Vicryl and 4-0 Monocryl. Attention was then turned to her axilla, and using the NeoProbe an  area of high counts per second was found. Initially this was  approximately 50 counts per second. An axillary incision was created  and Bovie electrocautery was used to take down the subcutaneous  tissue and blunt dissection into the axilla was performed. Using the  NeoProbe an area of high counts per second, over 100 counts per  second, was found and dissection down to this area revealed a lymph  node. Once at the lymph node the counts per second was  approximately 490. The lymph node was then dissected and removed  and confirmed to be the sentinel node. After this the counts per second  in the axilla was no more than 15-20. Copious irrigation was carried out  and suctioned free and the wound in the axilla was closed in the same  manner with 3-0 Vicryl and 4-0 Monocryl. Steri-Strips and sterile  dressings were applied. Marcaine 0.25% without epinephrine was used  around the wounds. She tolerated the procedure well and was taken to  recovery in stable condition.         MD Prince Ornelas / Becky Acosta  D:  12/03/2017   15:43  T:  12/03/2017   23:24  Job #:  [id number]

## 2017-12-13 ENCOUNTER — DOCUMENTATION ONLY (OUTPATIENT)
Dept: SURGERY | Age: 67
End: 2017-12-13

## 2017-12-13 ENCOUNTER — OFFICE VISIT (OUTPATIENT)
Dept: SURGERY | Age: 67
End: 2017-12-13

## 2017-12-13 VITALS — SYSTOLIC BLOOD PRESSURE: 130 MMHG | DIASTOLIC BLOOD PRESSURE: 70 MMHG | RESPIRATION RATE: 16 BRPM

## 2017-12-13 DIAGNOSIS — Z85.3 PERSONAL HISTORY OF BREAST CANCER: Primary | ICD-10-CM

## 2017-12-13 NOTE — LETTER
NOTIFICATION RETURN TO WORK / SCHOOL 
 
12/13/2017 9:51 AM 
 
Ms. Randle Castleman [address] To Whom It May Concern: 
 
Randle Castleman is currently under the care of [facility]. She will return to work/school on: 1/13/18 If there are questions or concerns please have the patient contact our office.  
 
 
 
Sincerely, 
 
 
Davidson Mcneill MD

## 2017-12-13 NOTE — PROGRESS NOTES
Progress Note    Patient: Alina Or  MRN: [id number]  SSN: [id number]   YOB: 1950  Age: 78 y.o. Sex: female     Chief Complaint   Patient presents with    Post OP Follow Up     right lumpectomy        HPI    Ms. Diomedes White returns after her lumpectomy and sentinel lymph node biopsy. She looks great and her wounds are well-healed. Her pathology showed a T1b N0 MX lesion that was ER and Her2 positive, WI neg. she is being given appointments today for radiation oncology as well as medical oncology. I discussed the Mediport whether should she need one for her Herceptin therapy.     Past Medical History:   Diagnosis Date    Acute reaction to stress     Closed fracture of left wrist     Diastolic dysfunction     Fatty liver     GERD (gastroesophageal reflux disease)     Hyperlipidemia     Hypertension     diastolic dysfunction    Hypoglycemia     Kidney stones     11/16 for yrs; spon passage always so far    RSD (reflex sympathetic dystrophy) 8/2009    no blood pressure or sticks in left arm    Syncope 2000    no recurrence since; was orthostatic at that time    Unspecified adverse effect of anesthesia     hard to sedate     Past Surgical History:   Procedure Laterality Date    HX CARPAL TUNNEL RELEASE      HX GYN      lap. fibroid removal    HX HEART CATHETERIZATION  2005 approx    normal coronaries per pt    HX MASTECTOMY Right 11/30/2017    RIGHT BREAST LUMPECTOMY WITH NEEDLE LOCALIZATION Lawrence Pedlar LYMPH NODE BIOPSY  performed by Alejandra Arredondo MD at [address] HX ORTHOPAEDIC      ORIF left wrist    HX SHOULDER ARTHROSCOPY Left 1988     Allergies   Allergen Reactions    Lipitor [Atorvastatin] Myalgia     Quit 2/17    Nsaids (Non-Steroidal Anti-Inflammatory Drug) Other (comments)     Severe abdominal pain    Pcn [Penicillins] Swelling     Swelling, hives & photosensitive rxn     Current Outpatient Prescriptions   Medication Sig Dispense Refill    losartan (COZAAR) 100 mg tablet Take 1 Tab by mouth daily. 30 Tab 5    labetalol (NORMODYNE) 200 mg tablet TAKE ONE TABLET BY MOUTH 2 TIMES A DAY 60 Tab 03    furosemide (LASIX) 20 mg tablet Take 1 Tab by mouth daily as needed. 30 Tab 2    albuterol (PROVENTIL HFA, VENTOLIN HFA, PROAIR HFA) 90 mcg/actuation inhaler Take 1 Puff by inhalation every four (4) hours as needed for Wheezing. 1 Inhaler 5    FLAXSEED OIL (OMEGA 3 PO) Take  by mouth.  Dexlansoprazole (DEXILANT) 60 mg CpDB Take  by mouth Daily (before breakfast).  cycloSPORINE (RESTASIS) 0.05 % ophthalmic emulsion Administer 1 Drop to both eyes two (2) times a day.  LORazepam (ATIVAN) 0.5 mg tablet Take 1 Tab by mouth two (2) times a day. Max Daily Amount: 1 mg. (Patient taking differently: Take 0.5 mg by mouth as needed.) 60 Tab 3    aluminum hydrox-magnesium carb (GAVISCON EXTRA STRENGTH) 160-105 mg chew Take 2 Tabs by mouth as needed.  acetaminophen (TYLENOL EXTRA STRENGTH) 500 mg tablet Take 1,000 mg by mouth every six (6) hours as needed.  oxyCODONE-acetaminophen (PERCOCET) 5-325 mg per tablet Take 1 Tab by mouth every six (6) hours as needed for Pain. Max Daily Amount: 4 Tabs. 27 Tab 0     Social History     Social History    Marital status:      Spouse name: N/A    Number of children: N/A    Years of education: N/A     Occupational History    Not on file.      Social History Main Topics    Smoking status: Former Smoker     Packs/day: 0.50     Years: 11.00     Quit date: 1/1/2003    Smokeless tobacco: Never Used    Alcohol use 0.6 oz/week     1 Glasses of wine per week      Comment: rarely- 3-4 drinks/yr    Drug use: No    Sexual activity: Not Currently     Other Topics Concern    Not on file     Social History Narrative     Family History   Problem Relation Age of Onset    Diabetes Maternal Aunt     Cancer Maternal Uncle      throat    Cancer Maternal Grandmother      melanoma    Heart Disease Other     Heart Attack Paternal Grandmother 48    Cancer Maternal Grandfather      Melanoma    Stroke Neg Hx          Review of systems:  Patient denies any reflux, emesis, abdominal pain, change in bowel habits, hematochezia, melena, fever, weight loss, fatigue chills, dermatitis, abnormal moles, change in vision, vertigo, epistaxis, dysphagia, hoarseness, chest pain, palpitations, hypertension, edema, cough, shortness of breath, wheezing, hemoptysis, snoring, hematuria, diabetes, thyroid disease, anemia, bruising, history of blood transfusion, dizziness, headache, or fainting. Physical Examination    Well developed well nourished female in no apparent distress  Visit Vitals    /70    Resp 16      Head: normocephalic, atraumatic  Mouth: Clear, no overt lesions, oral mucosa pink and moist  Neck: supple, no masses, no adenopathy or carotid bruits, trachea midline  Resp: clear to auscultation bilaterally, no wheeze, rhonchi or rales, excursions normal and symmetrical  Cardio: Regular rate and rhythm, no murmurs, clicks, gallops or rubs, no edema or varicosities  Abdomen: soft, nontender, nondistended, normoactive bowel sounds, no hernias, no hepatosplenomegaly,   Back: Deferred  Extremeties: warm, well-perfused, no tenderness or swelling, normal gait/station  Neuro: sensation and strength grossly intact and symmetrical  Psych: alert and oriented to person, place and time  Breast exam right breast with well-healed incisions and no hematoma    IMPRESSION  Early stage right breast cancer now completely excised    PLAN  No orders of the defined types were placed in this encounter.     She is off to see radiation and medical oncology follow-up in a month  Christine Vale MD

## 2017-12-13 NOTE — PROGRESS NOTES
Spoke w/ pt prior to her office visit today. Pt had R breast lumpectomy w/ sentinel lymph node biopsy on 11/30/17. Pt states she is having \"some discomfort\" from the surgery but that it's \"manageable\". Pt completed distress screen at this time. Pt denies needs or concerns. Encouraged pt to attend breast cancer support group and flyer given. Contact info given for nurse navigator and told pt to call with any questions or concerns. Pt verbalized understanding.

## 2017-12-22 ENCOUNTER — HOSPITAL ENCOUNTER (OUTPATIENT)
Dept: RADIATION THERAPY | Age: 67
Discharge: HOME | End: 2017-12-22
Payer: COMMERCIAL

## 2017-12-22 PROCEDURE — 99211 OFF/OP EST MAY X REQ PHY/QHP: CPT

## 2017-12-28 ENCOUNTER — HOSPITAL ENCOUNTER (OUTPATIENT)
Dept: RADIATION THERAPY | Age: 67
Discharge: HOME | End: 2017-12-28
Payer: COMMERCIAL

## 2017-12-28 PROCEDURE — 77290 THER RAD SIMULAJ FIELD CPLX: CPT

## 2017-12-28 PROCEDURE — 77332 RADIATION TREATMENT AID(S): CPT

## 2017-12-29 ENCOUNTER — HOSPITAL ENCOUNTER (OUTPATIENT)
Dept: RADIATION THERAPY | Age: 67
Discharge: HOME | End: 2017-12-29
Payer: COMMERCIAL

## 2017-12-29 PROCEDURE — 77295 3-D RADIOTHERAPY PLAN: CPT

## 2017-12-29 PROCEDURE — 77300 RADIATION THERAPY DOSE PLAN: CPT

## 2017-12-29 PROCEDURE — 77334 RADIATION TREATMENT AID(S): CPT

## 2018-01-04 ENCOUNTER — APPOINTMENT (OUTPATIENT)
Dept: RADIATION THERAPY | Age: 68
End: 2018-01-04
Payer: COMMERCIAL

## 2018-01-05 ENCOUNTER — APPOINTMENT (OUTPATIENT)
Dept: RADIATION THERAPY | Age: 68
End: 2018-01-05
Payer: COMMERCIAL

## 2018-01-08 ENCOUNTER — APPOINTMENT (OUTPATIENT)
Dept: RADIATION THERAPY | Age: 68
End: 2018-01-08
Payer: COMMERCIAL

## 2018-01-09 ENCOUNTER — APPOINTMENT (OUTPATIENT)
Dept: RADIATION THERAPY | Age: 68
End: 2018-01-09
Payer: COMMERCIAL

## 2018-01-09 ENCOUNTER — HOSPITAL ENCOUNTER (OUTPATIENT)
Dept: RADIATION THERAPY | Age: 68
Discharge: HOME | End: 2018-01-09
Payer: COMMERCIAL

## 2018-01-09 PROCEDURE — 77412 RADIATION TX DELIVERY LVL 3: CPT

## 2018-01-09 PROCEDURE — 77280 THER RAD SIMULAJ FIELD SMPL: CPT

## 2018-01-10 ENCOUNTER — APPOINTMENT (OUTPATIENT)
Dept: RADIATION THERAPY | Age: 68
End: 2018-01-10
Payer: COMMERCIAL

## 2018-01-10 ENCOUNTER — HOSPITAL ENCOUNTER (OUTPATIENT)
Dept: RADIATION THERAPY | Age: 68
Discharge: HOME | End: 2018-01-10
Payer: COMMERCIAL

## 2018-01-10 PROCEDURE — 77412 RADIATION TX DELIVERY LVL 3: CPT

## 2018-01-11 ENCOUNTER — APPOINTMENT (OUTPATIENT)
Dept: RADIATION THERAPY | Age: 68
End: 2018-01-11
Payer: COMMERCIAL

## 2018-01-11 ENCOUNTER — HOSPITAL ENCOUNTER (OUTPATIENT)
Dept: RADIATION THERAPY | Age: 68
Discharge: HOME | End: 2018-01-11
Payer: COMMERCIAL

## 2018-01-11 PROCEDURE — 77412 RADIATION TX DELIVERY LVL 3: CPT

## 2018-01-12 ENCOUNTER — HOSPITAL ENCOUNTER (OUTPATIENT)
Dept: RADIATION THERAPY | Age: 68
Discharge: HOME | End: 2018-01-12
Payer: COMMERCIAL

## 2018-01-12 ENCOUNTER — OFFICE VISIT (OUTPATIENT)
Dept: SURGERY | Age: 68
End: 2018-01-12

## 2018-01-12 ENCOUNTER — APPOINTMENT (OUTPATIENT)
Dept: RADIATION THERAPY | Age: 68
End: 2018-01-12
Payer: COMMERCIAL

## 2018-01-12 VITALS — SYSTOLIC BLOOD PRESSURE: 130 MMHG | DIASTOLIC BLOOD PRESSURE: 74 MMHG | RESPIRATION RATE: 16 BRPM

## 2018-01-12 DIAGNOSIS — Z85.3 PERSONAL HISTORY OF BREAST CANCER: Primary | ICD-10-CM

## 2018-01-12 PROCEDURE — 77412 RADIATION TX DELIVERY LVL 3: CPT

## 2018-01-12 RX ORDER — LETROZOLE 2.5 MG/1
2.5 TABLET, FILM COATED ORAL DAILY
COMMUNITY
End: 2021-07-26

## 2018-01-12 NOTE — PROGRESS NOTES
Progress Note    Patient: Di Ferrer  MRN: [id number]  SSN: [id number]   YOB: 1950  Age: 78 y.o. Sex: female     Chief Complaint   Patient presents with    Follow-up       HPI    Ms. Simone Hernandes returns for a wound check. She is 4 treatments into her radiation therapy at this point. She feels tired but otherwise okay. Her wounds look good and her breast surgical site is soft. He is going to continue with her radiation therapy and will see her back in a few months. Past Medical History:   Diagnosis Date    Acute reaction to stress     Closed fracture of left wrist     Diastolic dysfunction     Fatty liver     GERD (gastroesophageal reflux disease)     Hyperlipidemia     Hypertension     diastolic dysfunction    Hypoglycemia     Kidney stones     11/16 for yrs; spon passage always so far    RSD (reflex sympathetic dystrophy) 8/2009    no blood pressure or sticks in left arm    Syncope 2000    no recurrence since; was orthostatic at that time    Unspecified adverse effect of anesthesia     hard to sedate     Past Surgical History:   Procedure Laterality Date    HX CARPAL TUNNEL RELEASE      HX GYN      lap. fibroid removal    HX HEART CATHETERIZATION  2005 approx    normal coronaries per pt    HX MASTECTOMY Right 11/30/2017    RIGHT BREAST LUMPECTOMY WITH NEEDLE LOCALIZATION Elsi Ya LYMPH NODE BIOPSY  performed by Lenore Anguinao MD at [address] [place] [address] HX ORTHOPAEDIC      ORIF left wrist    HX SHOULDER ARTHROSCOPY Left 1988     Allergies   Allergen Reactions    Lipitor [Atorvastatin] Myalgia     Quit 2/17    Nsaids (Non-Steroidal Anti-Inflammatory Drug) Other (comments)     Severe abdominal pain    Pcn [Penicillins] Swelling     Swelling, hives & photosensitive rxn     Current Outpatient Prescriptions   Medication Sig Dispense Refill    letrozole (FEMARA) 2.5 mg tablet Take 2.5 mg by mouth daily.       oxyCODONE-acetaminophen (PERCOCET) 5-325 mg per tablet Take 1 Tab by mouth every six (6) hours as needed for Pain. Max Daily Amount: 4 Tabs. 30 Tab 0    losartan (COZAAR) 100 mg tablet Take 1 Tab by mouth daily. 30 Tab 5    labetalol (NORMODYNE) 200 mg tablet TAKE ONE TABLET BY MOUTH 2 TIMES A DAY 60 Tab 03    furosemide (LASIX) 20 mg tablet Take 1 Tab by mouth daily as needed. 30 Tab 2    albuterol (PROVENTIL HFA, VENTOLIN HFA, PROAIR HFA) 90 mcg/actuation inhaler Take 1 Puff by inhalation every four (4) hours as needed for Wheezing. 1 Inhaler 5    FLAXSEED OIL (OMEGA 3 PO) Take  by mouth.  Dexlansoprazole (DEXILANT) 60 mg CpDB Take  by mouth Daily (before breakfast).  cycloSPORINE (RESTASIS) 0.05 % ophthalmic emulsion Administer 1 Drop to both eyes two (2) times a day.  LORazepam (ATIVAN) 0.5 mg tablet Take 1 Tab by mouth two (2) times a day. Max Daily Amount: 1 mg. (Patient taking differently: Take 0.5 mg by mouth as needed.) 60 Tab 3    aluminum hydrox-magnesium carb (GAVISCON EXTRA STRENGTH) 160-105 mg chew Take 2 Tabs by mouth as needed.  acetaminophen (TYLENOL EXTRA STRENGTH) 500 mg tablet Take 1,000 mg by mouth every six (6) hours as needed. Social History     Social History    Marital status:      Spouse name: N/A    Number of children: N/A    Years of education: N/A     Occupational History    Not on file.      Social History Main Topics    Smoking status: Former Smoker     Packs/day: 0.50     Years: 11.00     Quit date: 1/1/2003    Smokeless tobacco: Never Used    Alcohol use 0.6 oz/week     1 Glasses of wine per week      Comment: rarely- 3-4 drinks/yr    Drug use: No    Sexual activity: Not Currently     Other Topics Concern    Not on file     Social History Narrative     Family History   Problem Relation Age of Onset    Diabetes Maternal Aunt     Cancer Maternal Uncle      throat    Cancer Maternal Grandmother      melanoma    Heart Disease Other     Heart Attack Paternal Grandmother 48    Cancer Maternal Grandfather Melanoma    Stroke Neg Hx          Review of systems:  Patient denies any reflux, emesis, abdominal pain, change in bowel habits, hematochezia, melena, fever, weight loss, fatigue chills, dermatitis, abnormal moles, change in vision, vertigo, epistaxis, dysphagia, hoarseness, chest pain, palpitations, hypertension, edema, cough, shortness of breath, wheezing, hemoptysis, snoring, hematuria, diabetes, thyroid disease, anemia, bruising, history of blood transfusion, dizziness, headache, or fainting. Physical Examination    Well developed well nourished female in no apparent distress  Visit Vitals    /74    Resp 16      Head: normocephalic, atraumatic  Mouth: Clear, no overt lesions, oral mucosa pink and moist  Neck: supple, no masses, no adenopathy or carotid bruits, trachea midline  Resp: clear to auscultation bilaterally, no wheeze, rhonchi or rales, excursions normal and symmetrical  Cardio: Regular rate and rhythm, no murmurs, clicks, gallops or rubs, no edema or varicosities  Abdomen: soft, nontender, nondistended, normoactive bowel sounds, no hernias, no hepatosplenomegaly,   Back: Furred  Extremeties: warm, well-perfused, no tenderness or swelling, normal gait/station  Neuro: sensation and strength grossly intact and symmetrical  Psych: alert and oriented to person, place and time  Breast exam right breast with well-healed wounds    IMPRESSION  ER pos. right breast cancer, T1b N0 M0    PLAN  Orders Placed This Encounter    letrozole (FEMARA) 2.5 mg tablet     Sig: Take 2.5 mg by mouth daily.      Up in 3 months  Du Echevarria MD

## 2018-01-15 ENCOUNTER — HOSPITAL ENCOUNTER (OUTPATIENT)
Dept: RADIATION THERAPY | Age: 68
Discharge: HOME | End: 2018-01-15
Payer: COMMERCIAL

## 2018-01-15 ENCOUNTER — APPOINTMENT (OUTPATIENT)
Dept: RADIATION THERAPY | Age: 68
End: 2018-01-15
Payer: COMMERCIAL

## 2018-01-15 PROCEDURE — 77412 RADIATION TX DELIVERY LVL 3: CPT

## 2018-01-15 PROCEDURE — 77336 RADIATION PHYSICS CONSULT: CPT

## 2018-01-16 ENCOUNTER — OFFICE VISIT (OUTPATIENT)
Dept: INTERNAL MEDICINE CLINIC | Age: 68
End: 2018-01-16

## 2018-01-16 ENCOUNTER — HOSPITAL ENCOUNTER (OUTPATIENT)
Dept: RADIATION THERAPY | Age: 68
Discharge: HOME | End: 2018-01-16
Payer: COMMERCIAL

## 2018-01-16 VITALS
SYSTOLIC BLOOD PRESSURE: 130 MMHG | RESPIRATION RATE: 16 BRPM | OXYGEN SATURATION: 98 % | HEIGHT: 61 IN | DIASTOLIC BLOOD PRESSURE: 80 MMHG | WEIGHT: 184 LBS | HEART RATE: 73 BPM | TEMPERATURE: 98.1 F | BODY MASS INDEX: 34.74 KG/M2

## 2018-01-16 DIAGNOSIS — I10 ESSENTIAL HYPERTENSION: ICD-10-CM

## 2018-01-16 DIAGNOSIS — M25.511 ACUTE PAIN OF RIGHT SHOULDER: ICD-10-CM

## 2018-01-16 DIAGNOSIS — Z85.3 HISTORY OF RIGHT BREAST CANCER: Primary | ICD-10-CM

## 2018-01-16 PROBLEM — C50.412 BREAST CANCER OF UPPER-OUTER QUADRANT OF LEFT FEMALE BREAST (HCC): Status: RESOLVED | Noted: 2017-11-03 | Resolved: 2018-01-16

## 2018-01-16 PROCEDURE — 77417 THER RADIOLOGY PORT IMAGE(S): CPT

## 2018-01-16 PROCEDURE — 77412 RADIATION TX DELIVERY LVL 3: CPT

## 2018-01-16 RX ORDER — LIDOCAINE 50 MG/G
PATCH TOPICAL
Qty: 1 PACKAGE | Refills: 0 | Status: SHIPPED | OUTPATIENT
Start: 2018-01-16 | End: 2018-07-06 | Stop reason: ALTCHOICE

## 2018-01-16 RX ORDER — GABAPENTIN 300 MG/1
300 CAPSULE ORAL 2 TIMES DAILY
COMMUNITY
End: 2018-01-16 | Stop reason: SDUPTHER

## 2018-01-16 RX ORDER — GABAPENTIN 300 MG/1
CAPSULE ORAL
Qty: 90 CAP | Refills: 0 | Status: SHIPPED | OUTPATIENT
Start: 2018-01-16 | End: 2018-07-06 | Stop reason: ALTCHOICE

## 2018-01-16 NOTE — PROGRESS NOTES
1. Have you been to the ER, urgent care clinic since your last visit? Hospitalized since your last visit? No    2. Have you seen or consulted any other health care providers outside of the [address] [place] [address] since your last visit? Include any pap smears or colon screening.  No

## 2018-01-17 ENCOUNTER — HOSPITAL ENCOUNTER (OUTPATIENT)
Dept: RADIATION THERAPY | Age: 68
Discharge: HOME | End: 2018-01-17
Payer: COMMERCIAL

## 2018-01-17 ENCOUNTER — APPOINTMENT (OUTPATIENT)
Dept: RADIATION THERAPY | Age: 68
End: 2018-01-17
Payer: COMMERCIAL

## 2018-01-17 PROCEDURE — 77412 RADIATION TX DELIVERY LVL 3: CPT

## 2018-01-18 ENCOUNTER — APPOINTMENT (OUTPATIENT)
Dept: RADIATION THERAPY | Age: 68
End: 2018-01-18
Payer: COMMERCIAL

## 2018-01-18 ENCOUNTER — HOSPITAL ENCOUNTER (OUTPATIENT)
Dept: RADIATION THERAPY | Age: 68
Discharge: HOME | End: 2018-01-18
Payer: COMMERCIAL

## 2018-01-18 PROCEDURE — 77412 RADIATION TX DELIVERY LVL 3: CPT

## 2018-01-18 NOTE — PROGRESS NOTES
The patient presents to the office today with the chief complaint of right shoulder pain    HPI    The patient is status post surgery on her right breast for breast cancer. She is now receiving RT. The patient had the abrupt onset of severe right arm pain - originating from her right shoulder down her right arm to the elbow. The problem was severe for 24 hours and now has quieted some. The pain was present at rest but worse with movement. Review of Systems   Respiratory: Negative for shortness of breath. Cardiovascular: Negative for chest pain and leg swelling. Musculoskeletal: Positive for joint pain. Allergies   Allergen Reactions    Lipitor [Atorvastatin] Myalgia     Quit 2/17    Nsaids (Non-Steroidal Anti-Inflammatory Drug) Other (comments)     Severe abdominal pain    Pcn [Penicillins] Swelling     Swelling, hives & photosensitive rxn       Current Outpatient Prescriptions   Medication Sig Dispense Refill    gabapentin (NEURONTIN) 300 mg capsule 1 tablet three times per day 90 Cap 0    lidocaine (LIDODERM) 5 % Apply patch to the affected area for 12 hours a day and remove for 12 hours a day. 1 Package 0    letrozole (FEMARA) 2.5 mg tablet Take 2.5 mg by mouth daily.  oxyCODONE-acetaminophen (PERCOCET) 5-325 mg per tablet Take 1 Tab by mouth every six (6) hours as needed for Pain. Max Daily Amount: 4 Tabs. 30 Tab 0    losartan (COZAAR) 100 mg tablet Take 1 Tab by mouth daily. 30 Tab 5    labetalol (NORMODYNE) 200 mg tablet TAKE ONE TABLET BY MOUTH 2 TIMES A DAY 60 Tab 03    furosemide (LASIX) 20 mg tablet Take 1 Tab by mouth daily as needed. 30 Tab 2    albuterol (PROVENTIL HFA, VENTOLIN HFA, PROAIR HFA) 90 mcg/actuation inhaler Take 1 Puff by inhalation every four (4) hours as needed for Wheezing. 1 Inhaler 5    FLAXSEED OIL (OMEGA 3 PO) Take  by mouth.  Dexlansoprazole (DEXILANT) 60 mg CpDB Take  by mouth Daily (before breakfast).       cycloSPORINE (RESTASIS) 0.05 % ophthalmic emulsion Administer 1 Drop to both eyes two (2) times a day.  LORazepam (ATIVAN) 0.5 mg tablet Take 1 Tab by mouth two (2) times a day. Max Daily Amount: 1 mg. (Patient taking differently: Take 0.5 mg by mouth as needed.) 60 Tab 3    aluminum hydrox-magnesium carb (GAVISCON EXTRA STRENGTH) 160-105 mg chew Take 2 Tabs by mouth as needed.  acetaminophen (TYLENOL EXTRA STRENGTH) 500 mg tablet Take 1,000 mg by mouth every six (6) hours as needed. Past Medical History:   Diagnosis Date    Acute reaction to stress     Closed fracture of left wrist     Diastolic dysfunction     Fatty liver     GERD (gastroesophageal reflux disease)     Hyperlipidemia     Hypertension     diastolic dysfunction    Hypoglycemia     Kidney stones     11/16 for yrs; spon passage always so far    RSD (reflex sympathetic dystrophy) 8/2009    no blood pressure or sticks in left arm    Syncope 2000    no recurrence since; was orthostatic at that time    Unspecified adverse effect of anesthesia     hard to sedate       Past Surgical History:   Procedure Laterality Date    HX CARPAL TUNNEL RELEASE      HX GYN      lap. fibroid removal    HX HEART CATHETERIZATION  2005 approx    normal coronaries per pt    HX MASTECTOMY Right 11/30/2017    RIGHT BREAST LUMPECTOMY WITH NEEDLE LOCALIZATION Ileana Majano LYMPH NODE BIOPSY  performed by Michele Mendiola MD at [address] [place] [address] HX ORTHOPAEDIC      ORIF left wrist    HX SHOULDER ARTHROSCOPY Left 1988       Social History     Social History    Marital status:      Spouse name: N/A    Number of children: N/A    Years of education: N/A     Occupational History    Not on file.      Social History Main Topics    Smoking status: Former Smoker     Packs/day: 0.50     Years: 11.00     Quit date: 1/1/2003    Smokeless tobacco: Never Used    Alcohol use 0.6 oz/week     1 Glasses of wine per week      Comment: rarely- 3-4 drinks/yr    Drug use: No    Sexual activity: Not Currently     Other Topics Concern    Not on file     Social History Narrative       Patient does not have an advanced directive on file    Visit Vitals    /80 (BP 1 Location: Left arm, BP Patient Position: Sitting)    Pulse 73    Temp 98.1 °F (36.7 °C) (Tympanic)    Resp 16    Ht 5' 1\" (1.549 m)    Wt 184 lb (83.5 kg)    SpO2 98%    BMI 34.77 kg/m2       Physical Exam   Cardiovascular: Normal rate and regular rhythm. Exam reveals no gallop. No murmur heard. Pulmonary/Chest: She has no wheezes. She has no rales. Abdominal: Soft. She exhibits no distension. There is no tenderness. Musculoskeletal:        Arms:      BMI:  Is high but will not be addressed until the patient has recovered from surgery and finishes RT        Orders Only on 12/03/2017   Component Date Value Ref Range Status    WBC 11/27/2017 6.5  4.0 - 11.0 K/uL Final    RBC 11/27/2017 4.13  3.80 - 5.20 M/uL Final    HGB 11/27/2017 12.6  11.7 - 16.1 g/dL Final    HCT 11/27/2017 38.4  35.1 - 48.3 % Final    MCV 11/27/2017 93  80 - 95 fL Final    MCH 11/27/2017 31  26 - 34 pg Final    MCHC 11/27/2017 33  32 - 36 g/dL Final    RDW 11/27/2017 13.5  10.0 - 16.0 % Final    PLATELET 15/68/9321 293  140 - 440 K/uL Final    MPV 11/27/2017 10.3  6.0 - 10.8 fL Final    NEUTROPHILS 11/27/2017 62  40 - 75 % Final    Lymphocytes 11/27/2017 26* 27 - 45 % Final    MONOCYTES 11/27/2017 10* 3 - 9 % Final    EOSINOPHILS 11/27/2017 1  0 - 6 % Final    BASOPHILS 11/27/2017 0  0 - 2 % Final    ABS. NEUTROPHILS 11/27/2017 4.0  1.8 - 7.7 K/uL Final    ABSOLUTE LYMPHOCYTE COUNT 11/27/2017 1.7  1.0 - 4.8 K/uL Final    ABS. MONOCYTES 11/27/2017 0.7  0.1 - 0.9 K/uL Final    ABS. EOSINOPHILS 11/27/2017 0.1  0.0 - 0.5 K/uL Final    ABS.  BASOPHILS 11/27/2017 0.0  0.0 - 0.2 K/uL Final    Glucose 11/27/2017 82  70 - 99 mg/dL Final    BUN 11/27/2017 10  6 - 22 mg/dL Final    Creatinine 11/27/2017 0.8  0.8 - 1.4 mg/dL Final    Sodium 11/27/2017 144  133 - 145 mmol/L Final    Potassium 11/27/2017 4.8  3.5 - 5.5 mmol/L Final    Chloride 11/27/2017 103  98 - 110 mmol/L Final    CO2 11/27/2017 28  20 - 32 mmol/L Final    Calcium 11/27/2017 9.3  8.4 - 10.5 mg/dL Final    Anion gap 11/27/2017 13.0  mmol/L Final    Comment: Test includes BUN, CO2, Chloride, Creatinine, Glucose, Potassium, Calcium and  Sodium. Estimated GFR results are reported in mL/min/1.73 sq.m. by the MDRD equation. This eGFR is validated for stable chronic renal failure patients. This   equation  is unreliable in acute illness or patients with normal renal function.  GFRAA 11/27/2017 >60.0  >60.0 Final    GFRNA 11/27/2017 >60.0  >60.0 Final   Hospital Outpatient Visit on 11/27/2017   Component Date Value Ref Range Status    [facility] SPECIMEN COL 11/27/2017 Specimens collected/sent to [facility] Outpatient Visit on 11/27/2017   Component Date Value Ref Range Status    Ventricular Rate 11/27/2017 80  BPM Final    Atrial Rate 11/27/2017 80  BPM Final    P-R Interval 11/27/2017 172  ms Final    QRS Duration 11/27/2017 82  ms Final    Q-T Interval 11/27/2017 378  ms Final    QTC Calculation (Bezet) 11/27/2017 435  ms Final    Calculated P Axis 11/27/2017 38  degrees Final    Calculated R Axis 11/27/2017 48  degrees Final    Calculated T Axis 11/27/2017 34  degrees Final    Diagnosis 11/27/2017    Final                    Value:Normal sinus rhythm  T wave abnormality, consider anterior ischemia  Abnormal ECG  When compared with ECG of 26-OCT-2016 12:52,  T wave inversion no longer evident in Inferior leads  Confirmed by Shonna Sicard MD, --- ([id number]) on 11/27/2017 4:35:43 PM         .No results found for any visits on 01/16/18. Assessment / Plan      ICD-10-CM ICD-9-CM    1. History of right breast cancer Z85.3 V10.3    2. Essential hypertension I10 401.9    3.  Acute pain of right shoulder M25.511 719.41        Lidoderm patch   she was advised to continue her maintenance medications      Follow-up Disposition:  Return in about 4 months (around 5/16/2018). I asked Matthias Stewart if she has any questions and I answered the questions.   Matthias Stewart states that she understands the treatment plan and agrees with the treatment plan

## 2018-01-19 ENCOUNTER — APPOINTMENT (OUTPATIENT)
Dept: RADIATION THERAPY | Age: 68
End: 2018-01-19
Payer: COMMERCIAL

## 2018-01-22 ENCOUNTER — APPOINTMENT (OUTPATIENT)
Dept: RADIATION THERAPY | Age: 68
End: 2018-01-22
Payer: COMMERCIAL

## 2018-01-22 ENCOUNTER — HOSPITAL ENCOUNTER (OUTPATIENT)
Dept: RADIATION THERAPY | Age: 68
Discharge: HOME | End: 2018-01-22
Payer: COMMERCIAL

## 2018-01-22 PROCEDURE — 77412 RADIATION TX DELIVERY LVL 3: CPT

## 2018-01-23 ENCOUNTER — APPOINTMENT (OUTPATIENT)
Dept: RADIATION THERAPY | Age: 68
End: 2018-01-23
Payer: COMMERCIAL

## 2018-01-23 ENCOUNTER — HOSPITAL ENCOUNTER (OUTPATIENT)
Dept: RADIATION THERAPY | Age: 68
Discharge: HOME | End: 2018-01-23
Payer: COMMERCIAL

## 2018-01-23 PROCEDURE — 77336 RADIATION PHYSICS CONSULT: CPT

## 2018-01-23 PROCEDURE — 77412 RADIATION TX DELIVERY LVL 3: CPT

## 2018-01-24 ENCOUNTER — APPOINTMENT (OUTPATIENT)
Dept: RADIATION THERAPY | Age: 68
End: 2018-01-24
Payer: COMMERCIAL

## 2018-01-24 ENCOUNTER — HOSPITAL ENCOUNTER (OUTPATIENT)
Dept: RADIATION THERAPY | Age: 68
Discharge: HOME | End: 2018-01-24
Payer: COMMERCIAL

## 2018-01-24 PROCEDURE — 77331 SPECIAL RADIATION DOSIMETRY: CPT

## 2018-01-24 PROCEDURE — 77412 RADIATION TX DELIVERY LVL 3: CPT

## 2018-01-25 ENCOUNTER — HOSPITAL ENCOUNTER (OUTPATIENT)
Dept: RADIATION THERAPY | Age: 68
Discharge: HOME | End: 2018-01-25
Payer: COMMERCIAL

## 2018-01-25 ENCOUNTER — APPOINTMENT (OUTPATIENT)
Dept: RADIATION THERAPY | Age: 68
End: 2018-01-25
Payer: COMMERCIAL

## 2018-01-25 PROCEDURE — 77412 RADIATION TX DELIVERY LVL 3: CPT

## 2018-01-25 PROCEDURE — 77417 THER RADIOLOGY PORT IMAGE(S): CPT

## 2018-01-26 ENCOUNTER — HOSPITAL ENCOUNTER (OUTPATIENT)
Dept: RADIATION THERAPY | Age: 68
Discharge: HOME | End: 2018-01-26
Payer: COMMERCIAL

## 2018-01-26 ENCOUNTER — APPOINTMENT (OUTPATIENT)
Dept: RADIATION THERAPY | Age: 68
End: 2018-01-26
Payer: COMMERCIAL

## 2018-01-26 PROCEDURE — 77412 RADIATION TX DELIVERY LVL 3: CPT

## 2018-01-29 ENCOUNTER — HOSPITAL ENCOUNTER (OUTPATIENT)
Dept: RADIATION THERAPY | Age: 68
Discharge: HOME | End: 2018-01-29
Payer: COMMERCIAL

## 2018-01-29 ENCOUNTER — APPOINTMENT (OUTPATIENT)
Dept: RADIATION THERAPY | Age: 68
End: 2018-01-29
Payer: COMMERCIAL

## 2018-01-29 PROBLEM — C50.411 BREAST CANCER OF UPPER-OUTER QUADRANT OF RIGHT FEMALE BREAST (HCC): Status: ACTIVE | Noted: 2018-01-29

## 2018-01-29 PROCEDURE — 77412 RADIATION TX DELIVERY LVL 3: CPT

## 2018-01-30 ENCOUNTER — APPOINTMENT (OUTPATIENT)
Dept: RADIATION THERAPY | Age: 68
End: 2018-01-30
Payer: COMMERCIAL

## 2018-01-30 ENCOUNTER — HOSPITAL ENCOUNTER (OUTPATIENT)
Dept: RADIATION THERAPY | Age: 68
Discharge: HOME | End: 2018-01-30
Payer: COMMERCIAL

## 2018-01-30 PROCEDURE — 77336 RADIATION PHYSICS CONSULT: CPT

## 2018-01-30 PROCEDURE — 77412 RADIATION TX DELIVERY LVL 3: CPT

## 2018-01-31 ENCOUNTER — HOSPITAL ENCOUNTER (OUTPATIENT)
Dept: RADIATION THERAPY | Age: 68
Discharge: HOME | End: 2018-01-31
Payer: COMMERCIAL

## 2018-01-31 ENCOUNTER — APPOINTMENT (OUTPATIENT)
Dept: RADIATION THERAPY | Age: 68
End: 2018-01-31
Payer: COMMERCIAL

## 2018-01-31 PROCEDURE — 77412 RADIATION TX DELIVERY LVL 3: CPT

## 2018-02-01 ENCOUNTER — HOSPITAL ENCOUNTER (OUTPATIENT)
Dept: RADIATION THERAPY | Age: 68
Discharge: HOME | End: 2018-02-01
Payer: COMMERCIAL

## 2018-02-01 PROCEDURE — 77334 RADIATION TREATMENT AID(S): CPT

## 2018-02-01 PROCEDURE — 77280 THER RAD SIMULAJ FIELD SMPL: CPT

## 2018-02-01 PROCEDURE — 77412 RADIATION TX DELIVERY LVL 3: CPT

## 2018-02-01 PROCEDURE — 77321 SPECIAL TELETX PORT PLAN: CPT

## 2018-02-02 ENCOUNTER — HOSPITAL ENCOUNTER (OUTPATIENT)
Dept: RADIATION THERAPY | Age: 68
Discharge: HOME | End: 2018-02-02
Payer: COMMERCIAL

## 2018-02-02 PROCEDURE — 77412 RADIATION TX DELIVERY LVL 3: CPT

## 2018-02-05 ENCOUNTER — HOSPITAL ENCOUNTER (OUTPATIENT)
Dept: BONE DENSITY | Age: 68
Discharge: HOME | End: 2018-02-05
Attending: INTERNAL MEDICINE
Payer: COMMERCIAL

## 2018-02-05 ENCOUNTER — HOSPITAL ENCOUNTER (OUTPATIENT)
Dept: RADIATION THERAPY | Age: 68
Discharge: HOME | End: 2018-02-05
Payer: COMMERCIAL

## 2018-02-05 DIAGNOSIS — Z78.0 POSTMENOPAUSAL: ICD-10-CM

## 2018-02-05 PROCEDURE — 77080 DXA BONE DENSITY AXIAL: CPT

## 2018-02-05 PROCEDURE — 77412 RADIATION TX DELIVERY LVL 3: CPT

## 2018-02-06 ENCOUNTER — HOSPITAL ENCOUNTER (OUTPATIENT)
Dept: RADIATION THERAPY | Age: 68
Discharge: HOME | End: 2018-02-06
Payer: COMMERCIAL

## 2018-02-06 PROCEDURE — 77412 RADIATION TX DELIVERY LVL 3: CPT

## 2018-02-06 PROCEDURE — 77336 RADIATION PHYSICS CONSULT: CPT

## 2018-02-20 RX ORDER — FUROSEMIDE 20 MG/1
TABLET ORAL
Qty: 60 TAB | Refills: 0 | OUTPATIENT
Start: 2018-02-20

## 2018-03-30 ENCOUNTER — HOSPITAL ENCOUNTER (OUTPATIENT)
Dept: RADIATION THERAPY | Age: 68
Discharge: HOME | End: 2018-03-30
Payer: MEDICARE

## 2018-03-30 PROCEDURE — 99211 OFF/OP EST MAY X REQ PHY/QHP: CPT

## 2018-04-09 ENCOUNTER — DOCUMENTATION ONLY (OUTPATIENT)
Dept: ONCOLOGY | Age: 68
End: 2018-04-09

## 2018-04-09 NOTE — PROGRESS NOTES
Survivorship Care Plan created for pt to review with RN @ F/U.  Scanned to [employer] and faxed to PCP

## 2018-04-11 ENCOUNTER — OFFICE VISIT (OUTPATIENT)
Dept: SURGERY | Age: 68
End: 2018-04-11

## 2018-04-11 VITALS
HEIGHT: 61 IN | BODY MASS INDEX: 34.74 KG/M2 | WEIGHT: 184 LBS | DIASTOLIC BLOOD PRESSURE: 82 MMHG | RESPIRATION RATE: 16 BRPM | HEART RATE: 85 BPM | SYSTOLIC BLOOD PRESSURE: 136 MMHG | OXYGEN SATURATION: 98 %

## 2018-04-11 DIAGNOSIS — Z85.3 PERSONAL HISTORY OF BREAST CANCER: Primary | ICD-10-CM

## 2018-04-11 NOTE — PROGRESS NOTES
Progress Note    Patient: Flower Luis  MRN: [id number]  SSN: [id number]   YOB: 1950  Age: 78 y.o. Sex: female     Chief Complaint   Patient presents with    Follow-up     3 month        HPI    Ms. [facility] is a patient of mine I did a right breast conservation operation for a T1b N0 M0 lesion. She is just now finished her radiation and has had some pulling sensations and pain laterally in the breast.  Skin looks good is nice and soft and overall she is doing well.   We will see her back in 6 months for her first posttreatment mammogram.    Past Medical History:   Diagnosis Date    Acute reaction to stress     Closed fracture of left wrist     Diastolic dysfunction     Fatty liver     GERD (gastroesophageal reflux disease)     Hyperlipidemia     Hypertension     diastolic dysfunction    Hypoglycemia     Kidney stones     11/16 for yrs; spon passage always so far    RSD (reflex sympathetic dystrophy) 8/2009    no blood pressure or sticks in left arm    Syncope 2000    no recurrence since; was orthostatic at that time    Unspecified adverse effect of anesthesia     hard to sedate     Past Surgical History:   Procedure Laterality Date    HX CARPAL TUNNEL RELEASE      HX GYN      lap. fibroid removal    HX HEART CATHETERIZATION  2005 approx    normal coronaries per pt    HX MASTECTOMY Right 11/30/2017    RIGHT BREAST LUMPECTOMY WITH NEEDLE LOCALIZATION Fara Ramirez LYMPH NODE BIOPSY  performed by Du Cruz MD at [address] [place] [address] HX ORTHOPAEDIC      ORIF left wrist    HX SHOULDER ARTHROSCOPY Left 1988     Allergies   Allergen Reactions    Lipitor [Atorvastatin] Myalgia     Quit 2/17    Nsaids (Non-Steroidal Anti-Inflammatory Drug) Other (comments)     Severe abdominal pain    Pcn [Penicillins] Swelling     Swelling, hives & photosensitive rxn     Current Outpatient Prescriptions   Medication Sig Dispense Refill    gabapentin (NEURONTIN) 300 mg capsule 1 tablet three times per day 90 Cap 0    lidocaine (LIDODERM) 5 % Apply patch to the affected area for 12 hours a day and remove for 12 hours a day. 1 Package 0    letrozole (FEMARA) 2.5 mg tablet Take 2.5 mg by mouth daily.  oxyCODONE-acetaminophen (PERCOCET) 5-325 mg per tablet Take 1 Tab by mouth every six (6) hours as needed for Pain. Max Daily Amount: 4 Tabs. 30 Tab 0    losartan (COZAAR) 100 mg tablet Take 1 Tab by mouth daily. 30 Tab 5    labetalol (NORMODYNE) 200 mg tablet TAKE ONE TABLET BY MOUTH 2 TIMES A DAY 60 Tab 03    furosemide (LASIX) 20 mg tablet Take 1 Tab by mouth daily as needed. 30 Tab 2    albuterol (PROVENTIL HFA, VENTOLIN HFA, PROAIR HFA) 90 mcg/actuation inhaler Take 1 Puff by inhalation every four (4) hours as needed for Wheezing. 1 Inhaler 5    FLAXSEED OIL (OMEGA 3 PO) Take  by mouth.  Dexlansoprazole (DEXILANT) 60 mg CpDB Take  by mouth Daily (before breakfast).  cycloSPORINE (RESTASIS) 0.05 % ophthalmic emulsion Administer 1 Drop to both eyes two (2) times a day.  LORazepam (ATIVAN) 0.5 mg tablet Take 1 Tab by mouth two (2) times a day. Max Daily Amount: 1 mg. (Patient taking differently: Take 0.5 mg by mouth as needed.) 60 Tab 3    aluminum hydrox-magnesium carb (GAVISCON EXTRA STRENGTH) 160-105 mg chew Take 2 Tabs by mouth as needed.  acetaminophen (TYLENOL EXTRA STRENGTH) 500 mg tablet Take 1,000 mg by mouth every six (6) hours as needed. Social History     Social History    Marital status:      Spouse name: N/A    Number of children: N/A    Years of education: N/A     Occupational History    Not on file.      Social History Main Topics    Smoking status: Former Smoker     Packs/day: 0.50     Years: 11.00     Quit date: 1/1/2003    Smokeless tobacco: Never Used    Alcohol use 0.6 oz/week     1 Glasses of wine per week      Comment: rarely- 3-4 drinks/yr    Drug use: No    Sexual activity: Not Currently     Other Topics Concern    Not on file     Social History Narrative     Family History   Problem Relation Age of Onset    Diabetes Maternal Aunt     Cancer Maternal Uncle      throat    Cancer Maternal Grandmother      melanoma    Heart Disease Other     Heart Attack Paternal Grandmother 48    Cancer Maternal Grandfather      Melanoma    Stroke Neg Hx          Review of systems:  Patient denies any reflux, emesis, abdominal pain, change in bowel habits, hematochezia, melena, fever, weight loss, fatigue chills, dermatitis, abnormal moles, change in vision, vertigo, epistaxis, dysphagia, hoarseness, chest pain, palpitations, hypertension, edema, cough, shortness of breath, wheezing, hemoptysis, snoring, hematuria, diabetes, thyroid disease, anemia, bruising, history of blood transfusion, dizziness, headache, or fainting. Physical Examination    Well developed well nourished female in no apparent distress  Visit Vitals    /82    Pulse 85    Resp 16    Ht 5' 1\" (1.549 m)    Wt 83.5 kg (184 lb)    SpO2 98%    BMI 34.77 kg/m2      Head: normocephalic, atraumatic  Mouth: Clear, no overt lesions, oral mucosa pink and moist  Neck: supple, no masses, no adenopathy or carotid bruits, trachea midline  Resp: clear to auscultation bilaterally, no wheeze, rhonchi or rales, excursions normal and symmetrical  Cardio: Regular rate and rhythm, no murmurs, clicks, gallops or rubs, no edema or varicosities  Abdomen: soft, nontender, nondistended, normoactive bowel sounds, no hernias, no hepatosplenomegaly,   Back: Deferred  Extremeties: warm, well-perfused, no tenderness or swelling, normal gait/station  Neuro: sensation and strength grossly intact and symmetrical  Psych: alert and oriented to person, place and time  Breast exam right breast exam without dominant mass or skin change. Well-healed wounds. Tender in the right axilla    IMPRESSION  Status post surgery for an early stage right breast cancer.     PLAN  No orders of the defined types were placed in this encounter.     Follow-up in 6 months with mammogram  Radha Roa MD

## 2018-04-11 NOTE — MR AVS SNAPSHOT
[address] [facility] 
 
 
 [address] [facility] [address] 
[phone] Patient: Amy Leija MRN: [id number] Visit Information Date & Time Provider Department Dept. Phone Encounter #  
 4/11/2018  9:00 AM Michelle Bowles  [facility] [id number] Your Appointments 8/6/2018  9:30 AM  
Follow Up with Michelle Bowles MD  
[address] [place] [facility]: f/u after mammo [address] [facility] [address]  
[phone]  
  
   
 [address] [facility] [address] Upcoming Health Maintenance Date Due Hepatitis C Screening 1950 DTaP/Tdap/Td series (1 - Tdap) 8/25/1972 ZOSTER VACCINE AGE 60> 6/25/2011 GLAUCOMA SCREENING Q2Y 8/25/2016 Pneumococcal 65+ High/Highest Risk (1 of 2 - PCV13) 8/25/2016 Influenza Age 5 to Adult 8/1/2017 MEDICARE YEARLY EXAM 3/28/2018 BREAST CANCER SCRN MAMMOGRAM 10/24/2019 COLONOSCOPY 10/17/2022 Allergies as of 4/11/2018  Review Complete On: 4/11/2018 By: Parisa Rosa LPN Severity Noted Reaction Type Reactions Lipitor [Atorvastatin]  03/01/2017    Myalgia Quit 2/17 Nsaids (Non-steroidal Anti-inflammatory Drug)  10/17/2012    Other (comments) Severe abdominal pain  
 Pcn [Penicillins]  10/17/2012    Swelling Swelling, hives & photosensitive rxn Current Immunizations  Reviewed on 10/28/2016 Name Date Influenza Vaccine 9/19/2016 Poliovirus vaccine 5/14/1998 Not reviewed this visit Vitals BP Pulse Resp Height(growth percentile) Weight(growth percentile) SpO2  
 136/82 85 16 5' 1\" (1.549 m) 184 lb (83.5 kg) 98% BMI OB Status Smoking Status 34.77 kg/m2 Postmenopausal Former Smoker Vitals History BMI and BSA Data Body Mass Index Body Surface Area  34.77 kg/m 2 1.9 m 2  
  
  
 Preferred Pharmacy Pharmacy Name Phone [phone] [facility] [place]  [phone] Your Updated Medication List  
  
   
This list is accurate as of 4/11/18 10:00 AM.  Always use your most recent med list.  
  
  
  
  
 albuterol 90 mcg/actuation inhaler Commonly known as:  PROVENTIL HFA, VENTOLIN HFA, PROAIR HFA Take 1 Puff by inhalation every four (4) hours as needed for Wheezing. DEXILANT 60 mg Cpdb Generic drug:  Dexlansoprazole Take  by mouth Daily (before breakfast). FEMARA 2.5 mg tablet Generic drug:  letrozole Take 2.5 mg by mouth daily. furosemide 20 mg tablet Commonly known as:  LASIX Take 1 Tab by mouth daily as needed. gabapentin 300 mg capsule Commonly known as:  NEURONTIN  
1 tablet three times per day GAVISCON EXTRA STRENGTH 160-105 mg Yamini Oxford Generic drug:  aluminum hydrox-magnesium carb Take 2 Tabs by mouth as needed. labetalol 200 mg tablet Commonly known as:  NORMODYNE  
TAKE ONE TABLET BY MOUTH 2 TIMES A DAY  
  
 lidocaine 5 % Commonly known as:  Leo Kitchen Apply patch to the affected area for 12 hours a day and remove for 12 hours a day. LORazepam 0.5 mg tablet Commonly known as:  ATIVAN Take 1 Tab by mouth two (2) times a day. Max Daily Amount: 1 mg.  
  
 losartan 100 mg tablet Commonly known as:  COZAAR Take 1 Tab by mouth daily. OMEGA 3 PO Take  by mouth. oxyCODONE-acetaminophen 5-325 mg per tablet Commonly known as:  PERCOCET Take 1 Tab by mouth every six (6) hours as needed for Pain. Max Daily Amount: 4 Tabs. RESTASIS 0.05 % ophthalmic emulsion Generic drug:  cycloSPORINE Administer 1 Drop to both eyes two (2) times a day. TYLENOL EXTRA STRENGTH 500 mg tablet Generic drug:  acetaminophen Take 1,000 mg by mouth every six (6) hours as needed. Introducing [facility]! Dear Nitin Gomez: Thank you for requesting a [employer] account. Our records indicate that you already have an active [employer] account. You can access your account anytime at [email or web address] Did you know that you can access your hospital and ER discharge instructions at any time in [employer]? You can also review all of your test results from your hospital stay or ER visit. Additional Information If you have questions, please visit the Frequently Asked Questions section of the [employer] website at [email or web address] Remember, [employer] is NOT to be used for urgent needs. For medical emergencies, dial 911. Now available from your iPhone and Android! Please provide this summary of care documentation to your next provider. Your primary care clinician is listed as Dee Dee Pike. If you have any questions after today's visit, please call [phone].

## 2018-04-12 RX ORDER — LABETALOL 200 MG/1
TABLET, FILM COATED ORAL
Qty: 60 TAB | Refills: 3 | Status: SHIPPED | OUTPATIENT
Start: 2018-04-12 | End: 2018-07-06 | Stop reason: SDUPTHER

## 2018-04-23 ENCOUNTER — DOCUMENTATION ONLY (OUTPATIENT)
Dept: ONCOLOGY | Age: 68
End: 2018-04-23

## 2018-04-25 ENCOUNTER — DOCUMENTATION ONLY (OUTPATIENT)
Dept: ONCOLOGY | Age: 68
End: 2018-04-25

## 2018-05-07 ENCOUNTER — DOCUMENTATION ONLY (OUTPATIENT)
Dept: ONCOLOGY | Age: 68
End: 2018-05-07

## 2018-05-07 NOTE — PROGRESS NOTES
Pt returned request for call, Survivorship Care Plan reviewed. All questions answered, pt verbalized understanding. Pt reminded of Breast Support Group and encouraged to attend. Pt also encouraged to call back if he has any further questions. Pt states she was not put on Herceptin per Dr. Sai Kaba, d/t the small size of tumor. Corrected SCP and re scanned to chart  Pt states she has been having a pain under her R axilla and decreased ROM d/t this pain. Pt encouraged to call Dr. Russo Peak office and make him aware as her next f/u isn't until august 2018. Pt states she will do so. Pt thanked nurse.

## 2018-07-06 ENCOUNTER — OFFICE VISIT (OUTPATIENT)
Dept: CARDIOLOGY CLINIC | Age: 68
End: 2018-07-06

## 2018-07-06 VITALS
HEIGHT: 61 IN | WEIGHT: 183 LBS | HEART RATE: 79 BPM | SYSTOLIC BLOOD PRESSURE: 107 MMHG | DIASTOLIC BLOOD PRESSURE: 68 MMHG | BODY MASS INDEX: 34.55 KG/M2

## 2018-07-06 DIAGNOSIS — I10 ESSENTIAL HYPERTENSION: ICD-10-CM

## 2018-07-06 DIAGNOSIS — E66.9 OBESITY (BMI 30.0-34.9): ICD-10-CM

## 2018-07-06 DIAGNOSIS — I50.32 CHRONIC DIASTOLIC CONGESTIVE HEART FAILURE (HCC): Primary | ICD-10-CM

## 2018-07-06 DIAGNOSIS — E78.00 PURE HYPERCHOLESTEROLEMIA: ICD-10-CM

## 2018-07-06 RX ORDER — LABETALOL 200 MG/1
TABLET, FILM COATED ORAL
Qty: 180 TAB | Refills: 2 | Status: SHIPPED | OUTPATIENT
Start: 2018-07-06 | End: 2019-04-16 | Stop reason: SDUPTHER

## 2018-07-06 RX ORDER — LOSARTAN POTASSIUM 100 MG/1
100 TABLET ORAL DAILY
Qty: 90 TAB | Refills: 2 | Status: SHIPPED | OUTPATIENT
Start: 2018-07-06 | End: 2019-04-17 | Stop reason: SDUPTHER

## 2018-07-06 RX ORDER — FUROSEMIDE 20 MG/1
20 TABLET ORAL
Qty: 30 TAB | Refills: 2 | Status: SHIPPED | OUTPATIENT
Start: 2018-07-06 | End: 2018-10-07 | Stop reason: SDUPTHER

## 2018-07-06 NOTE — PATIENT INSTRUCTIONS
Medications Discontinued During This Encounter   Medication Reason    gabapentin (NEURONTIN) 300 mg capsule Therapy Completed    lidocaine (LIDODERM) 5 % Therapy Completed    LORazepam (ATIVAN) 0.5 mg tablet Therapy Completed    oxyCODONE-acetaminophen (PERCOCET) 5-325 mg per tablet Therapy Completed    labetalol (NORMODYNE) 200 mg tablet Reorder    losartan (COZAAR) 100 mg tablet Reorder    furosemide (LASIX) 20 mg tablet Reorder          Body Mass Index: Care Instructions  Your Care Instructions    Body mass index (BMI) can help you see if your weight is raising your risk for health problems. It uses a formula to compare how much you weigh with how tall you are. · A BMI lower than 18.5 is considered underweight. · A BMI between 18.5 and 24.9 is considered healthy. · A BMI between 25 and 29.9 is considered overweight. A BMI of 30 or higher is considered obese. If your BMI is in the normal range, it means that you have a lower risk for weight-related health problems. If your BMI is in the overweight or obese range, you may be at increased risk for weight-related health problems, such as high blood pressure, heart disease, stroke, arthritis or joint pain, and diabetes. If your BMI is in the underweight range, you may be at increased risk for health problems such as fatigue, lower protection (immunity) against illness, muscle loss, bone loss, hair loss, and hormone problems. BMI is just one measure of your risk for weight-related health problems. You may be at higher risk for health problems if you are not active, you eat an unhealthy diet, or you drink too much alcohol or use tobacco products. Follow-up care is a key part of your treatment and safety. Be sure to make and go to all appointments, and call your doctor if you are having problems. It's also a good idea to know your test results and keep a list of the medicines you take. How can you care for yourself at home?   · Practice healthy eating habits. This includes eating plenty of fruits, vegetables, whole grains, lean protein, and low-fat dairy. · If your doctor recommends it, get more exercise. Walking is a good choice. Bit by bit, increase the amount you walk every day. Try for at least 30 minutes on most days of the week. · Do not smoke. Smoking can increase your risk for health problems. If you need help quitting, talk to your doctor about stop-smoking programs and medicines. These can increase your chances of quitting for good. · Limit alcohol to 2 drinks a day for men and 1 drink a day for women. Too much alcohol can cause health problems. If you have a BMI higher than 25  · Your doctor may do other tests to check your risk for weight-related health problems. This may include measuring the distance around your waist. A waist measurement of more than 40 inches in men or 35 inches in women can increase the risk of weight-related health problems. · Talk with your doctor about steps you can take to stay healthy or improve your health. You may need to make lifestyle changes to lose weight and stay healthy, such as changing your diet and getting regular exercise. If you have a BMI lower than 18.5  · Your doctor may do other tests to check your risk for health problems. · Talk with your doctor about steps you can take to stay healthy or improve your health. You may need to make lifestyle changes to gain or maintain weight and stay healthy, such as getting more healthy foods in your diet and doing exercises to build muscle. Where can you learn more? Go to http://christina-jose de jesus.info/. Enter S176 in the search box to learn more about \"Body Mass Index: Care Instructions. \"  Current as of: October 13, 2016  Content Version: 11.4  © 4188-6911 [employer]. Care instructions adapted under license by [employer] (which disclaims liability or warranty for this information).  If you have questions about a medical condition or this instruction, always ask your healthcare professional. [place] [address] any warranty or liability for your use of this information.

## 2018-07-06 NOTE — MR AVS SNAPSHOT
[address] [facility] [address] 
 
 
 [address] [facility], [address] [facility] [address] 
[phone] Patient: Jasmeet Pendleton MRN: [id number] NLZ:[id number] Visit Information Date & Time Provider Department Dept. Phone Encounter #  
 7/6/2018  9:00 AM Nichole Maldonado MD [facility] [address] [place] [address] [id number] Follow-up Instructions Return in about 6 months (around 1/6/2019), or if symptoms worsen or fail to improve. Your Appointments 8/6/2018  9:30 AM  
Follow Up with Ileana Ferris MD  
[address] [facility]) Appt Note: f/u after mammo [address] [facility] [address]  
[phone]  
  
   
 [address] [address]  
  
    
 1/4/2019  9:00 AM  
Office Visit with Nichole Maldonado MD  
[facility]) Appt Note: 6 months post labs [address] [facility], [address] [facility] [address]  
[address], [address] [facility] [address] Upcoming Health Maintenance Date Due Hepatitis C Screening 1950 DTaP/Tdap/Td series (1 - Tdap) 8/25/1972 ZOSTER VACCINE AGE 60> 6/25/2011 GLAUCOMA SCREENING Q2Y 8/25/2016 Pneumococcal 65+ High/Highest Risk (1 of 2 - PCV13) 8/25/2016 MEDICARE YEARLY EXAM 3/28/2018 Influenza Age 5 to Adult 8/1/2018 BREAST CANCER SCRN MAMMOGRAM 10/24/2019 COLONOSCOPY 10/17/2022 Allergies as of 7/6/2018  Review Complete On: 7/6/2018 By: Nichole Maldonado MD  
  
 Severity Noted Reaction Type Reactions Lipitor [Atorvastatin]  03/01/2017    Myalgia Quit 2/17 Nsaids (Non-steroidal Anti-inflammatory Drug)  10/17/2012    Other (comments) Severe abdominal pain  
 Pcn [Penicillins]  10/17/2012    Swelling Swelling, hives & photosensitive rxn Current Immunizations  Reviewed on 10/28/2016 Name Date Influenza Vaccine 9/19/2016 Poliovirus vaccine 5/14/1998 Not reviewed this visit You Were Diagnosed With   
  
 Codes Comments Chronic diastolic congestive heart failure (HCC)    -  Primary ICD-10-CM: I50.32 
ICD-9-CM: 428.32, 428.0 Pure hypercholesterolemia     ICD-10-CM: E78.00 ICD-9-CM: 272.0 Essential hypertension     ICD-10-CM: I10 
ICD-9-CM: 401.9 Obesity (BMI 30.0-34.9)     ICD-10-CM: G69.8 ICD-9-CM: 278.00 Vitals BP Pulse Height(growth percentile) Weight(growth percentile) BMI OB Status 107/68 79 5' 1\" (1.549 m) 183 lb (83 kg) 34.58 kg/m2 Postmenopausal  
 Smoking Status Former Smoker Vitals History BMI and BSA Data Body Mass Index Body Surface Area 34.58 kg/m 2 1.89 m 2 Preferred Pharmacy Pharmacy Name Phone [facility] [address] [place] [phone] Your Updated Medication List  
  
   
This list is accurate as of 7/6/18 10:05 AM.  Always use your most recent med list.  
  
  
  
  
 albuterol 90 mcg/actuation inhaler Commonly known as:  PROVENTIL HFA, VENTOLIN HFA, PROAIR HFA Take 1 Puff by inhalation every four (4) hours as needed for Wheezing. DEXILANT 60 mg Cpdb Generic drug:  Dexlansoprazole Take  by mouth Daily (before breakfast). FEMARA 2.5 mg tablet Generic drug:  letrozole Take 2.5 mg by mouth daily. furosemide 20 mg tablet Commonly known as:  LASIX Take 1 Tab by mouth daily as needed. GAVISCON EXTRA STRENGTH 160-105 mg Jeppie Nageotte Generic drug:  aluminum hydrox-magnesium carb Take 2 Tabs by mouth as needed. labetalol 200 mg tablet Commonly known as:  NORMODYNE  
TAKE ONE TABLET BY MOUTH 2 TIMES A DAY  
  
 losartan 100 mg tablet Commonly known as:  COZAAR Take 1 Tab by mouth daily. OMEGA 3 PO Take  by mouth. RESTASIS 0.05 % ophthalmic emulsion Generic drug:  cycloSPORINE  
 Administer 1 Drop to both eyes two (2) times a day. TYLENOL EXTRA STRENGTH 500 mg tablet Generic drug:  acetaminophen Take 1,000 mg by mouth every six (6) hours as needed. Prescriptions Sent to Pharmacy Refills  
 labetalol (NORMODYNE) 200 mg tablet 2 Sig: TAKE ONE TABLET BY MOUTH 2 TIMES A DAY Class: Normal  
 Pharmacy: [address] Ph #: [phone]  
 losartan (COZAAR) 100 mg tablet 2 Sig: Take 1 Tab by mouth daily. Class: Normal  
 Pharmacy: [address] Ph #: [phone] Route: Oral  
 furosemide (LASIX) 20 mg tablet 2 Sig: Take 1 Tab by mouth daily as needed. Class: Normal  
 Pharmacy: [address] Ph #: [phone] Route: Oral  
  
Follow-up Instructions Return in about 6 months (around 1/6/2019), or if symptoms worsen or fail to improve. To-Do List   
 Around 07/06/2018 Lab:  HEPATIC FUNCTION PANEL Around 07/06/2018 Lab:  LIPID PANEL   
  
 08/01/2018 10:00 AM  
  Appointment with JOSÉ MIGUEL MATHEWS 2 at [address] [place] [address] ([phone]) OUTSIDE FILMS  - Any outside films related to the study being scheduled should be brought with you on the day of the exam.  If this cannot be done there may be a delay in the reading of the study. MEDICATIONS  - Patient must bring a complete list of all medications currently taking to include prescriptions, over-the-counter meds, herbals, vitamins & any dietary supplements  GENERAL INSTRUCTIONS  - On the day of your exam do not use any bath powder, deodorant or lotions on the armpit area. Patient Instructions Medications Discontinued During This Encounter Medication Reason  gabapentin (NEURONTIN) 300 mg capsule Therapy Completed  lidocaine (LIDODERM) 5 % Therapy Completed  LORazepam (ATIVAN) 0.5 mg tablet Therapy Completed  oxyCODONE-acetaminophen (PERCOCET) 5-325 mg per tablet Therapy Completed  labetalol (NORMODYNE) 200 mg tablet Reorder  losartan (COZAAR) 100 mg tablet Reorder  furosemide (LASIX) 20 mg tablet Reorder Body Mass Index: Care Instructions Your Care Instructions Body mass index (BMI) can help you see if your weight is raising your risk for health problems. It uses a formula to compare how much you weigh with how tall you are. · A BMI lower than 18.5 is considered underweight. · A BMI between 18.5 and 24.9 is considered healthy. · A BMI between 25 and 29.9 is considered overweight. A BMI of 30 or higher is considered obese. If your BMI is in the normal range, it means that you have a lower risk for weight-related health problems. If your BMI is in the overweight or obese range, you may be at increased risk for weight-related health problems, such as high blood pressure, heart disease, stroke, arthritis or joint pain, and diabetes. If your BMI is in the underweight range, you may be at increased risk for health problems such as fatigue, lower protection (immunity) against illness, muscle loss, bone loss, hair loss, and hormone problems. BMI is just one measure of your risk for weight-related health problems. You may be at higher risk for health problems if you are not active, you eat an unhealthy diet, or you drink too much alcohol or use tobacco products. Follow-up care is a key part of your treatment and safety. Be sure to make and go to all appointments, and call your doctor if you are having problems. It's also a good idea to know your test results and keep a list of the medicines you take. How can you care for yourself at home? · Practice healthy eating habits.  This includes eating plenty of fruits, vegetables, whole grains, lean protein, and low-fat dairy. · If your doctor recommends it, get more exercise. Walking is a good choice. Bit by bit, increase the amount you walk every day. Try for at least 30 minutes on most days of the week. · Do not smoke. Smoking can increase your risk for health problems. If you need help quitting, talk to your doctor about stop-smoking programs and medicines. These can increase your chances of quitting for good. · Limit alcohol to 2 drinks a day for men and 1 drink a day for women. Too much alcohol can cause health problems. If you have a BMI higher than 25 · Your doctor may do other tests to check your risk for weight-related health problems. This may include measuring the distance around your waist. A waist measurement of more than 40 inches in men or 35 inches in women can increase the risk of weight-related health problems. · Talk with your doctor about steps you can take to stay healthy or improve your health. You may need to make lifestyle changes to lose weight and stay healthy, such as changing your diet and getting regular exercise. If you have a BMI lower than 18.5 · Your doctor may do other tests to check your risk for health problems. · Talk with your doctor about steps you can take to stay healthy or improve your health. You may need to make lifestyle changes to gain or maintain weight and stay healthy, such as getting more healthy foods in your diet and doing exercises to build muscle. Where can you learn more? Go to http://christina-jose de jesus.info/. Enter S176 in the search box to learn more about \"Body Mass Index: Care Instructions. \" Current as of: October 13, 2016 Content Version: 11.4 © 4115-9004 Healthwise, Incorporated. Care instructions adapted under license by [employer] (which disclaims liability or warranty for this information).  If you have questions about a medical condition or this instruction, always ask your healthcare professional. [address] any warranty or liability for your use of this information. Introducing [facility]! Dear Yayo Mosqueda: Thank you for requesting a [employer]al account. Our records indicate that you already have an active [employer]autal account. You can access your account anytime at [email or web address]. [employer] Did you know that you can access your hospital and ER discharge instructions at any time in [employer]autal? You can also review all of your test results from your hospital stay or ER visit. Additional Information If you have questions, please visit the Frequently Asked Questions section of the [employer] website at [email or web address] [employer]/. Remember, [employer] is NOT to be used for urgent needs. For medical emergencies, dial 911. Now available from your iPhone and Android! Please provide this summary of care documentation to your next provider. Your primary care clinician is listed as Zigmund Speaker. If you have any questions after today's visit, please call [phone].

## 2018-07-06 NOTE — PROGRESS NOTES
Hdietary management education, guidance, and counselingistory of present ILLNESS  Jasmeet Pendleton is a 78 y.o. female. HPI Comments: 11/16 seen for abn EKG, h/o 45-50%EF; had jaw pain and left neck discomfort- worse with bending over  10/16 had SOB/edema- improved now. Had high BP which is being treated now. Was drinking extra fluids due to renal stone which has now been passed and will not need lithotripsy now        CHF   The history is provided by the medical records. This is a chronic problem. Associated symptoms include shortness of breath. Pertinent negatives include no chest pain and no headaches. Hypertension   The history is provided by the patient. This is a new problem. The current episode started more than 1 week ago (treatment started 10/16 again after some yrs). Associated symptoms include shortness of breath. Pertinent negatives include no chest pain and no headaches. Cholesterol Problem   The history is provided by the medical records. This is a chronic problem. Associated symptoms include shortness of breath. Pertinent negatives include no chest pain and no headaches. Leg Swelling   The history is provided by the patient. This is a chronic problem. The current episode started more than 1 week ago. The problem occurs rarely (once in 3-4 months). Associated symptoms include shortness of breath. Pertinent negatives include no chest pain and no headaches. The symptoms are aggravated by standing. The symptoms are relieved by sleep. Shortness of Breath   The history is provided by the patient. This is a new problem. The problem occurs rarely (3 times/yr). The current episode started more than 1 week ago (yrs). The problem has been gradually improving (since radiation and femara stopped). Pertinent negatives include no fever, no headaches, no cough, no wheezing, no PND, no orthopnea, no chest pain, no vomiting, no rash, no leg swelling and no claudication.  The problem's precipitants include exercise (walking across parking lot; 11/16 improving; 3/17 2flights but can walk >2 miles; 7/18 around the block). Review of Systems   Constitutional: Negative for chills, fever, malaise/fatigue and weight loss. HENT: Negative for nosebleeds. Eyes: Negative for discharge. Respiratory: Positive for shortness of breath. Negative for cough and wheezing. Cardiovascular: Negative for chest pain, palpitations, orthopnea, claudication, leg swelling and PND. Gastrointestinal: Negative for diarrhea, nausea and vomiting. Genitourinary: Negative for dysuria and hematuria. Musculoskeletal: Negative for joint pain. Skin: Negative for rash. Neurological: Negative for dizziness, seizures, loss of consciousness and headaches. Endo/Heme/Allergies: Negative for polydipsia. Does not bruise/bleed easily. Psychiatric/Behavioral: Negative for depression and substance abuse. The patient does not have insomnia.       Allergies   Allergen Reactions    Lipitor [Atorvastatin] Myalgia     Quit 2/17    Nsaids (Non-Steroidal Anti-Inflammatory Drug) Other (comments)     Severe abdominal pain    Pcn [Penicillins] Swelling     Swelling, hives & photosensitive rxn       Past Medical History:   Diagnosis Date    Acute reaction to stress     Closed fracture of left wrist     Diastolic dysfunction     Fatty liver     GERD (gastroesophageal reflux disease)     Hyperlipidemia     Hypertension     diastolic dysfunction    Hypoglycemia     Kidney stones     11/16 for yrs; spon passage always so far    RSD (reflex sympathetic dystrophy) 8/2009    no blood pressure or sticks in left arm    Syncope 2000    no recurrence since; was orthostatic at that time    Unspecified adverse effect of anesthesia     hard to sedate       Family History   Problem Relation Age of Onset    Diabetes Maternal Aunt     Cancer Maternal Uncle      throat    Cancer Maternal Grandmother      melanoma    Heart Disease Other     Heart Attack Paternal Grandmother 48    Cancer Maternal Grandfather      Melanoma    Stroke Neg Hx        Social History   Substance Use Topics    Smoking status: Former Smoker     Packs/day: 0.50     Years: 11.00     Quit date: 1/1/2003    Smokeless tobacco: Never Used    Alcohol use 0.6 oz/week     1 Glasses of wine per week      Comment: rarely- 3-4 drinks/yr        Current Outpatient Prescriptions   Medication Sig    losartan (COZAAR) 100 mg tablet TAKE 1 TABLET BY MOUTH EVERY DAY    labetalol (NORMODYNE) 200 mg tablet TAKE ONE TABLET BY MOUTH 2 TIMES A DAY    letrozole (FEMARA) 2.5 mg tablet Take 2.5 mg by mouth daily.  furosemide (LASIX) 20 mg tablet Take 1 Tab by mouth daily as needed.  albuterol (PROVENTIL HFA, VENTOLIN HFA, PROAIR HFA) 90 mcg/actuation inhaler Take 1 Puff by inhalation every four (4) hours as needed for Wheezing.  FLAXSEED OIL (OMEGA 3 PO) Take  by mouth.  Dexlansoprazole (DEXILANT) 60 mg CpDB Take  by mouth Daily (before breakfast).  cycloSPORINE (RESTASIS) 0.05 % ophthalmic emulsion Administer 1 Drop to both eyes two (2) times a day.  aluminum hydrox-magnesium carb (GAVISCON EXTRA STRENGTH) 160-105 mg chew Take 2 Tabs by mouth as needed.  acetaminophen (TYLENOL EXTRA STRENGTH) 500 mg tablet Take 1,000 mg by mouth every six (6) hours as needed. No current facility-administered medications for this visit. Past Surgical History:   Procedure Laterality Date    HX CARPAL TUNNEL RELEASE      HX GYN      lap. fibroid removal    HX HEART CATHETERIZATION  2005 approx    normal coronaries per pt    HX MASTECTOMY Right 11/30/2017    RIGHT BREAST LUMPECTOMY WITH NEEDLE LOCALIZATION [facility]ced[facility] LYMPH NODE BIOPSY  performed by Makenna Carrero MD at [address] [place] [address] HX ORTHOPAEDIC      ORIF left wrist    HX SHOULDER ARTHROSCOPY Left 1988       Diagnostic Studies: Old records reviewed and show as follows  EKG tracings reviewed by me today. No flowsheet data found. 11/16 Nuc Stress  Conclusion:    1. Normal perfusion scan.    2. Normal wall motion and ejection fraction.    3. Low risk scan. Visit Vitals    /68    Pulse 79    Ht 5' 1\" (1.549 m)    Wt 183 lb (83 kg)    BMI 34.58 kg/m2       Ms. Christen Hernandez has a reminder for a \"due or due soon\" health maintenance. I have asked that she contact her primary care provider for follow-up on this health maintenance. Physical Exam   Constitutional: She is oriented to person, place, and time. She appears well-developed and well-nourished. No distress. obese   HENT:   Head: Normocephalic and atraumatic. Mouth/Throat: Normal dentition. Eyes: Right eye exhibits no discharge. Left eye exhibits no discharge. No scleral icterus. Neck: Neck supple. No JVD present. Carotid bruit is not present. No thyromegaly present. Cardiovascular: Normal rate, regular rhythm, S1 normal, S2 normal, normal heart sounds and intact distal pulses. Exam reveals no gallop and no friction rub. No murmur heard. Pulmonary/Chest: Effort normal and breath sounds normal. She has no wheezes. She has no rales. Abdominal: Soft. She exhibits no mass. There is no tenderness. Musculoskeletal: She exhibits no edema. Lymphadenopathy:        Right cervical: No superficial cervical adenopathy present. Left cervical: No superficial cervical adenopathy present. Neurological: She is alert and oriented to person, place, and time. Skin: Skin is warm and dry. No rash noted. Psychiatric: She has a normal mood and affect. Her behavior is normal.       ASSESSMENT and PLAN    I have reviewed/discussed the above normal BMI with the patient. I have recommended the following interventions: dietary management education, guidance, and counseling . . QVHE2  Will try statin again twice a week- will call us which statin she has at home  HTN controlled        Diagnoses and all orders for this visit:    1.  Chronic diastolic congestive heart failure ([address]  -     furosemide (LASIX) 20 mg tablet; Take 1 Tab by mouth daily as needed. 2. Pure hypercholesterolemia  -     LIPID PANEL; Future  -     HEPATIC FUNCTION PANEL; Future    3. Essential hypertension  -     labetalol (NORMODYNE) 200 mg tablet; TAKE ONE TABLET BY MOUTH 2 TIMES A DAY  -     losartan (COZAAR) 100 mg tablet; Take 1 Tab by mouth daily. 4. Obesity (BMI 30.0-34. 9)        Pertinent laboratory and test data reviewed and discussed with patient. See patient instructions also for other medical advice given    Medications Discontinued During This Encounter   Medication Reason    gabapentin (NEURONTIN) 300 mg capsule Therapy Completed    lidocaine (LIDODERM) 5 % Therapy Completed    LORazepam (ATIVAN) 0.5 mg tablet Therapy Completed    oxyCODONE-acetaminophen (PERCOCET) 5-325 mg per tablet Therapy Completed    labetalol (NORMODYNE) 200 mg tablet Reorder    losartan (COZAAR) 100 mg tablet Reorder    furosemide (LASIX) 20 mg tablet Reorder       Follow-up Disposition:  Return in about 6 months (around 1/6/2019), or if symptoms worsen or fail to improve.

## 2018-07-06 NOTE — PROGRESS NOTES
1. Have you been to the ER, urgent care clinic since your last visit? Hospitalized since your last visit? No    2. Have you seen or consulted any other health care providers outside of the [address] [place] [address] since your last visit? Include any pap smears or colon screening. Yes Where: PCP Routine/ Oncology Breast Cancer  GI Routine    3. Since your last visit, have you had any of the following symptoms? shortness of breath and swelling in legs/arms. 4.  Have you had any blood work, X-rays or cardiac testing? No    5. Where do you normally have your labs drawn? [address]    6. Do you need any refills today?    No

## 2018-07-06 NOTE — LETTER
Yancy Shelton 1950 7/6/2018 Dear Juancho Pop MD 
 
I had the pleasure of evaluating  Ms. Michail Litten in office today. Below are the relevant portions of my assessment and plan of care. ICD-10-CM ICD-9-CM 1. Chronic diastolic congestive heart failure (HCC) I50.32 428.32 furosemide (LASIX) 20 mg tablet 428.0 2. Pure hypercholesterolemia E78.00 272.0 LIPID PANEL  
   HEPATIC FUNCTION PANEL 3. Essential hypertension I10 401.9 labetalol (NORMODYNE) 200 mg tablet  
   losartan (COZAAR) 100 mg tablet 4. Obesity (BMI 30.0-34. 9) E66.9 278.00 Current Outpatient Prescriptions Medication Sig Dispense Refill  labetalol (NORMODYNE) 200 mg tablet TAKE ONE TABLET BY MOUTH 2 TIMES A  Tab 2  
 losartan (COZAAR) 100 mg tablet Take 1 Tab by mouth daily. 90 Tab 2  
 furosemide (LASIX) 20 mg tablet Take 1 Tab by mouth daily as needed. 30 Tab 2  
 letrozole (FEMARA) 2.5 mg tablet Take 2.5 mg by mouth daily.  albuterol (PROVENTIL HFA, VENTOLIN HFA, PROAIR HFA) 90 mcg/actuation inhaler Take 1 Puff by inhalation every four (4) hours as needed for Wheezing. 1 Inhaler 5  FLAXSEED OIL (OMEGA 3 PO) Take  by mouth.  Dexlansoprazole (DEXILANT) 60 mg CpDB Take  by mouth Daily (before breakfast).  cycloSPORINE (RESTASIS) 0.05 % ophthalmic emulsion Administer 1 Drop to both eyes two (2) times a day.  aluminum hydrox-magnesium carb (GAVISCON EXTRA STRENGTH) 160-105 mg chew Take 2 Tabs by mouth as needed.  acetaminophen (TYLENOL EXTRA STRENGTH) 500 mg tablet Take 1,000 mg by mouth every six (6) hours as needed. Orders Placed This Encounter  LIPID PANEL Standing Status:   Future Standing Expiration Date:   10/6/2018  
 HEPATIC FUNCTION PANEL Standing Status:   Future Standing Expiration Date:   10/6/2018  labetalol (NORMODYNE) 200 mg tablet Sig: TAKE ONE TABLET BY MOUTH 2 TIMES A DAY Dispense:  180 Tab Refill:  2  losartan (COZAAR) 100 mg tablet Sig: Take 1 Tab by mouth daily. Dispense:  90 Tab Refill:  2  
 furosemide (LASIX) 20 mg tablet Sig: Take 1 Tab by mouth daily as needed. Dispense:  30 Tab Refill:  2 If you have questions, please do not hesitate to call me. I look forward to following Ms. Jacob Paris along with you. Sincerely, Diane Frost MD

## 2018-08-01 ENCOUNTER — HOSPITAL ENCOUNTER (OUTPATIENT)
Dept: MAMMOGRAPHY | Age: 68
Discharge: HOME | End: 2018-08-01
Payer: MEDICARE

## 2018-08-01 DIAGNOSIS — Z85.3 PERSONAL HISTORY OF BREAST CANCER: ICD-10-CM

## 2018-08-01 PROCEDURE — 77066 DX MAMMO INCL CAD BI: CPT

## 2018-08-08 ENCOUNTER — OFFICE VISIT (OUTPATIENT)
Dept: SURGERY | Age: 68
End: 2018-08-08

## 2018-08-08 DIAGNOSIS — Z85.3 PERSONAL HISTORY OF BREAST CANCER: Primary | ICD-10-CM

## 2018-08-08 NOTE — MR AVS SNAPSHOT
[address] [facility] [address] 
 
 
 [address] [facility] [address] [facility] [address] 
[phone] Patient: Yancy Shelton MRN: [id number] UPA:[id number] Visit Information Date & Time Provider Department Dept. Phone Encounter #  
 8/8/2018 10:00 AM Ghassan Pfeiffer MD [facility] "[employer] [id number] Follow-up Instructions Return in about 6 months (around 2/8/2019). Follow-up and Disposition History Your Appointments 1/4/2019  9:00 AM  
Office Visit with Kadie Mike MD  
[facility]) Appt Note: 6 months post labs [address] [facility], [address] [facility] [address]  
[address] [address]  
  
    
 2/8/2019  9:45 AM  
Follow Up with Ghassan Pfeiffer MD  
[facility] "[employer] [address] [facility]) Appt Note: 6 MONTH FU  
 [address] [facility] [address] [facility] [address]  
[phone]  
  
   
 [address] [address] Upcoming Health Maintenance Date Due Hepatitis C Screening 1950 DTaP/Tdap/Td series (1 - Tdap) 8/25/1972 ZOSTER VACCINE AGE 60> 6/25/2011 GLAUCOMA SCREENING Q2Y 8/25/2016 Pneumococcal 65+ High/Highest Risk (1 of 2 - PCV13) 8/25/2016 MEDICARE YEARLY EXAM 3/28/2018 Influenza Age 5 to Adult 8/1/2018 BREAST CANCER SCRN MAMMOGRAM 8/1/2020 COLONOSCOPY 10/17/2022 Allergies as of 8/8/2018  Review Complete On: 8/8/2018 By: Ghassan Pfeiffer MD  
  
 Severity Noted Reaction Type Reactions Lipitor [Atorvastatin]  03/01/2017    Myalgia Quit 2/17 Nsaids (Non-steroidal Anti-inflammatory Drug)  10/17/2012    Other (comments) Severe abdominal pain  
 Pcn [Penicillins]  10/17/2012    Swelling Swelling, hives & photosensitive rxn Current Immunizations  Reviewed on 10/28/2016 Name Date Influenza Vaccine 9/19/2016 Poliovirus vaccine 5/14/1998 Not reviewed this visit You Were Diagnosed With   
  
 Codes Comments Personal history of breast cancer    -  Primary ICD-10-CM: Z85.3 ICD-9-CM: V10.3 Vitals OB Status Smoking Status Postmenopausal Former Smoker Preferred Pharmacy Pharmacy Name Phone [facility] Angel Omalley [address] [place] [phone] Your Updated Medication List  
  
   
This list is accurate as of 8/8/18 10:48 AM.  Always use your most recent med list.  
  
  
  
  
 albuterol 90 mcg/actuation inhaler Commonly known as:  PROVENTIL HFA, VENTOLIN HFA, PROAIR HFA Take 1 Puff by inhalation every four (4) hours as needed for Wheezing. DEXILANT 60 mg Cpdb Generic drug:  Dexlansoprazole Take  by mouth Daily (before breakfast). FEMARA 2.5 mg tablet Generic drug:  letrozole Take 2.5 mg by mouth daily. furosemide 20 mg tablet Commonly known as:  LASIX Take 1 Tab by mouth daily as needed. GAVISCON EXTRA STRENGTH 160-105 mg [place] Generic drug:  aluminum hydrox-magnesium carb Take 2 Tabs by mouth as needed. labetalol 200 mg tablet Commonly known as:  NORMODYNE  
TAKE ONE TABLET BY MOUTH 2 TIMES A DAY  
  
 losartan 100 mg tablet Commonly known as:  COZAAR Take 1 Tab by mouth daily. OMEGA 3 PO Take  by mouth. RESTASIS 0.05 % ophthalmic emulsion Generic drug:  cycloSPORINE Administer 1 Drop to both eyes two (2) times a day. TYLENOL EXTRA STRENGTH 500 mg tablet Generic drug:  acetaminophen Take 1,000 mg by mouth every six (6) hours as needed. Follow-up Instructions Return in about 6 months (around 2/8/2019). Introducing [facility] & HEALTH SERVICES! Dear sOwald Blancas: Thank you for requesting a [employer] account. Our records indicate that you already have an active [employer] account.   You can access your account anytime at [email or web address] Did you know that you can access your hospital and ER discharge instructions at any time in [employer]? You can also review all of your test results from your hospital stay or ER visit. Additional Information If you have questions, please visit the Frequently Asked Questions section of the [employer] website at [email or web address] Remember, [employer] is NOT to be used for urgent needs. For medical emergencies, dial 911. Now available from your iPhone and Android! Please provide this summary of care documentation to your next provider. Your primary care clinician is listed as Horacio Ruiz. If you have any questions after today's visit, please call [phone].

## 2018-08-08 NOTE — PROGRESS NOTES
Progress Note    Patient: Monty Mejia  MRN: [id number]  SSN: [id number]   YOB: 1950  Age: 78 y.o. Sex: female     No chief complaint on file. HPI    Ms. Elias Colin is a 71-year-old woman with a T1b N0 M0 right breast cancer in 2017 she is done well since and today she is back with a normal exam and normal mammogram.  She does describe some pulling in her right axilla and is doing exercises on her own to stretch this out and having good success. Past Medical History:   Diagnosis Date    Acute reaction to stress     Closed fracture of left wrist     Diastolic dysfunction     Fatty liver     GERD (gastroesophageal reflux disease)     Hyperlipidemia     Hypertension     diastolic dysfunction    Hypoglycemia     Kidney stones     11/16 for yrs; spon passage always so far    RSD (reflex sympathetic dystrophy) 8/2009    no blood pressure or sticks in left arm    Syncope 2000    no recurrence since; was orthostatic at that time    Unspecified adverse effect of anesthesia     hard to sedate     Past Surgical History:   Procedure Laterality Date    HX CARPAL TUNNEL RELEASE      HX GYN      lap. fibroid removal    HX HEART CATHETERIZATION  2005 approx    normal coronaries per pt    HX MASTECTOMY Right 11/30/2017    RIGHT BREAST LUMPECTOMY WITH NEEDLE LOCALIZATION Alba Kwan LYMPH NODE BIOPSY  performed by Juancho Li MD at [facility] MAIN OR    HX ORTHOPAEDIC      ORIF left wrist    HX SHOULDER ARTHROSCOPY Left 1988     Allergies   Allergen Reactions    Lipitor [Atorvastatin] Myalgia     Quit 2/17    Nsaids (Non-Steroidal Anti-Inflammatory Drug) Other (comments)     Severe abdominal pain    Pcn [Penicillins] Swelling     Swelling, hives & photosensitive rxn     Current Outpatient Prescriptions   Medication Sig Dispense Refill    labetalol (NORMODYNE) 200 mg tablet TAKE ONE TABLET BY MOUTH 2 TIMES A  Tab 2    losartan (COZAAR) 100 mg tablet Take 1 Tab by mouth daily.  90 Tab 2    furosemide (LASIX) 20 mg tablet Take 1 Tab by mouth daily as needed. 30 Tab 2    letrozole (FEMARA) 2.5 mg tablet Take 2.5 mg by mouth daily.  albuterol (PROVENTIL HFA, VENTOLIN HFA, PROAIR HFA) 90 mcg/actuation inhaler Take 1 Puff by inhalation every four (4) hours as needed for Wheezing. 1 Inhaler 5    FLAXSEED OIL (OMEGA 3 PO) Take  by mouth.  Dexlansoprazole (DEXILANT) 60 mg CpDB Take  by mouth Daily (before breakfast).  cycloSPORINE (RESTASIS) 0.05 % ophthalmic emulsion Administer 1 Drop to both eyes two (2) times a day.  aluminum hydrox-magnesium carb (GAVISCON EXTRA STRENGTH) 160-105 mg chew Take 2 Tabs by mouth as needed.  acetaminophen (TYLENOL EXTRA STRENGTH) 500 mg tablet Take 1,000 mg by mouth every six (6) hours as needed. Social History     Social History    Marital status:      Spouse name: N/A    Number of children: N/A    Years of education: N/A     Occupational History    Not on file.      Social History Main Topics    Smoking status: Former Smoker     Packs/day: 0.50     Years: 11.00     Quit date: 1/1/2003    Smokeless tobacco: Never Used    Alcohol use 0.6 oz/week     1 Glasses of wine per week      Comment: rarely- 3-4 drinks/yr    Drug use: No    Sexual activity: Not Currently     Other Topics Concern    Not on file     Social History Narrative     Family History   Problem Relation Age of Onset    Diabetes Maternal Aunt     Cancer Maternal Uncle      throat    Cancer Maternal Grandmother      melanoma    Heart Disease Other     Heart Attack Paternal Grandmother 48    Cancer Maternal Grandfather      Melanoma    Stroke Neg Hx          Review of systems:  Patient denies any reflux, emesis, abdominal pain, change in bowel habits, hematochezia, melena, fever, weight loss, fatigue chills, dermatitis, abnormal moles, change in vision, vertigo, epistaxis, dysphagia, hoarseness, chest pain, palpitations, hypertension, edema, cough, shortness of breath, wheezing, hemoptysis, snoring, hematuria, diabetes, thyroid disease, anemia, bruising, history of blood transfusion, dizziness, headache, or fainting. Physical Examination    Well developed well nourished female in no apparent distress  There were no vitals taken for this visit. Head: normocephalic, atraumatic  Mouth: Clear, no overt lesions, oral mucosa pink and moist  Neck: supple, no masses, no adenopathy or carotid bruits, trachea midline  Resp: clear to auscultation bilaterally, no wheeze, rhonchi or rales, excursions normal and symmetrical  Cardio: Regular rate and rhythm, no murmurs, clicks, gallops or rubs, no edema or varicosities  Abdomen: soft, nontender, nondistended, normoactive bowel sounds, no hernias, no hepatosplenomegaly,   Back: Deferred  Extremeties: warm, well-perfused, no tenderness or swelling, normal gait/station  Neuro: sensation and strength grossly intact and symmetrical  Psych: alert and oriented to person, place and time  Breast exam bilateral breast exam without dominant mass, skin change, nipple discharge, or lymphadenopathy    IMPRESSION  Stage right breast cancer now without evidence of disease, a normal exam and mammogram.    PLAN  No orders of the defined types were placed in this encounter.     Follow-up in 6 months with mammogram  Walker Tom MD

## 2018-08-10 ENCOUNTER — OFFICE VISIT (OUTPATIENT)
Dept: UROLOGY | Age: 68
End: 2018-08-10

## 2018-08-10 ENCOUNTER — APPOINTMENT (OUTPATIENT)
Dept: CT IMAGING | Age: 68
End: 2018-08-10
Attending: EMERGENCY MEDICINE
Payer: MEDICARE

## 2018-08-10 ENCOUNTER — HOSPITAL ENCOUNTER (EMERGENCY)
Age: 68
Discharge: HOME | End: 2018-08-10
Attending: EMERGENCY MEDICINE
Payer: MEDICARE

## 2018-08-10 VITALS
HEART RATE: 79 BPM | OXYGEN SATURATION: 95 % | DIASTOLIC BLOOD PRESSURE: 66 MMHG | HEIGHT: 61 IN | SYSTOLIC BLOOD PRESSURE: 92 MMHG | BODY MASS INDEX: 34.55 KG/M2 | TEMPERATURE: 98 F | WEIGHT: 183 LBS

## 2018-08-10 VITALS
SYSTOLIC BLOOD PRESSURE: 167 MMHG | TEMPERATURE: 97 F | HEART RATE: 74 BPM | DIASTOLIC BLOOD PRESSURE: 98 MMHG | RESPIRATION RATE: 22 BRPM | OXYGEN SATURATION: 99 %

## 2018-08-10 DIAGNOSIS — N39.0 URINARY TRACT INFECTION WITHOUT HEMATURIA, SITE UNSPECIFIED: ICD-10-CM

## 2018-08-10 DIAGNOSIS — N23 RENAL COLIC: Primary | ICD-10-CM

## 2018-08-10 DIAGNOSIS — N20.0 KIDNEY STONE: Primary | ICD-10-CM

## 2018-08-10 DIAGNOSIS — N20.0 KIDNEY STONES: Primary | ICD-10-CM

## 2018-08-10 LAB
ALBUMIN SERPL-MCNC: 4.2 G/DL (ref 3.4–5)
ALBUMIN/GLOB SERPL: 1.3 {RATIO} (ref 0.8–1.7)
ALP SERPL-CCNC: 93 U/L (ref 45–117)
ALT SERPL-CCNC: 38 U/L (ref 13–56)
ANION GAP SERPL CALC-SCNC: 8 MMOL/L (ref 3–18)
APPEARANCE UR: CLEAR
AST SERPL-CCNC: 22 U/L (ref 15–37)
BACTERIA URNS QL MICRO: ABNORMAL /HPF
BASOPHILS # BLD: 0 K/UL (ref 0–0.1)
BASOPHILS NFR BLD: 0 % (ref 0–2)
BILIRUB SERPL-MCNC: 0.2 MG/DL (ref 0.2–1)
BILIRUB UR QL: NEGATIVE
BUN SERPL-MCNC: 33 MG/DL (ref 7–18)
BUN/CREAT SERPL: 21 (ref 12–20)
CALCIUM SERPL-MCNC: 9.6 MG/DL (ref 8.5–10.1)
CHLORIDE SERPL-SCNC: 106 MMOL/L (ref 100–108)
CO2 SERPL-SCNC: 25 MMOL/L (ref 21–32)
COLOR UR: YELLOW
CREAT SERPL-MCNC: 1.56 MG/DL (ref 0.6–1.3)
DIFFERENTIAL METHOD BLD: ABNORMAL
EOSINOPHIL # BLD: 0 K/UL (ref 0–0.4)
EOSINOPHIL NFR BLD: 0 % (ref 0–5)
EPITH CASTS URNS QL MICRO: ABNORMAL /LPF (ref 0–5)
ERYTHROCYTE [DISTWIDTH] IN BLOOD BY AUTOMATED COUNT: 13.3 % (ref 11.6–14.5)
GLOBULIN SER CALC-MCNC: 3.2 G/DL (ref 2–4)
GLUCOSE SERPL-MCNC: 126 MG/DL (ref 74–99)
GLUCOSE UR STRIP.AUTO-MCNC: NEGATIVE MG/DL
HCT VFR BLD AUTO: 34.8 % (ref 35–45)
HGB BLD-MCNC: 12.2 G/DL (ref 12–16)
HGB UR QL STRIP: ABNORMAL
HYALINE CASTS URNS QL MICRO: ABNORMAL /LPF (ref 0–2)
KETONES UR QL STRIP.AUTO: ABNORMAL MG/DL
LEUKOCYTE ESTERASE UR QL STRIP.AUTO: ABNORMAL
LYMPHOCYTES # BLD: 0.8 K/UL (ref 0.9–3.6)
LYMPHOCYTES NFR BLD: 7 % (ref 21–52)
MCH RBC QN AUTO: 30.1 PG (ref 24–34)
MCHC RBC AUTO-ENTMCNC: 35.1 G/DL (ref 31–37)
MCV RBC AUTO: 85.9 FL (ref 74–97)
MONOCYTES # BLD: 0.4 K/UL (ref 0.05–1.2)
MONOCYTES NFR BLD: 4 % (ref 3–10)
NEUTS SEG # BLD: 9.4 K/UL (ref 1.8–8)
NEUTS SEG NFR BLD: 89 % (ref 40–73)
NITRITE UR QL STRIP.AUTO: NEGATIVE
PH UR STRIP: 5.5 [PH] (ref 5–8)
PLATELET # BLD AUTO: 224 K/UL (ref 135–420)
PMV BLD AUTO: 9.7 FL (ref 9.2–11.8)
POTASSIUM SERPL-SCNC: 4.3 MMOL/L (ref 3.5–5.5)
PROT SERPL-MCNC: 7.4 G/DL (ref 6.4–8.2)
PROT UR STRIP-MCNC: 30 MG/DL
RBC # BLD AUTO: 4.05 M/UL (ref 4.2–5.3)
RBC #/AREA URNS HPF: ABNORMAL /HPF (ref 0–5)
SODIUM SERPL-SCNC: 139 MMOL/L (ref 136–145)
SP GR UR REFRACTOMETRY: 1.03 (ref 1–1.03)
UROBILINOGEN UR QL STRIP.AUTO: 1 EU/DL (ref 0.2–1)
WBC # BLD AUTO: 10.6 K/UL (ref 4.6–13.2)
WBC URNS QL MICRO: ABNORMAL /HPF (ref 0–4)

## 2018-08-10 PROCEDURE — 96375 TX/PRO/DX INJ NEW DRUG ADDON: CPT

## 2018-08-10 PROCEDURE — 74011250636 HC RX REV CODE- 250/636: Performed by: EMERGENCY MEDICINE

## 2018-08-10 PROCEDURE — 96374 THER/PROPH/DIAG INJ IV PUSH: CPT

## 2018-08-10 PROCEDURE — 81001 URINALYSIS AUTO W/SCOPE: CPT | Performed by: EMERGENCY MEDICINE

## 2018-08-10 PROCEDURE — 80053 COMPREHEN METABOLIC PANEL: CPT | Performed by: EMERGENCY MEDICINE

## 2018-08-10 PROCEDURE — 99283 EMERGENCY DEPT VISIT LOW MDM: CPT

## 2018-08-10 PROCEDURE — 74176 CT ABD & PELVIS W/O CONTRAST: CPT

## 2018-08-10 PROCEDURE — 85025 COMPLETE CBC W/AUTO DIFF WBC: CPT | Performed by: EMERGENCY MEDICINE

## 2018-08-10 PROCEDURE — 96376 TX/PRO/DX INJ SAME DRUG ADON: CPT

## 2018-08-10 RX ORDER — KETOROLAC TROMETHAMINE 30 MG/ML
10 INJECTION, SOLUTION INTRAMUSCULAR; INTRAVENOUS
Status: COMPLETED | OUTPATIENT
Start: 2018-08-10 | End: 2018-08-10

## 2018-08-10 RX ORDER — MORPHINE SULFATE 4 MG/ML
4 INJECTION INTRAVENOUS
Status: COMPLETED | OUTPATIENT
Start: 2018-08-10 | End: 2018-08-10

## 2018-08-10 RX ORDER — CHOLECALCIFEROL TAB 125 MCG (5000 UNIT) 125 MCG
TAB ORAL DAILY
COMMUNITY

## 2018-08-10 RX ORDER — ONDANSETRON 2 MG/ML
4 INJECTION INTRAMUSCULAR; INTRAVENOUS
Status: COMPLETED | OUTPATIENT
Start: 2018-08-10 | End: 2018-08-10

## 2018-08-10 RX ORDER — OXYCODONE AND ACETAMINOPHEN 5; 325 MG/1; MG/1
1 TABLET ORAL
Qty: 10 TAB | Refills: 0 | Status: SHIPPED | OUTPATIENT
Start: 2018-08-10 | End: 2019-02-01

## 2018-08-10 RX ORDER — MORPHINE SULFATE 4 MG/ML
4 INJECTION INTRAVENOUS ONCE
Status: COMPLETED | OUTPATIENT
Start: 2018-08-10 | End: 2018-08-10

## 2018-08-10 RX ORDER — SULFAMETHOXAZOLE AND TRIMETHOPRIM 800; 160 MG/1; MG/1
1 TABLET ORAL 2 TIMES DAILY
Qty: 6 TAB | Refills: 0 | Status: SHIPPED | OUTPATIENT
Start: 2018-08-10 | End: 2018-08-13

## 2018-08-10 RX ORDER — ONDANSETRON 4 MG/1
4 TABLET, FILM COATED ORAL
Qty: 10 TAB | Refills: 0 | Status: SHIPPED | OUTPATIENT
Start: 2018-08-10 | End: 2019-02-01

## 2018-08-10 RX ORDER — TAMSULOSIN HYDROCHLORIDE 0.4 MG/1
0.4 CAPSULE ORAL DAILY
Qty: 15 CAP | Refills: 0 | Status: SHIPPED | OUTPATIENT
Start: 2018-08-10 | End: 2018-08-25

## 2018-08-10 RX ADMIN — ONDANSETRON 4 MG: 2 INJECTION, SOLUTION INTRAMUSCULAR; INTRAVENOUS at 05:48

## 2018-08-10 RX ADMIN — KETOROLAC TROMETHAMINE 10 MG: 30 INJECTION, SOLUTION INTRAMUSCULAR at 08:14

## 2018-08-10 RX ADMIN — MORPHINE SULFATE 4 MG: 4 INJECTION INTRAVENOUS at 07:18

## 2018-08-10 RX ADMIN — MORPHINE SULFATE 4 MG: 4 INJECTION INTRAVENOUS at 05:48

## 2018-08-10 NOTE — ED TRIAGE NOTES
Pt awoke from sleep with sudden onset left flank pain that radiates to groin and nausea vomiting, pt has hx of kidney stones, rates pain  10/10, is in obvious distress in triage, no issues with urination or bowels

## 2018-08-10 NOTE — MR AVS SNAPSHOT
[address] [facility] [address] [address] 
[phone] Patient: Rhea Miller MRN: [id number] SARAVANAN:[id number] Visit Information Date & Time Provider Department Dept. Phone Encounter #  
 8/10/2018  2:30 PM Lynford Peabody, Rosalina [address] [facility] [phone] Your Appointments 8/31/2018 10:00 AM  
Office Visit with Crissy Trevino MD  
[facility] Appt Note: after kub [address] [facility] [address] [address]  
[phone] [address] [id number]  
  
    
 1/4/2019  9:00 AM  
Office Visit with Radha Alvarez MD  
[facility]) Appt Note: 6 months post labs [address] [facility], [address] [facility] [address]  
[address], [address] [address]  
  
    
 2/8/2019  9:45 AM  
Follow Up with Nichol Cottrell MD  
[facility]) Appt Note: 6 MONTH FU  
 [address] [facility] [address] [facility] [address]  
[phone]  
  
   
 [address] [facility] [address] [address] Upcoming Health Maintenance Date Due Hepatitis C Screening 1950 DTaP/Tdap/Td series (1 - Tdap) 8/25/1972 ZOSTER VACCINE AGE 60> 6/25/2011 GLAUCOMA SCREENING Q2Y 8/25/2016 Pneumococcal 65+ High/Highest Risk (1 of 2 - PCV13) 8/25/2016 MEDICARE YEARLY EXAM 3/28/2018 Influenza Age 5 to Adult 8/1/2018 BREAST CANCER SCRN MAMMOGRAM 8/1/2020 COLONOSCOPY 10/17/2022 Allergies as of 8/10/2018  Review Complete On: 8/10/2018 By: Juliet Rodriguez LPN Severity Noted Reaction Type Reactions Lipitor [Atorvastatin]  03/01/2017    Myalgia Quit 2/17 Nsaids (Non-steroidal Anti-inflammatory Drug)  10/17/2012    Other (comments) Severe abdominal pain  
 Pcn [Penicillins]  10/17/2012    Swelling Swelling, hives & photosensitive rxn Statins-hmg-coa Reductase Inhibitors  08/10/2018    Other (comments) Lag crumps Current Immunizations  Reviewed on 10/28/2016 Name Date Influenza Vaccine 9/19/2016 Poliovirus vaccine 5/14/1998 Not reviewed this visit You Were Diagnosed With   
  
 Codes Comments Kidney stones    -  Primary ICD-10-CM: N20.0 ICD-9-CM: 592.0 Vitals BP Pulse Temp Height(growth percentile) Weight(growth percentile) SpO2  
 92/66 (BP 1 Location: Left arm, BP Patient Position: Sitting) 79 98 °F (36.7 °C) (Oral) 5' 1\" (1.549 m) 183 lb (83 kg) 95% BMI OB Status Smoking Status 34.58 kg/m2 Postmenopausal Former Smoker Vitals History BMI and BSA Data Body Mass Index Body Surface Area 34.58 kg/m 2 1.89 m 2 Preferred Pharmacy Pharmacy Name Phone [facility] [address] [place], [place] [phone] Your Updated Medication List  
  
   
This list is accurate as of 8/10/18  3:41 PM.  Always use your most recent med list.  
  
  
  
  
 albuterol 90 mcg/actuation inhaler Commonly known as:  PROVENTIL HFA, VENTOLIN HFA, PROAIR HFA Take 1 Puff by inhalation every four (4) hours as needed for Wheezing. cholecalciferol (VITAMIN D3) 5,000 unit Tab tablet Commonly known as:  VITAMIN D3 Take  by mouth daily. DEXILANT 60 mg Cpdb Generic drug:  Dexlansoprazole Take  by mouth Daily (before breakfast). FEMARA 2.5 mg tablet Generic drug:  letrozole Take 2.5 mg by mouth daily. furosemide 20 mg tablet Commonly known as:  LASIX Take 1 Tab by mouth daily as needed. GAVISCON EXTRA STRENGTH 160-105 mg [place] Generic drug:  aluminum hydrox-magnesium carb Take 2 Tabs by mouth as needed. labetalol 200 mg tablet Commonly known as:  NORMODYNE  
TAKE ONE TABLET BY MOUTH 2 TIMES A DAY  
  
 losartan 100 mg tablet Commonly known as:  COZAAR Take 1 Tab by mouth daily. OMEGA 3 PO Take  by mouth. ondansetron hcl 4 mg tablet Commonly known as:  Donnel Blank Take 1 Tab by mouth every eight (8) hours as needed for Nausea. oxyCODONE-acetaminophen 5-325 mg per tablet Commonly known as:  PERCOCET Take 1 Tab by mouth every four (4) hours as needed for Pain. Max Daily Amount: 6 Tabs. RESTASIS 0.05 % ophthalmic emulsion Generic drug:  cycloSPORINE Administer 1 Drop to both eyes two (2) times a day. tamsulosin 0.4 mg capsule Commonly known as:  FLOMAX Take 1 Cap by mouth daily for 15 days. trimethoprim-sulfamethoxazole 160-800 mg per tablet Commonly known as:  BACTRIM DS Take 1 Tab by mouth two (2) times a day for 3 days. TYLENOL PM PO Take  by mouth. To-Do List   
 Around 08/10/2018 Imaging:  XR ABD (KUB) Patient Instructions Kidney Stone: Care Instructions Your Care Instructions Kidney stones are formed when salts, minerals, and other substances normally found in the urine clump together. They can be as small as grains of sand or, rarely, as large as golf balls. While the stone is traveling through the ureter, which is the tube that carries urine from the kidney to the bladder, you will probably feel pain. The pain may be mild or very severe. You may also have some blood in your urine. As soon as the stone reaches the bladder, any intense pain should go away. If a stone is too large to pass on its own, you may need a medical procedure to help you pass the stone. The doctor has checked you carefully, but problems can develop later. If you notice any problems or new symptoms, get medical treatment right away. Follow-up care is a key part of your treatment and safety. Be sure to make and go to all appointments, and call your doctor if you are having problems.  It's also a good idea to know your test results and keep a list of the medicines you take. How can you care for yourself at home? · Drink plenty of fluids, enough so that your urine is light yellow or clear like water. If you have kidney, heart, or liver disease and have to limit fluids, talk with your doctor before you increase the amount of fluids you drink. · Take pain medicines exactly as directed. Call your doctor if you think you are having a problem with your medicine. ¨ If the doctor gave you a prescription medicine for pain, take it as prescribed. ¨ If you are not taking a prescription pain medicine, ask your doctor if you can take an over-the-counter medicine. Read and follow all instructions on the label. · Your doctor may ask you to strain your urine so that you can collect your kidney stone when it passes. You can use a kitchen strainer or a tea strainer to catch the stone. Store it in a plastic bag until you see your doctor again. Preventing future kidney stones Some changes in your diet may help prevent kidney stones. Depending on the cause of your stones, your doctor may recommend that you: · Drink plenty of fluids, enough so that your urine is light yellow or clear like water. If you have kidney, heart, or liver disease and have to limit fluids, talk with your doctor before you increase the amount of fluids you drink. · Limit coffee, tea, and alcohol. Also avoid grapefruit juice. · Do not take more than the recommended daily dose of vitamins C and D. 
· Avoid antacids such as Gaviscon, Maalox, Mylanta, or Tums. · Limit the amount of salt (sodium) in your diet. · Eat a balanced diet that is not too high in protein. · Limit foods that are high in a substance called oxalate, which can cause kidney stones. These foods include dark green vegetables, rhubarb, chocolate, wheat bran, nuts, cranberries, and beans. When should you call for help? Call your doctor now or seek immediate medical care if: 
  · You cannot keep down fluids.   · Your pain gets worse.  
  · You have a fever or chills.  
  · You have new or worse pain in your back just below your rib cage (the flank area).  
  · You have new or more blood in your urine.  
 Watch closely for changes in your health, and be sure to contact your doctor if: 
  · You do not get better as expected. Where can you learn more? Go to http://christina-jose de jesus.info/. Enter L529 in the search box to learn more about \"Kidney Stone: Care Instructions. \" Current as of: May 12, 2017 Content Version: 11.7 © 3432-1763 [employer]. Care instructions adapted under license by [employer] (which disclaims liability or warranty for this information). If you have questions about a medical condition or this instruction, always ask your healthcare professional. [address] any warranty or liability for your use of this information. Introducing [facility] & HEALTH SERVICES! Dear Emilia Friday: Thank you for requesting a [employer] account. Our records indicate that you already have an active [employer] account. You can access your account anytime at [email or web address] Did you know that you can access your hospital and ER discharge instructions at any time in [employer]? You can also review all of your test results from your hospital stay or ER visit. Additional Information If you have questions, please visit the Frequently Asked Questions section of the [employer] website at [email or web address] Remember, [employer] is NOT to be used for urgent needs. For medical emergencies, dial 911. Now available from your iPhone and Android! Please provide this summary of care documentation to your next provider. Your primary care clinician is listed as Sydnee Dillard. If you have any questions after today's visit, please call [phone].

## 2018-08-10 NOTE — ED PROVIDER NOTES
EMERGENCY DEPARTMENT HISTORY AND PHYSICAL EXAM    4:54 AM      Date: 8/10/2018  Patient Name: Otilia Sawyer    History of Presenting Illness     Chief Complaint   Patient presents with    Flank Pain     left         History Provided By: Patient    Chief Complaint: Flank Pain   Duration:  Today 1AM  Timing:  Acute  Location: LLQ radiating to Left Flank   Quality: Aching  Severity: 10 out of 10  Modifying Factors: None   Associated Symptoms: Patient reports associated symptoms of LLQ abdominal pain, nausea and vomiting; reports 7 episodes of vomiting within 2 hours. Denies other associated symptoms, such as hematuria. Additional History (Context): Otilia Sawyer is a 78 y.o. female with PMHx of Hypoglycemia, HTN, GERD, Kidney stones, and Hyperlipidemia who presents with left flank pain onset 1AM Today. Patient reports that she began experiencing sudden onset of LLQ abdominal pain, that radiated to her left flank. Patient reports associated symptoms of nausea and vomiting; reports 7 episodes of vomiting within 2 hours. Denies other associated symptoms, such as hematuria. Reports hx of Kidney stones. Notes that this presentation feels similar to her previous presentation of kidney stones. No other concerns were expressed at this time. PCP: Raphael Vaca MD    Current Outpatient Prescriptions   Medication Sig Dispense Refill    trimethoprim-sulfamethoxazole (BACTRIM DS) 160-800 mg per tablet Take 1 Tab by mouth two (2) times a day for 3 days. 6 Tab 0    ondansetron hcl (ZOFRAN) 4 mg tablet Take 1 Tab by mouth every eight (8) hours as needed for Nausea. 10 Tab 0    tamsulosin (FLOMAX) 0.4 mg capsule Take 1 Cap by mouth daily for 15 days. 15 Cap 0    oxyCODONE-acetaminophen (PERCOCET) 5-325 mg per tablet Take 1 Tab by mouth every four (4) hours as needed for Pain. Max Daily Amount: 6 Tabs.  10 Tab 0    labetalol (NORMODYNE) 200 mg tablet TAKE ONE TABLET BY MOUTH 2 TIMES A  Tab 2    losartan (COZAAR) 100 mg tablet Take 1 Tab by mouth daily. 90 Tab 2    furosemide (LASIX) 20 mg tablet Take 1 Tab by mouth daily as needed. 30 Tab 2    letrozole (FEMARA) 2.5 mg tablet Take 2.5 mg by mouth daily.  albuterol (PROVENTIL HFA, VENTOLIN HFA, PROAIR HFA) 90 mcg/actuation inhaler Take 1 Puff by inhalation every four (4) hours as needed for Wheezing. 1 Inhaler 5    FLAXSEED OIL (OMEGA 3 PO) Take  by mouth.  Dexlansoprazole (DEXILANT) 60 mg CpDB Take  by mouth Daily (before breakfast).  cycloSPORINE (RESTASIS) 0.05 % ophthalmic emulsion Administer 1 Drop to both eyes two (2) times a day.  aluminum hydrox-magnesium carb (GAVISCON EXTRA STRENGTH) 160-105 mg chew Take 2 Tabs by mouth as needed.          Past History     Past Medical History:  Past Medical History:   Diagnosis Date    Acute reaction to stress     Closed fracture of left wrist     Diastolic dysfunction     Fatty liver     GERD (gastroesophageal reflux disease)     Hyperlipidemia     Hypertension     diastolic dysfunction    Hypoglycemia     Kidney stones     11/16 for yrs; spon passage always so far    RSD (reflex sympathetic dystrophy) 8/2009    no blood pressure or sticks in left arm    Syncope 2000    no recurrence since; was orthostatic at that time    Unspecified adverse effect of anesthesia     hard to sedate       Past Surgical History:  Past Surgical History:   Procedure Laterality Date    HX CARPAL TUNNEL RELEASE      HX GYN      lap. fibroid removal    HX HEART CATHETERIZATION  2005 approx    normal coronaries per pt    HX MASTECTOMY Right 11/30/2017    RIGHT BREAST LUMPECTOMY WITH NEEDLE LOCALIZATION Frankey Jacquelyning LYMPH NODE BIOPSY  performed by Jimmy Romero MD at [address] [place] [address] HX ORTHOPAEDIC      ORIF left wrist    HX SHOULDER ARTHROSCOPY Left 1988       Family History:  Family History   Problem Relation Age of Onset    Diabetes Maternal Aunt     Cancer Maternal Uncle      throat    Cancer Maternal Grandmother melanoma    Heart Disease Other     Heart Attack Paternal Grandmother 48    Cancer Maternal Grandfather      Melanoma    Stroke Neg Hx        Social History:  Social History   Substance Use Topics    Smoking status: Former Smoker     Packs/day: 0.50     Years: 11.00     Quit date: 1/1/2003    Smokeless tobacco: Never Used    Alcohol use 0.6 oz/week     1 Glasses of wine per week      Comment: rarely- 3-4 drinks/yr       Allergies: Allergies   Allergen Reactions    Lipitor [Atorvastatin] Myalgia     Quit 2/17    Nsaids (Non-Steroidal Anti-Inflammatory Drug) Other (comments)     Severe abdominal pain    Pcn [Penicillins] Swelling     Swelling, hives & photosensitive rxn         Review of Systems     Review of Systems   Gastrointestinal: Positive for abdominal pain, nausea and vomiting. Genitourinary: Positive for flank pain. Negative for hematuria. All other systems reviewed and are negative. Physical Exam     Visit Vitals    BP (!) 167/98    Pulse 74    Temp 97 °F (36.1 °C)    Resp 22    SpO2 99%     Physical Exam  Physical Exam:  . Patient Vitals for the past 12 hrs:   Temp Pulse Resp BP SpO2   08/10/18 0448 97 °F (36.1 °C) 74 22 (!) 167/98 99 %     Gen: Well developed, well nourished 78 y.o. female  HEENT: Normocephalic, atraumatic  Respiratory: No accessory muscle use No wheeze, No rales, No rhonchi. Normal chest wall excursion. No subcutaneous air, no rib crepitus  Cardiovascular: Regular rhythm and rate, Normal pulses, Normal perfusion. No edema  Gastrointestinal: Non distended, LLQ Tenderness, No masses. No ascites. No organomegaly. No evidence of trauma  Musculoskeletal: Full range of motion at all other tested joints. No joint effusions. Neuro: Normal strength, Normal sensation. Normal speech. No ataxia. Cranial Nerves II-XII normal as tested. Skin: No rash, petechia or purpura. Warm and dry  Psyche: No suicidal ideation, No homicidal ideation. No hallucinations.  Organized thoughts. Heme: Normal  : Deferred        Diagnostic Study Results     Labs -  Recent Results (from the past 12 hour(s))   URINALYSIS W/ RFLX MICROSCOPIC    Collection Time: 08/10/18  5:03 AM   Result Value Ref Range    Color YELLOW      Appearance CLEAR      Specific gravity 1.026 1.005 - 1.030      pH (UA) 5.5 5.0 - 8.0      Protein 30 (A) NEG mg/dL    Glucose NEGATIVE  NEG mg/dL    Ketone TRACE (A) NEG mg/dL    Bilirubin NEGATIVE  NEG      Blood LARGE (A) NEG      Urobilinogen 1.0 0.2 - 1.0 EU/dL    Nitrites NEGATIVE  NEG      Leukocyte Esterase TRACE (A) NEG     URINE MICROSCOPIC ONLY    Collection Time: 08/10/18  5:03 AM   Result Value Ref Range    WBC 0 to 3 0 - 4 /hpf    RBC 36 to 50 0 - 5 /hpf    Epithelial cells FEW 0 - 5 /lpf    Bacteria FEW (A) NEG /hpf    Hyaline cast 0 to 3 0 - 2 /lpf   CBC WITH AUTOMATED DIFF    Collection Time: 08/10/18  5:45 AM   Result Value Ref Range    WBC 10.6 4.6 - 13.2 K/uL    RBC 4.05 (L) 4.20 - 5.30 M/uL    HGB 12.2 12.0 - 16.0 g/dL    HCT 34.8 (L) 35.0 - 45.0 %    MCV 85.9 74.0 - 97.0 FL    MCH 30.1 24.0 - 34.0 PG    MCHC 35.1 31.0 - 37.0 g/dL    RDW 13.3 11.6 - 14.5 %    PLATELET 132 039 - 617 K/uL    MPV 9.7 9.2 - 11.8 FL    NEUTROPHILS 89 (H) 40 - 73 %    LYMPHOCYTES 7 (L) 21 - 52 %    MONOCYTES 4 3 - 10 %    EOSINOPHILS 0 0 - 5 %    BASOPHILS 0 0 - 2 %    ABS. NEUTROPHILS 9.4 (H) 1.8 - 8.0 K/UL    ABS. LYMPHOCYTES 0.8 (L) 0.9 - 3.6 K/UL    ABS. MONOCYTES 0.4 0.05 - 1.2 K/UL    ABS. EOSINOPHILS 0.0 0.0 - 0.4 K/UL    ABS.  BASOPHILS 0.0 0.0 - 0.1 K/UL    DF AUTOMATED     METABOLIC PANEL, COMPREHENSIVE    Collection Time: 08/10/18  5:45 AM   Result Value Ref Range    Sodium 139 136 - 145 mmol/L    Potassium 4.3 3.5 - 5.5 mmol/L    Chloride 106 100 - 108 mmol/L    CO2 25 21 - 32 mmol/L    Anion gap 8 3.0 - 18 mmol/L    Glucose 126 (H) 74 - 99 mg/dL    BUN 33 (H) 7.0 - 18 MG/DL    Creatinine 1.56 (H) 0.6 - 1.3 MG/DL    BUN/Creatinine ratio 21 (H) 12 - 20      GFR est AA 40 (L) >60 ml/min/1.73m2    GFR est non-AA 33 (L) >60 ml/min/1.73m2    Calcium 9.6 8.5 - 10.1 MG/DL    Bilirubin, total 0.2 0.2 - 1.0 MG/DL    ALT (SGPT) 38 13 - 56 U/L    AST (SGOT) 22 15 - 37 U/L    Alk. phosphatase 93 45 - 117 U/L    Protein, total 7.4 6.4 - 8.2 g/dL    Albumin 4.2 3.4 - 5.0 g/dL    Globulin 3.2 2.0 - 4.0 g/dL    A-G Ratio 1.3 0.8 - 1.7         Radiologic Studies -   CT ABD PELV WO CONT   Final Result   IMPRESSION:     Left renal edema secondary to a 3 mm proximal ureteral calculus  Right intrarenal calculus  Diverticulosis without diverticulitis         Medical Decision Making   I am the first provider for this patient. I reviewed the vital signs, available nursing notes, past medical history, past surgical history, family history and social history. Vital Signs-Reviewed the patient's vital signs. Pulse Oximetry Analysis -  99% on room air (Interpretation)    Records Reviewed: Nursing Notes and Triage notes (Time of Review: 4:54 AM)    ED Course: Progress Notes, Reevaluation, and Consults:          Diagnosis     Clinical Impression:   1. Renal colic    2. Urinary tract infection without hematuria, site unspecified        Disposition: Discharged     Follow-up Information     Follow up With Details Comments Contact Info    Nick Welsh MD Call in 1 day  Edwina Weeks [phone]             Patient's Medications   Start Taking    ONDANSETRON HCL (ZOFRAN) 4 MG TABLET    Take 1 Tab by mouth every eight (8) hours as needed for Nausea. OXYCODONE-ACETAMINOPHEN (PERCOCET) 5-325 MG PER TABLET    Take 1 Tab by mouth every four (4) hours as needed for Pain. Max Daily Amount: 6 Tabs. TAMSULOSIN (FLOMAX) 0.4 MG CAPSULE    Take 1 Cap by mouth daily for 15 days. TRIMETHOPRIM-SULFAMETHOXAZOLE (BACTRIM DS) 160-800 MG PER TABLET    Take 1 Tab by mouth two (2) times a day for 3 days.    Continue Taking    ALBUTEROL (PROVENTIL HFA, VENTOLIN HFA, PROAIR HFA) 90 MCG/ACTUATION INHALER    Take 1 Puff by inhalation every four (4) hours as needed for Wheezing. ALUMINUM HYDROX-MAGNESIUM CARB (GAVISCON EXTRA STRENGTH) 160-105 MG CHEW    Take 2 Tabs by mouth as needed. CYCLOSPORINE (RESTASIS) 0.05 % OPHTHALMIC EMULSION    Administer 1 Drop to both eyes two (2) times a day. DEXLANSOPRAZOLE (DEXILANT) 60 MG CPDB    Take  by mouth Daily (before breakfast). FLAXSEED OIL (OMEGA 3 PO)    Take  by mouth. FUROSEMIDE (LASIX) 20 MG TABLET    Take 1 Tab by mouth daily as needed. LABETALOL (NORMODYNE) 200 MG TABLET    TAKE ONE TABLET BY MOUTH 2 TIMES A DAY    LETROZOLE (FEMARA) 2.5 MG TABLET    Take 2.5 mg by mouth daily. LOSARTAN (COZAAR) 100 MG TABLET    Take 1 Tab by mouth daily. These Medications have changed    No medications on file   Stop Taking    ACETAMINOPHEN (TYLENOL EXTRA STRENGTH) 500 MG TABLET    Take 1,000 mg by mouth every six (6) hours as needed. _______________________________    Attestations:  Scribe Attestation     Rhea Milner acting as a scribe for and in the presence of Cleo Cobb MD      August 10, 2018 at 4:54 AM       Provider Attestation:      I personally performed the services described in the documentation, reviewed the documentation, as recorded by the scribe in my presence, and it accurately and completely records my words and actions.  August 10, 2018 at 4:54 AM - Lazaro Warren MD    ___________________________  ____

## 2018-08-10 NOTE — PATIENT INSTRUCTIONS
Kidney Stone: Care Instructions  Your Care Instructions    Kidney stones are formed when salts, minerals, and other substances normally found in the urine clump together. They can be as small as grains of sand or, rarely, as large as golf balls. While the stone is traveling through the ureter, which is the tube that carries urine from the kidney to the bladder, you will probably feel pain. The pain may be mild or very severe. You may also have some blood in your urine. As soon as the stone reaches the bladder, any intense pain should go away. If a stone is too large to pass on its own, you may need a medical procedure to help you pass the stone. The doctor has checked you carefully, but problems can develop later. If you notice any problems or new symptoms, get medical treatment right away. Follow-up care is a key part of your treatment and safety. Be sure to make and go to all appointments, and call your doctor if you are having problems. It's also a good idea to know your test results and keep a list of the medicines you take. How can you care for yourself at home? · Drink plenty of fluids, enough so that your urine is light yellow or clear like water. If you have kidney, heart, or liver disease and have to limit fluids, talk with your doctor before you increase the amount of fluids you drink. · Take pain medicines exactly as directed. Call your doctor if you think you are having a problem with your medicine. ¨ If the doctor gave you a prescription medicine for pain, take it as prescribed. ¨ If you are not taking a prescription pain medicine, ask your doctor if you can take an over-the-counter medicine. Read and follow all instructions on the label. · Your doctor may ask you to strain your urine so that you can collect your kidney stone when it passes. You can use a kitchen strainer or a tea strainer to catch the stone. Store it in a plastic bag until you see your doctor again.   Preventing future kidney stones  Some changes in your diet may help prevent kidney stones. Depending on the cause of your stones, your doctor may recommend that you:  · Drink plenty of fluids, enough so that your urine is light yellow or clear like water. If you have kidney, heart, or liver disease and have to limit fluids, talk with your doctor before you increase the amount of fluids you drink. · Limit coffee, tea, and alcohol. Also avoid grapefruit juice. · Do not take more than the recommended daily dose of vitamins C and D.  · Avoid antacids such as Gaviscon, Maalox, Mylanta, or Tums. · Limit the amount of salt (sodium) in your diet. · Eat a balanced diet that is not too high in protein. · Limit foods that are high in a substance called oxalate, which can cause kidney stones. These foods include dark green vegetables, rhubarb, chocolate, wheat bran, nuts, cranberries, and beans. When should you call for help? Call your doctor now or seek immediate medical care if:    · You cannot keep down fluids.     · Your pain gets worse.     · You have a fever or chills.     · You have new or worse pain in your back just below your rib cage (the flank area).     · You have new or more blood in your urine.    Watch closely for changes in your health, and be sure to contact your doctor if:    · You do not get better as expected. Where can you learn more? Go to http://christina-jose de jesus.info/. Enter P138 in the search box to learn more about \"Kidney Stone: Care Instructions. \"  Current as of: May 12, 2017  Content Version: 11.7  © 9626-1240 [employer]. Care instructions adapted under license by [employer] (which disclaims liability or warranty for this information). If you have questions about a medical condition or this instruction, always ask your healthcare professional. [address] any warranty or liability for your use of this information.

## 2018-08-10 NOTE — PROGRESS NOTES
Reda Needle    Chief Complaint   Patient presents with    New Patient    Kidney Stone       History and Physical    The patient is a very pleasant 60-year-old female  with a long history of kidney stones. She has passed a significant number of stones. She developed classic symptoms within the past couple of days and a CT scan showed a 4 mm stone in the left proximal ureter. There are no other stones on the left side. On the right side she has a right lower pole stone that is nonobstructing but is about 1 cm in size. There are no other calculi    The patient has received medication from her primary care physician but comes now for us to review everything to make sure that we have a plan for surveillance. She is not having uncontrollable pain and she is not having any nausea or vomiting and has not had a fever or dysuria    Past Medical History:   Diagnosis Date    Acute reaction to stress     Closed fracture of left wrist     Diastolic dysfunction     Fatty liver     GERD (gastroesophageal reflux disease)     Hyperlipidemia     Hypertension     diastolic dysfunction    Hypoglycemia     Kidney stones     11/16 for yrs; spon passage always so far    RSD (reflex sympathetic dystrophy) 8/2009    no blood pressure or sticks in left arm    Syncope 2000    no recurrence since; was orthostatic at that time    Unspecified adverse effect of anesthesia     hard to sedate     Patient Active Problem List   Diagnosis Code    Special screening for malignant neoplasms, colon Z12.11    Rogers's esophagus K22.70    Other dysphagia R13.19    Acute reaction to stress F43.0    Essential hypertension I10    Pure hypercholesterolemia E78.00    Obesity (BMI 30.0-34. 9) E66.9    Acute diastolic CHF (congestive heart failure) (HCC) I50.31    Chronic diastolic congestive heart failure (HCC) I50.32    Personal history of breast cancer Z85.3    Breast cancer of upper-outer quadrant of right female breast ([facility] C50.411     Past Surgical History:   Procedure Laterality Date    HX CARPAL TUNNEL RELEASE      HX GYN      lap. fibroid removal    HX HEART CATHETERIZATION  2005 approx    normal coronaries per pt    HX MASTECTOMY Right 11/30/2017    RIGHT BREAST LUMPECTOMY WITH NEEDLE LOCALIZATION Lawrence Pedlar LYMPH NODE BIOPSY  performed by Alejandra Arredondo MD at [address] [place] [address] HX ORTHOPAEDIC      ORIF left wrist    HX SHOULDER ARTHROSCOPY Left 1988     Current Outpatient Prescriptions   Medication Sig Dispense Refill    cholecalciferol, VITAMIN D3, (VITAMIN D3) 5,000 unit tab tablet Take  by mouth daily.  acetaminophen/diphenhydramine (TYLENOL PM PO) Take  by mouth.  labetalol (NORMODYNE) 200 mg tablet TAKE ONE TABLET BY MOUTH 2 TIMES A  Tab 2    losartan (COZAAR) 100 mg tablet Take 1 Tab by mouth daily. 90 Tab 2    furosemide (LASIX) 20 mg tablet Take 1 Tab by mouth daily as needed. 30 Tab 2    letrozole (FEMARA) 2.5 mg tablet Take 2.5 mg by mouth daily.  FLAXSEED OIL (OMEGA 3 PO) Take  by mouth.  Dexlansoprazole (DEXILANT) 60 mg CpDB Take  by mouth Daily (before breakfast).  cycloSPORINE (RESTASIS) 0.05 % ophthalmic emulsion Administer 1 Drop to both eyes two (2) times a day.  trimethoprim-sulfamethoxazole (BACTRIM DS) 160-800 mg per tablet Take 1 Tab by mouth two (2) times a day for 3 days. 6 Tab 0    ondansetron hcl (ZOFRAN) 4 mg tablet Take 1 Tab by mouth every eight (8) hours as needed for Nausea. 10 Tab 0    tamsulosin (FLOMAX) 0.4 mg capsule Take 1 Cap by mouth daily for 15 days. 15 Cap 0    oxyCODONE-acetaminophen (PERCOCET) 5-325 mg per tablet Take 1 Tab by mouth every four (4) hours as needed for Pain. Max Daily Amount: 6 Tabs. 10 Tab 0    albuterol (PROVENTIL HFA, VENTOLIN HFA, PROAIR HFA) 90 mcg/actuation inhaler Take 1 Puff by inhalation every four (4) hours as needed for Wheezing.  1 Inhaler 5    aluminum hydrox-magnesium carb (GAVISCON EXTRA STRENGTH) 160-105 mg chew Take 2 Tabs by mouth as needed. Allergies   Allergen Reactions    Lipitor [Atorvastatin] Myalgia     Quit 2/17    Nsaids (Non-Steroidal Anti-Inflammatory Drug) Other (comments)     Severe abdominal pain    Pcn [Penicillins] Swelling     Swelling, hives & photosensitive rxn    Statins-Hmg-Coa Reductase Inhibitors Other (comments)     Lag crumps     Social History     Social History    Marital status:      Spouse name: N/A    Number of children: N/A    Years of education: N/A     Occupational History    Not on file. Social History Main Topics    Smoking status: Former Smoker     Packs/day: 0.50     Years: 11.00     Quit date: 1/1/2003    Smokeless tobacco: Never Used    Alcohol use 0.6 oz/week     1 Glasses of wine per week      Comment: rarely- 3-4 drinks/yr    Drug use: No    Sexual activity: Not Currently     Other Topics Concern    Not on file     Social History Narrative      Family History   Problem Relation Age of Onset    Diabetes Maternal Aunt     Cancer Maternal Uncle      throat    Cancer Maternal Grandmother      melanoma    Heart Disease Other     Heart Attack Paternal Grandmother 48    Hypertension Mother     Cancer Maternal Grandfather      Melanoma    Stroke Neg Hx              Visit Vitals    BP 92/66 (BP 1 Location: Left arm, BP Patient Position: Sitting)    Pulse 79    Temp 98 °F (36.7 °C) (Oral)    Ht 5' 1\" (1.549 m)    Wt 183 lb (83 kg)    SpO2 95%    BMI 34.58 kg/m2     Physical        Gen: WDWN adult NAD  Head  : normocephalic,  Normal ROM; eyes without normal pupils, EOMs, no masses;  conjunctiva normal  Neck: normal movement,  no evident mass,  No evident adenopathy, trachea midline,    Flanks     -    Extremities- no edema, arthritis, deformity, swelling  Psych- oriented, no evident anxiety, no cognitive impairment evident    CT scan is reviewed and is as above.   There is only modest ectasia on the left side proximal to a stone that is about the middle of the proximal third. The urine was positive for blood and trace positive for leukocyte Estrace but the microscopic is pretty unremarkable  Review of the database reveals that the patient had a somewhat limited 24 hour urine determination done in 2010 which showed normal levels of urine calcium, urine uric acid, and citrate                  Impression/ PLAN  Left ureteral calculus with consistent symptoms in a patient with a long history of stone formation    Plan: We have discussed what medications to utilize. I believe she can hold onto the Bactrim and use it if she develops any irritative symptoms or fever. She is advised that she is to call us for uncontrollable pain uncontrollable vomiting or fever. Otherwise she will come back in 21 days and have a KUB just before that visit and we will monitor the progress of the stone      after that we might want to consider repeating a 24-hour determination of urine stone constituents            This visit exceeded 30 minutes and >50% was counselling  The patient understands the discussion and plan    PLEASE NOTE:      This document has been produced using voice recognition software.   Unrecognized errors in transcription may be present    Bong Jaramillo MD

## 2018-08-10 NOTE — ED NOTES
Patient discharge. She 3 mm left stone. She has a history of allergy to consented to give her GI upset. She was requesting more pain medication. She agreed to try a low-dose of Toradol. She is now feeling much better. She is actually has follow-up with urology today she is going to try to get a ureteral stent to the stone. We discussed return for fever or chills. She expressed understanding.     Bianca Vogel MD

## 2018-08-10 NOTE — DISCHARGE INSTRUCTIONS
Kidney Stone: Care Instructions  Your Care Instructions    Kidney stones are formed when salts, minerals, and other substances normally found in the urine clump together. They can be as small as grains of sand or, rarely, as large as golf balls. While the stone is traveling through the ureter, which is the tube that carries urine from the kidney to the bladder, you will probably feel pain. The pain may be mild or very severe. You may also have some blood in your urine. As soon as the stone reaches the bladder, any intense pain should go away. If a stone is too large to pass on its own, you may need a medical procedure to help you pass the stone. The doctor has checked you carefully, but problems can develop later. If you notice any problems or new symptoms, get medical treatment right away. Follow-up care is a key part of your treatment and safety. Be sure to make and go to all appointments, and call your doctor if you are having problems. It's also a good idea to know your test results and keep a list of the medicines you take. How can you care for yourself at home? · Drink plenty of fluids, enough so that your urine is light yellow or clear like water. If you have kidney, heart, or liver disease and have to limit fluids, talk with your doctor before you increase the amount of fluids you drink. · Take pain medicines exactly as directed. Call your doctor if you think you are having a problem with your medicine. ¨ If the doctor gave you a prescription medicine for pain, take it as prescribed. ¨ If you are not taking a prescription pain medicine, ask your doctor if you can take an over-the-counter medicine. Read and follow all instructions on the label. · Your doctor may ask you to strain your urine so that you can collect your kidney stone when it passes. You can use a kitchen strainer or a tea strainer to catch the stone. Store it in a plastic bag until you see your doctor again.   Preventing future kidney stones  Some changes in your diet may help prevent kidney stones. Depending on the cause of your stones, your doctor may recommend that you:  · Drink plenty of fluids, enough so that your urine is light yellow or clear like water. If you have kidney, heart, or liver disease and have to limit fluids, talk with your doctor before you increase the amount of fluids you drink. · Limit coffee, tea, and alcohol. Also avoid grapefruit juice. · Do not take more than the recommended daily dose of vitamins C and D.  · Avoid antacids such as Gaviscon, Maalox, Mylanta, or Tums. · Limit the amount of salt (sodium) in your diet. · Eat a balanced diet that is not too high in protein. · Limit foods that are high in a substance called oxalate, which can cause kidney stones. These foods include dark green vegetables, rhubarb, chocolate, wheat bran, nuts, cranberries, and beans. When should you call for help? Call your doctor now or seek immediate medical care if:    · You cannot keep down fluids.     · Your pain gets worse.     · You have a fever or chills.     · You have new or worse pain in your back just below your rib cage (the flank area).     · You have new or more blood in your urine.    Watch closely for changes in your health, and be sure to contact your doctor if:    · You do not get better as expected. Where can you learn more? Go to http://christina-jose de jesus.info/. Enter U301 in the search box to learn more about \"Kidney Stone: Care Instructions. \"  Current as of: May 12, 2017  Content Version: 11.7  © 3312-3881 [employer]api. Care instructions adapted under license by Timbre (which disclaims liability or warranty for this information). If you have questions about a medical condition or this instruction, always ask your healthcare professional. [address] any warranty or liability for your use of this information.

## 2018-08-10 NOTE — PROGRESS NOTES
Ms. Chai Zambrano has a reminder for a \"due or due soon\" health maintenance. I have asked that she contact her primary care provider for follow-up on this health maintenance. RBV per Dr. Leonides Goldberg an order was placed for patient to have a KUB done prior to next appointment.

## 2018-08-21 ENCOUNTER — HOSPITAL ENCOUNTER (OUTPATIENT)
Dept: LAB | Age: 68
Discharge: HOME | End: 2018-08-21
Payer: MEDICARE

## 2018-08-21 DIAGNOSIS — N20.0 KIDNEY STONE: Primary | ICD-10-CM

## 2018-08-21 DIAGNOSIS — N20.0 KIDNEY STONE: ICD-10-CM

## 2018-08-21 PROCEDURE — 82360 CALCULUS ASSAY QUANT: CPT | Performed by: UROLOGY

## 2018-08-24 ENCOUNTER — DOCUMENTATION ONLY (OUTPATIENT)
Dept: UROLOGY | Age: 68
End: 2018-08-24

## 2018-08-24 NOTE — PROGRESS NOTES
Patient called and said she passed her kidney stone and will bring in it for us to send away for analysis.

## 2018-08-28 LAB
CA PHOS MFR STONE: 10 %
CALCIUM OXALATE DIHYDRATE MFR STONE IR: 3 %
COLOR STONE: NORMAL
COM MFR STONE: 87 %
COMMENT, 519994: NORMAL
COMPOSITION, 120440: NORMAL
DISCLAIMER, STO32L: NORMAL
NIDUS STONE QL: NORMAL
SIZE STONE: NORMAL MM
WT STONE: 33.9 MG

## 2018-08-31 ENCOUNTER — HOSPITAL ENCOUNTER (OUTPATIENT)
Dept: GENERAL RADIOLOGY | Age: 68
Discharge: HOME | End: 2018-08-31
Payer: MEDICARE

## 2018-08-31 ENCOUNTER — OFFICE VISIT (OUTPATIENT)
Dept: UROLOGY | Age: 68
End: 2018-08-31

## 2018-08-31 VITALS
SYSTOLIC BLOOD PRESSURE: 137 MMHG | HEIGHT: 61 IN | DIASTOLIC BLOOD PRESSURE: 80 MMHG | HEART RATE: 89 BPM | BODY MASS INDEX: 34.55 KG/M2 | OXYGEN SATURATION: 98 % | WEIGHT: 183 LBS

## 2018-08-31 DIAGNOSIS — N20.0 KIDNEY STONE: Primary | ICD-10-CM

## 2018-08-31 DIAGNOSIS — N20.0 KIDNEY STONES: ICD-10-CM

## 2018-08-31 LAB
BILIRUB UR QL STRIP: NEGATIVE
GLUCOSE UR-MCNC: NEGATIVE MG/DL
KETONES P FAST UR STRIP-MCNC: NEGATIVE MG/DL
PH UR STRIP: 5.5 [PH] (ref 4.6–8)
PROT UR QL STRIP: NEGATIVE
SP GR UR STRIP: 1.01 (ref 1–1.03)
UA UROBILINOGEN AMB POC: NORMAL (ref 0.2–1)
URINALYSIS CLARITY POC: CLEAR
URINALYSIS COLOR POC: YELLOW
URINE BLOOD POC: NEGATIVE
URINE LEUKOCYTES POC: NORMAL
URINE NITRITES POC: NEGATIVE

## 2018-08-31 PROCEDURE — 74018 RADEX ABDOMEN 1 VIEW: CPT

## 2018-08-31 NOTE — PROGRESS NOTES
Ms. Diego Morejon has a reminder for a \"due or due soon\" health maintenance. I have asked that she contact her primary care provider for follow-up on this health maintenance.

## 2018-08-31 NOTE — PATIENT INSTRUCTIONS
Learning About Diet for Kidney Stone Prevention  What are kidney stones? Kidney stones are made of salts and minerals in the urine that form small \"bernard. \" Stones can form in the kidneys and the ureters (the tubes that lead from the kidneys to the bladder). They can also form in the bladder. Stones may not cause a problem as long as they stay in the kidneys. But they can cause sudden, severe pain. Pain is most likely when the stones travel from the kidneys to the bladder. Kidney stones can cause bloody urine. Kidney stones often run in families. You are more likely to get them if you don't drink enough fluids, mainly water. Certain foods and drinks and some dietary supplements may also increase your risk for kidney stones if you consume too much of them. What can you do to prevent kidney stones? Changing what you eat may not prevent all types of kidney stones. But for people who have a history of certain kinds of kidney stones, some changes in diet may help. A dietitian can help you set up a meal plan that includes healthy, low-oxalate choices. Here are some general guidelines to get you started. Plan your meals and snacks around foods that are low in oxalate. These foods include:  · Corn, kale, parsnips, and squash,. · Beef, chicken, pork, turkey, and fish. · Milk, butter, cheese, and yogurt. You can eat certain foods that are medium-high in oxalate, but eat them only once in a while. These foods include:  · Bread. · Brown rice. · English muffins. · Figs. · Popcorn. · String beans. · Tomatoes. Limit very high-oxalate foods, including:  · Black tea. · Coffee. · Chocolate. · Dark green vegetables. · Nuts. Here are some other things you can do to help prevent kidney stones. · Drink plenty of fluids. If you have kidney, heart, or liver disease and have to limit fluids, talk with your doctor before you increase the amount of fluids you drink.   · Do not take more than the recommended daily dose of vitamins C and D.  · Limit the salt in your diet. · Eat a balanced diet that is not too high in protein. Follow-up care is a key part of your treatment and safety. Be sure to make and go to all appointments, and call your doctor if you are having problems. It's also a good idea to know your test results and keep a list of the medicines you take. Where can you learn more? Go to http://christina-jose de jesus.info/. Enter C138 in the search box to learn more about \"Learning About Diet for Kidney Stone Prevention. \"  Current as of: May 12, 2017  Content Version: 11.7  © 0849-4880 [employer]on, [employer]. Care instructions adapted under license by RentFeeder (which disclaims liability or warranty for this information). If you have questions about a medical condition or this instruction, always ask your healthcare professional. [address] any warranty or liability for your use of this information.

## 2018-08-31 NOTE — PROGRESS NOTES
Chief Complaint   Patient presents with    Kidney Stone       HISTORY OF PRESENT ILLNESS:  Rosy Adams is a 80 y.o. female with a history of kidney stones going back to her early 25s and she has probably passed close to 500 stones over the intervening 40 some years. When she saw Dr. Zoey fischer, she was in the process of passing about a 3 mm stone out of the left ureter. An incidental finding was an almost 1 cm stone in the lower pole of the right kidney which was not giving her any symptoms. Since she was here last, she indeed passed the stone on the left and has brought it in for analysis. In reviewing her chart and talking to her in depth, she did have a metabolic workup for her stones back in 2010 which included a 24 hour urine for citrate, uric acid, calcium, and she also had a parathormone level which was normal back then. Her serum calcium levels have remained fairly low. She has never been on any type of medical regimen for her stone disease but on one occasion was told by 1 of her doctors to drink plenty of fluids and actually managed to drink herself into congestive heart failure.       Past Medical History:   Diagnosis Date    Acute reaction to stress     Closed fracture of left wrist     Diastolic dysfunction     Fatty liver     GERD (gastroesophageal reflux disease)     Hyperlipidemia     Hypertension     diastolic dysfunction    Hypoglycemia     Kidney stones     11/16 for yrs; spon passage always so far    RSD (reflex sympathetic dystrophy) 8/2009    no blood pressure or sticks in left arm    Syncope 2000    no recurrence since; was orthostatic at that time    Unspecified adverse effect of anesthesia     hard to sedate       Past Surgical History:   Procedure Laterality Date    HX CARPAL TUNNEL RELEASE      HX GYN      lap. fibroid removal    HX HEART CATHETERIZATION  2005 approx    normal coronaries per pt    HX MASTECTOMY Right 11/30/2017    RIGHT BREAST LUMPECTOMY WITH NEEDLE LOCALIZATION Fernando Chew LYMPH NODE BIOPSY  performed by Deng Tavares MD at [address] HX ORTHOPAEDIC      ORIF left wrist    HX SHOULDER ARTHROSCOPY Left 1988       Social History   Substance Use Topics    Smoking status: Former Smoker     Packs/day: 0.50     Years: 11.00     Quit date: 1/1/2003    Smokeless tobacco: Never Used    Alcohol use 0.6 oz/week     1 Glasses of wine per week      Comment: rarely- 3-4 drinks/yr       Allergies   Allergen Reactions    Lipitor [Atorvastatin] Myalgia     Quit 2/17    Nsaids (Non-Steroidal Anti-Inflammatory Drug) Other (comments)     Severe abdominal pain    Pcn [Penicillins] Swelling     Swelling, hives & photosensitive rxn    Statins-Hmg-Coa Reductase Inhibitors Other (comments)     Lag crumps       Family History   Problem Relation Age of Onset    Diabetes Maternal Aunt     Cancer Maternal Uncle      throat    Cancer Maternal Grandmother      melanoma    Heart Disease Other     Heart Attack Paternal Grandmother 48    Hypertension Mother     Cancer Maternal Grandfather      Melanoma    Stroke Neg Hx        Current Outpatient Prescriptions   Medication Sig Dispense Refill    cholecalciferol, VITAMIN D3, (VITAMIN D3) 5,000 unit tab tablet Take  by mouth daily.  acetaminophen/diphenhydramine (TYLENOL PM PO) Take  by mouth.  labetalol (NORMODYNE) 200 mg tablet TAKE ONE TABLET BY MOUTH 2 TIMES A  Tab 2    losartan (COZAAR) 100 mg tablet Take 1 Tab by mouth daily. 90 Tab 2    furosemide (LASIX) 20 mg tablet Take 1 Tab by mouth daily as needed. 30 Tab 2    letrozole (FEMARA) 2.5 mg tablet Take 2.5 mg by mouth daily.  FLAXSEED OIL (OMEGA 3 PO) Take  by mouth.  Dexlansoprazole (DEXILANT) 60 mg CpDB Take  by mouth Daily (before breakfast).  cycloSPORINE (RESTASIS) 0.05 % ophthalmic emulsion Administer 1 Drop to both eyes two (2) times a day.       ondansetron hcl (ZOFRAN) 4 mg tablet Take 1 Tab by mouth every eight (8) hours as needed for Nausea. 10 Tab 0    oxyCODONE-acetaminophen (PERCOCET) 5-325 mg per tablet Take 1 Tab by mouth every four (4) hours as needed for Pain. Max Daily Amount: 6 Tabs. 10 Tab 0    albuterol (PROVENTIL HFA, VENTOLIN HFA, PROAIR HFA) 90 mcg/actuation inhaler Take 1 Puff by inhalation every four (4) hours as needed for Wheezing. 1 Inhaler 5    aluminum hydrox-magnesium carb (GAVISCON EXTRA STRENGTH) 160-105 mg chew Take 2 Tabs by mouth as needed. REVIEW OF SYSTEMS:   Documented on the chart      PHYSICAL EXAMINATION:     Visit Vitals    /80 (BP 1 Location: Right arm, BP Patient Position: Standing)    Pulse 89    Ht 5' 1\" (1.549 m)    Wt 183 lb (83 kg)    SpO2 98%    BMI 34.58 kg/m2     Constitutional: Well developed, well-nourished female in no acute distress. CV:  No peripheral swelling noted  Respiratory: No respiratory distress or difficulties  Abdomen:  Soft and nontender. No masses. No hepatosplenomegaly.  Female:  No CVA tenderness. Skin:  Normal color. No evidence of jaundice. Neuro/Psych:  Patient with appropriate affect. Alert and oriented. Lymphatic:   No enlargement of supraclavicular lymph nodes. Results for orders placed or performed in visit on 08/31/18   AMB POC URINALYSIS DIP STICK AUTO W/O MICRO   Result Value Ref Range    Color (UA POC) Yellow     Clarity (UA POC) Clear     Glucose (UA POC) Negative Negative    Bilirubin (UA POC) Negative Negative    Ketones (UA POC) Negative Negative    Specific gravity (UA POC) 1.010 1.001 - 1.035    Blood (UA POC) Negative Negative    pH (UA POC) 5.5 4.6 - 8.0    Protein (UA POC) Negative Negative    Urobilinogen (UA POC) 0.2 mg/dL 0.2 - 1    Nitrites (UA POC) Negative Negative    Leukocyte esterase (UA POC) 1+ Negative         REVIEW OF LABS AND IMAGING:      Imaging Report Reviewed? YES      Images Reviewed? YES           Other Lab Data Reviewed?    YES    ASSESSMENT:     ICD-10-CM ICD-9-CM 1. Kidney stone N20.0 592.0 AMB POC URINALYSIS DIP STICK AUTO W/O MICRO                PLAN/DISCUSSION: I have reviewed both her CT scan which was performed earlier this year and have looked at her last KUB. She is perfectly comfortable now. We discussed the use of concentrated lemonade force elevation of urinary citrate in the treatment of stones. I urged her to try it at least daily for a year and see if that had any change at all on her production of stones. She states he usually passes about 1 a month and surprisingly has never had to have instrumentation or lithotripsy for any of these stones. The stone in the lower pole of the right kidney, in my opinion, can be left alone for now. I did encourage her to have a parathormone drawn the next time she sees her primary care physician. In the meantime, we will see her back when she has another stone. Patient voices understanding and agreement to the plan. Jeremiah Gibbons MD on 8/31/2018           Please note: This document has been produced using voice recognition software. Unrecognized errors in transcription may be present.

## 2018-08-31 NOTE — MR AVS SNAPSHOT
[address] [facility] [address] [address] 
[phone] Patient: Otilia Sawyer MRN: [id number] MIX:[id number] Visit Information Date & Time Provider Department Dept. Phone Encounter #  
 8/31/2018 10:00 AM Jose M Hernandez, [address] [facility] [phone] [id number] Your Appointments 1/4/2019  9:00 AM  
Office Visit with Lei Lopez MD  
[facility]) Appt Note: 6 months post labs [address] [facility], [address] [facility] [address]  
[address], [address] [facility]  
  
    
 2/8/2019  9:45 AM  
Follow Up with Rachana Samaniego MD  
[facility]) Appt Note: 6 MONTH FU  
 [address] [facility] [address] [facility] [address]  
[phone]  
  
   
 [address] [facility] [address] [facility] [address] Upcoming Health Maintenance Date Due Hepatitis C Screening 1950 DTaP/Tdap/Td series (1 - Tdap) 8/25/1972 ZOSTER VACCINE AGE 60> 6/25/2011 GLAUCOMA SCREENING Q2Y 8/25/2016 Pneumococcal 65+ High/Highest Risk (1 of 2 - PCV13) 8/25/2016 MEDICARE YEARLY EXAM 3/28/2018 Influenza Age 5 to Adult 8/1/2018 BREAST CANCER SCRN MAMMOGRAM 8/1/2020 COLONOSCOPY 10/17/2022 Allergies as of 8/31/2018  Review Complete On: 8/31/2018 By: Nikos Cortez LPN Severity Noted Reaction Type Reactions Lipitor [Atorvastatin]  03/01/2017    Myalgia Quit 2/17 Nsaids (Non-steroidal Anti-inflammatory Drug)  10/17/2012    Other (comments) Severe abdominal pain  
 Pcn [Penicillins]  10/17/2012    Swelling Swelling, hives & photosensitive rxn Statins-hmg-coa Reductase Inhibitors  08/10/2018    Other (comments) Lag crumps Current Immunizations  Reviewed on 10/28/2016 Name Date Influenza Vaccine 9/19/2016 Poliovirus vaccine 5/14/1998 Not reviewed this visit You Were Diagnosed With   
  
 Codes Comments Kidney stone    -  Primary ICD-10-CM: N20.0 ICD-9-CM: 592.0 Vitals BP Pulse Height(growth percentile) Weight(growth percentile) SpO2 BMI  
 137/80 (BP 1 Location: Right arm, BP Patient Position: Standing) 89 5' 1\" (1.549 m) 183 lb (83 kg) 98% 34.58 kg/m2 OB Status Smoking Status Postmenopausal Former Smoker Vitals History BMI and BSA Data Body Mass Index Body Surface Area 34.58 kg/m 2 1.89 m 2 Preferred Pharmacy Pharmacy Name Phone [facility] [address] [place] [address] [phone] Your Updated Medication List  
  
   
This list is accurate as of 8/31/18 10:36 AM.  Always use your most recent med list.  
  
  
  
  
 albuterol 90 mcg/actuation inhaler Commonly known as:  PROVENTIL HFA, VENTOLIN HFA, PROAIR HFA Take 1 Puff by inhalation every four (4) hours as needed for Wheezing. cholecalciferol (VITAMIN D3) 5,000 unit Tab tablet Commonly known as:  VITAMIN D3 Take  by mouth daily. DEXILANT 60 mg Cpdb Generic drug:  Dexlansoprazole Take  by mouth Daily (before breakfast). FEMARA 2.5 mg tablet Generic drug:  letrozole Take 2.5 mg by mouth daily. furosemide 20 mg tablet Commonly known as:  LASIX Take 1 Tab by mouth daily as needed. GAVISCON EXTRA STRENGTH 160-105 mg Wannetta Bimler Generic drug:  aluminum hydrox-magnesium carb Take 2 Tabs by mouth as needed. labetalol 200 mg tablet Commonly known as:  NORMODYNE  
TAKE ONE TABLET BY MOUTH 2 TIMES A DAY  
  
 losartan 100 mg tablet Commonly known as:  COZAAR Take 1 Tab by mouth daily. OMEGA 3 PO Take  by mouth. ondansetron hcl 4 mg tablet Commonly known as:  Lurlean Precise Take 1 Tab by mouth every eight (8) hours as needed for Nausea. oxyCODONE-acetaminophen 5-325 mg per tablet Commonly known as:  PERCOCET Take 1 Tab by mouth every four (4) hours as needed for Pain. Max Daily Amount: 6 Tabs. RESTASIS 0.05 % ophthalmic emulsion Generic drug:  cycloSPORINE Administer 1 Drop to both eyes two (2) times a day. TYLENOL PM PO Take  by mouth. We Performed the Following AMB POC URINALYSIS DIP STICK AUTO W/O MICRO [28698 CPT(R)] Patient Instructions Learning About Diet for Kidney Stone Prevention What are kidney stones? Kidney stones are made of salts and minerals in the urine that form small \"bernard. \" Stones can form in the kidneys and the ureters (the tubes that lead from the kidneys to the bladder). They can also form in the bladder. Stones may not cause a problem as long as they stay in the kidneys. But they can cause sudden, severe pain. Pain is most likely when the stones travel from the kidneys to the bladder. Kidney stones can cause bloody urine. Kidney stones often run in families. You are more likely to get them if you don't drink enough fluids, mainly water. Certain foods and drinks and some dietary supplements may also increase your risk for kidney stones if you consume too much of them. What can you do to prevent kidney stones? Changing what you eat may not prevent all types of kidney stones. But for people who have a history of certain kinds of kidney stones, some changes in diet may help. A dietitian can help you set up a meal plan that includes healthy, low-oxalate choices. Here are some general guidelines to get you started. Plan your meals and snacks around foods that are low in oxalate. These foods include: · Corn, kale, parsnips, and squash,. · Beef, chicken, pork, turkey, and fish. · Milk, butter, cheese, and yogurt. You can eat certain foods that are medium-high in oxalate, but eat them only once in a while. These foods include: · Bread. · Brown rice. · English muffins. · Figs. · Popcorn. · String beans. · Tomatoes. Limit very high-oxalate foods, including: · Black tea. · Coffee. · Chocolate. · Dark green vegetables. · Nuts. Here are some other things you can do to help prevent kidney stones. · Drink plenty of fluids. If you have kidney, heart, or liver disease and have to limit fluids, talk with your doctor before you increase the amount of fluids you drink. · Do not take more than the recommended daily dose of vitamins C and D. 
· Limit the salt in your diet. · Eat a balanced diet that is not too high in protein. Follow-up care is a key part of your treatment and safety. Be sure to make and go to all appointments, and call your doctor if you are having problems. It's also a good idea to know your test results and keep a list of the medicines you take. Where can you learn more? Go to http://christina-jose de jesus.info/. Enter C138 in the search box to learn more about \"Learning About Diet for Kidney Stone Prevention. \" Current as of: May 12, 2017 Content Version: 11.7 © 1171-5685 Spanning Cloud Apps. Care instructions adapted under license by Morningstar (which disclaims liability or warranty for this information). If you have questions about a medical condition or this instruction, always ask your healthcare professional. [address] any warranty or liability for your use of this information. Introducing [facility]! Dear Ministerio Jorgensen: Thank you for requesting a KDPOF account. Our records indicate that you already have an active KDPOF account. You can access your account anytime at [email or web address]. [email or web address] Did you know that you can access your hospital and ER discharge instructions at any time in KDPOF? You can also review all of your test results from your hospital stay or ER visit. Additional Information If you have questions, please visit the Frequently Asked Questions section of the [employer] website at [email or web address] Mobile Digital Media. Chronicle [email or web address] Remember, [employer] is NOT to be used for urgent needs. For medical emergencies, dial 911. Now available from your iPhone and Android! Please provide this summary of care documentation to your next provider. Your primary care clinician is listed as Erick Lack. If you have any questions after today's visit, please call [phone].

## 2018-10-07 DIAGNOSIS — I50.32 CHRONIC DIASTOLIC CONGESTIVE HEART FAILURE (HCC): ICD-10-CM

## 2018-10-09 RX ORDER — FUROSEMIDE 20 MG/1
TABLET ORAL
Qty: 30 TAB | Refills: 0 | Status: SHIPPED | OUTPATIENT
Start: 2018-10-09 | End: 2018-11-10 | Stop reason: SDUPTHER

## 2018-11-10 DIAGNOSIS — I50.32 CHRONIC DIASTOLIC CONGESTIVE HEART FAILURE (HCC): ICD-10-CM

## 2018-11-10 RX ORDER — FUROSEMIDE 20 MG/1
TABLET ORAL
Qty: 30 TAB | Refills: 0 | Status: SHIPPED | OUTPATIENT
Start: 2018-11-10 | End: 2022-05-06

## 2019-01-07 ENCOUNTER — CLINICAL SUPPORT (OUTPATIENT)
Dept: INTERNAL MEDICINE CLINIC | Age: 69
End: 2019-01-07

## 2019-01-07 DIAGNOSIS — Z23 ENCOUNTER FOR IMMUNIZATION: Primary | ICD-10-CM

## 2019-01-11 ENCOUNTER — TELEPHONE (OUTPATIENT)
Dept: CARDIOLOGY CLINIC | Age: 69
End: 2019-01-11

## 2019-01-11 DIAGNOSIS — E78.00 PURE HYPERCHOLESTEROLEMIA: Primary | ICD-10-CM

## 2019-01-11 DIAGNOSIS — I50.23 SYSTOLIC CHF, ACUTE ON CHRONIC (HCC): ICD-10-CM

## 2019-01-15 ENCOUNTER — HOSPITAL ENCOUNTER (OUTPATIENT)
Dept: GENERAL RADIOLOGY | Age: 69
Discharge: HOME | End: 2019-01-15
Payer: MEDICARE

## 2019-01-15 DIAGNOSIS — R07.81 RIB PAIN: ICD-10-CM

## 2019-01-15 DIAGNOSIS — C50.919 BREAST CANCER, FEMALE (HCC): ICD-10-CM

## 2019-01-15 PROCEDURE — 71046 X-RAY EXAM CHEST 2 VIEWS: CPT

## 2019-01-15 PROCEDURE — 71100 X-RAY EXAM RIBS UNI 2 VIEWS: CPT

## 2019-01-31 ENCOUNTER — HOSPITAL ENCOUNTER (OUTPATIENT)
Dept: LAB | Age: 69
Discharge: HOME | End: 2019-01-31
Payer: MEDICARE

## 2019-01-31 ENCOUNTER — TELEPHONE (OUTPATIENT)
Dept: CARDIOLOGY CLINIC | Age: 69
End: 2019-01-31

## 2019-01-31 DIAGNOSIS — E78.00 PURE HYPERCHOLESTEROLEMIA: ICD-10-CM

## 2019-01-31 DIAGNOSIS — I50.23 SYSTOLIC CHF, ACUTE ON CHRONIC (HCC): ICD-10-CM

## 2019-01-31 LAB
ALBUMIN SERPL-MCNC: 3.7 G/DL (ref 3.4–5)
ALBUMIN/GLOB SERPL: 1.3 {RATIO} (ref 0.8–1.7)
ALP SERPL-CCNC: 73 U/L (ref 45–117)
ALT SERPL-CCNC: 25 U/L (ref 13–56)
AST SERPL-CCNC: 17 U/L (ref 15–37)
BILIRUB DIRECT SERPL-MCNC: <0.1 MG/DL (ref 0–0.2)
BILIRUB SERPL-MCNC: 0.2 MG/DL (ref 0.2–1)
CHOLEST SERPL-MCNC: 233 MG/DL
GLOBULIN SER CALC-MCNC: 2.8 G/DL (ref 2–4)
HDLC SERPL-MCNC: 56 MG/DL (ref 40–60)
HDLC SERPL: 4.2 {RATIO} (ref 0–5)
LDLC SERPL CALC-MCNC: 152.2 MG/DL (ref 0–100)
LIPID PROFILE,FLP: ABNORMAL
PROT SERPL-MCNC: 6.5 G/DL (ref 6.4–8.2)
TRIGL SERPL-MCNC: 124 MG/DL (ref ?–150)
VLDLC SERPL CALC-MCNC: 24.8 MG/DL

## 2019-01-31 PROCEDURE — 80061 LIPID PANEL: CPT

## 2019-01-31 PROCEDURE — 80076 HEPATIC FUNCTION PANEL: CPT

## 2019-01-31 PROCEDURE — 36415 COLL VENOUS BLD VENIPUNCTURE: CPT

## 2019-01-31 NOTE — TELEPHONE ENCOUNTER
Spoken with patient in concern to lab results and medications. Patient stated she currently is not taking a statin on the regular basis due to intolerance, but does take one tablet once or twice a week. Spoke with patient on diet and exercise. Patient stated she will bring medications/ list in for her appointment tomorrow. Reminded to call the office if any questions or concerns.

## 2019-02-01 ENCOUNTER — OFFICE VISIT (OUTPATIENT)
Dept: CARDIOLOGY CLINIC | Age: 69
End: 2019-02-01

## 2019-02-01 VITALS
DIASTOLIC BLOOD PRESSURE: 70 MMHG | HEIGHT: 61 IN | SYSTOLIC BLOOD PRESSURE: 124 MMHG | HEART RATE: 76 BPM | WEIGHT: 182 LBS | BODY MASS INDEX: 34.36 KG/M2

## 2019-02-01 DIAGNOSIS — E66.9 OBESITY (BMI 30.0-34.9): ICD-10-CM

## 2019-02-01 DIAGNOSIS — I50.32 CHRONIC DIASTOLIC CONGESTIVE HEART FAILURE (HCC): Primary | ICD-10-CM

## 2019-02-01 DIAGNOSIS — E78.00 PURE HYPERCHOLESTEROLEMIA: ICD-10-CM

## 2019-02-01 DIAGNOSIS — I10 ESSENTIAL HYPERTENSION: ICD-10-CM

## 2019-02-01 RX ORDER — ROSUVASTATIN CALCIUM 5 MG/1
5 TABLET, COATED ORAL
Qty: 30 TAB | Refills: 3 | Status: SHIPPED | OUTPATIENT
Start: 2019-02-01 | End: 2019-05-24

## 2019-02-01 RX ORDER — ACETAMINOPHEN 325 MG/1
TABLET ORAL
COMMUNITY

## 2019-02-01 NOTE — LETTER
Jennifer Lobo 1950 2/1/2019 Dear Grace Mars MD 
 
I had the pleasure of evaluating  Ms. Nogueira Overall in office today. Below are the relevant portions of my assessment and plan of care. ICD-10-CM ICD-9-CM 1. Chronic diastolic congestive heart failure (HCC) I50.32 428.32 AMB POC EKG ROUTINE W/ 12 LEADS, INTER & REP  
  428.0 2. Essential hypertension I10 401.9 3. Pure hypercholesterolemia E78.00 272.0 rosuvastatin (CRESTOR) 5 mg tablet LIPID PANEL  
   HEPATIC FUNCTION PANEL  
4. Obesity (BMI 30.0-34. 9) E66.9 278.00 Current Outpatient Medications Medication Sig Dispense Refill  acetaminophen (TYLENOL) 325 mg tablet Take  by mouth every four (4) hours as needed for Pain.  rosuvastatin (CRESTOR) 5 mg tablet Take 1 Tab by mouth nightly. 30 Tab 3  furosemide (LASIX) 20 mg tablet TAKE 1 TABLET BY MOUTH DAILY AS NEEDED 30 Tab 0  cholecalciferol, VITAMIN D3, (VITAMIN D3) 5,000 unit tab tablet Take  by mouth daily.  labetalol (NORMODYNE) 200 mg tablet TAKE ONE TABLET BY MOUTH 2 TIMES A  Tab 2  
 losartan (COZAAR) 100 mg tablet Take 1 Tab by mouth daily. 90 Tab 2  
 letrozole (FEMARA) 2.5 mg tablet Take 2.5 mg by mouth daily.  albuterol (PROVENTIL HFA, VENTOLIN HFA, PROAIR HFA) 90 mcg/actuation inhaler Take 1 Puff by inhalation every four (4) hours as needed for Wheezing. 1 Inhaler 5  
 Dexlansoprazole (DEXILANT) 60 mg CpDB Take  by mouth Daily (before breakfast).  cycloSPORINE (RESTASIS) 0.05 % ophthalmic emulsion Administer 1 Drop to both eyes two (2) times a day.  aluminum hydrox-magnesium carb (GAVISCON EXTRA STRENGTH) 160-105 mg chew Take 2 Tabs by mouth as needed. Orders Placed This Encounter  LIPID PANEL Standing Status:   Future Standing Expiration Date:   8/4/2019  
 HEPATIC FUNCTION PANEL Standing Status:   Future Standing Expiration Date:   8/4/2019  AMB POC EKG ROUTINE W/ 12 LEADS, INTER & REP  
 Order Specific Question:   Reason for Exam: Answer:   palpitations  acetaminophen (TYLENOL) 325 mg tablet Sig: Take  by mouth every four (4) hours as needed for Pain.  rosuvastatin (CRESTOR) 5 mg tablet Sig: Take 1 Tab by mouth nightly. Dispense:  30 Tab Refill:  3 If you have questions, please do not hesitate to call me. I look forward to following Ms. Beatrice Myrick along with you. Sincerely, Duc Mast MD

## 2019-02-01 NOTE — PATIENT INSTRUCTIONS
Medications Discontinued During This Encounter   Medication Reason    ondansetron hcl (ZOFRAN) 4 mg tablet     oxyCODONE-acetaminophen (PERCOCET) 5-325 mg per tablet     acetaminophen/diphenhydramine (TYLENOL PM PO)     FLAXSEED OIL (OMEGA 3 PO)           Body Mass Index: Care Instructions  Your Care Instructions    Body mass index (BMI) can help you see if your weight is raising your risk for health problems. It uses a formula to compare how much you weigh with how tall you are. · A BMI lower than 18.5 is considered underweight. · A BMI between 18.5 and 24.9 is considered healthy. · A BMI between 25 and 29.9 is considered overweight. A BMI of 30 or higher is considered obese. If your BMI is in the normal range, it means that you have a lower risk for weight-related health problems. If your BMI is in the overweight or obese range, you may be at increased risk for weight-related health problems, such as high blood pressure, heart disease, stroke, arthritis or joint pain, and diabetes. If your BMI is in the underweight range, you may be at increased risk for health problems such as fatigue, lower protection (immunity) against illness, muscle loss, bone loss, hair loss, and hormone problems. BMI is just one measure of your risk for weight-related health problems. You may be at higher risk for health problems if you are not active, you eat an unhealthy diet, or you drink too much alcohol or use tobacco products. Follow-up care is a key part of your treatment and safety. Be sure to make and go to all appointments, and call your doctor if you are having problems. It's also a good idea to know your test results and keep a list of the medicines you take. How can you care for yourself at home? · Practice healthy eating habits. This includes eating plenty of fruits, vegetables, whole grains, lean protein, and low-fat dairy. · If your doctor recommends it, get more exercise. Walking is a good choice.  Bit by bit, increase the amount you walk every day. Try for at least 30 minutes on most days of the week. · Do not smoke. Smoking can increase your risk for health problems. If you need help quitting, talk to your doctor about stop-smoking programs and medicines. These can increase your chances of quitting for good. · Limit alcohol to 2 drinks a day for men and 1 drink a day for women. Too much alcohol can cause health problems. If you have a BMI higher than 25  · Your doctor may do other tests to check your risk for weight-related health problems. This may include measuring the distance around your waist. A waist measurement of more than 40 inches in men or 35 inches in women can increase the risk of weight-related health problems. · Talk with your doctor about steps you can take to stay healthy or improve your health. You may need to make lifestyle changes to lose weight and stay healthy, such as changing your diet and getting regular exercise. If you have a BMI lower than 18.5  · Your doctor may do other tests to check your risk for health problems. · Talk with your doctor about steps you can take to stay healthy or improve your health. You may need to make lifestyle changes to gain or maintain weight and stay healthy, such as getting more healthy foods in your diet and doing exercises to build muscle. Where can you learn more? Go to http://christina-jose de jesus.info/. Enter S176 in the search box to learn more about \"Body Mass Index: Care Instructions. \"  Current as of: June 25, 2018  Content Version: 11.9  © 3558-2685 [employer]. Care instructions adapted under license by [employer] (which disclaims liability or warranty for this information). If you have questions about a medical condition or this instruction, always ask your healthcare professional. [address] any warranty or liability for your use of this information.

## 2019-02-01 NOTE — PROGRESS NOTES
Hdietary management education, guidance, and counselingistory of present ILLNESS  Olivia Olsen is a 80 y.o. female. 1/19 patient is able to tolerate Lipitor 10 mg once in 1-2 weeks only. LDL has increased again. 11/16 seen for abn EKG, h/o 45-50%EF; had jaw pain and left neck discomfort- worse with bending over  10/16 had SOB/edema- improved now. Had high BP which is being treated now. Was drinking extra fluids due to renal stone which has now been passed and will not need lithotripsy now          CHF   The history is provided by the medical records. This is a chronic problem. Associated symptoms include shortness of breath. Pertinent negatives include no chest pain and no headaches. Hypertension   The history is provided by the patient. This is a new problem. The current episode started more than 1 week ago (treatment started 10/16 again after some yrs). Associated symptoms include shortness of breath. Pertinent negatives include no chest pain and no headaches. Cholesterol Problem   The history is provided by the medical records. This is a chronic problem. Associated symptoms include shortness of breath. Pertinent negatives include no chest pain and no headaches. Leg Swelling   The history is provided by the patient. This is a chronic problem. The current episode started more than 1 week ago. The problem occurs rarely (once in 3-4 months). The problem has not changed since onset. Associated symptoms include shortness of breath. Pertinent negatives include no chest pain and no headaches. The symptoms are aggravated by standing. The symptoms are relieved by sleep. Shortness of Breath   The history is provided by the patient. This is a new problem. The problem occurs rarely (3 times/yr). The current episode started more than 1 week ago (yrs). The problem has been resolved. Associated symptoms include leg swelling.  Pertinent negatives include no fever, no headaches, no cough, no wheezing, no PND, no orthopnea, no chest pain, no vomiting, no rash and no claudication. The problem's precipitants include exercise (walking across parking lot; 11/16 improving; 3/17 2flights but can walk >2 miles; 7/18 around the block). Review of Systems   Constitutional: Negative for chills, fever, malaise/fatigue and weight loss. HENT: Negative for nosebleeds. Eyes: Negative for discharge. Respiratory: Positive for shortness of breath. Negative for cough and wheezing. Cardiovascular: Positive for leg swelling. Negative for chest pain, palpitations, orthopnea, claudication and PND. Gastrointestinal: Negative for diarrhea, nausea and vomiting. Genitourinary: Negative for dysuria and hematuria. Musculoskeletal: Negative for joint pain. Skin: Negative for rash. Neurological: Negative for dizziness, seizures, loss of consciousness and headaches. Endo/Heme/Allergies: Negative for polydipsia. Does not bruise/bleed easily. Psychiatric/Behavioral: Negative for depression and substance abuse. The patient does not have insomnia.       Allergies   Allergen Reactions    Lipitor [Atorvastatin] Myalgia     Quit 2/17    Nsaids (Non-Steroidal Anti-Inflammatory Drug) Other (comments)     Severe abdominal pain    Pcn [Penicillins] Swelling     Swelling, hives & photosensitive rxn    Statins-Hmg-Coa Reductase Inhibitors Other (comments)     Lag crumps       Past Medical History:   Diagnosis Date    Acute reaction to stress     Closed fracture of left wrist     Diastolic dysfunction     Fatty liver     GERD (gastroesophageal reflux disease)     Hyperlipidemia     Hypertension     diastolic dysfunction    Hypoglycemia     Kidney stones     11/16 for yrs; spon passage always so far    RSD (reflex sympathetic dystrophy) 8/2009    no blood pressure or sticks in left arm    Syncope 2000    no recurrence since; was orthostatic at that time    Unspecified adverse effect of anesthesia     hard to sedate       Family History   Problem Relation Age of Onset    Diabetes Maternal Aunt     Cancer Maternal Uncle         throat    Cancer Maternal Grandmother         melanoma    Heart Disease Other     Heart Attack Paternal Grandmother 48    Hypertension Mother     Cancer Maternal Grandfather         Melanoma    Stroke Neg Hx        Social History     Tobacco Use    Smoking status: Former Smoker     Packs/day: 0.50     Years: 11.00     Pack years: 5.50     Last attempt to quit: 2003     Years since quittin.0    Smokeless tobacco: Never Used   Substance Use Topics    Alcohol use: Yes     Alcohol/week: 0.6 oz     Types: 1 Glasses of wine per week     Frequency: Monthly or less     Drinks per session: 1 or 2     Comment: rarely- 3-4 drinks/yr    Drug use: No        Current Outpatient Medications   Medication Sig    acetaminophen (TYLENOL) 325 mg tablet Take  by mouth every four (4) hours as needed for Pain.  furosemide (LASIX) 20 mg tablet TAKE 1 TABLET BY MOUTH DAILY AS NEEDED    cholecalciferol, VITAMIN D3, (VITAMIN D3) 5,000 unit tab tablet Take  by mouth daily.  labetalol (NORMODYNE) 200 mg tablet TAKE ONE TABLET BY MOUTH 2 TIMES A DAY    losartan (COZAAR) 100 mg tablet Take 1 Tab by mouth daily.  letrozole (FEMARA) 2.5 mg tablet Take 2.5 mg by mouth daily.  albuterol (PROVENTIL HFA, VENTOLIN HFA, PROAIR HFA) 90 mcg/actuation inhaler Take 1 Puff by inhalation every four (4) hours as needed for Wheezing.  Dexlansoprazole (DEXILANT) 60 mg CpDB Take  by mouth Daily (before breakfast).  cycloSPORINE (RESTASIS) 0.05 % ophthalmic emulsion Administer 1 Drop to both eyes two (2) times a day.  aluminum hydrox-magnesium carb (GAVISCON EXTRA STRENGTH) 160-105 mg chew Take 2 Tabs by mouth as needed. No current facility-administered medications for this visit.          Past Surgical History:   Procedure Laterality Date    HX CARPAL TUNNEL RELEASE      HX GYN      lap. fibroid removal    HX HEART CATHETERIZATION  2005 approx    normal coronaries per pt    HX MASTECTOMY Right 11/30/2017    RIGHT BREAST LUMPECTOMY WITH NEEDLE LOCALIZATION Jhonny Sultana LYMPH NODE BIOPSY  performed by Kvng Santana MD at [address] [place] [address] HX ORTHOPAEDIC      ORIF left wrist    HX SHOULDER ARTHROSCOPY Left 1988       Diagnostic Studies: Old records reviewed and show as follows  EKG tracings reviewed by me today. No flowsheet data found. 11/16 Nuc Stress  Conclusion:    1. Normal perfusion scan.    2. Normal wall motion and ejection fraction.    3. Low risk scan. Visit Vitals  /70   Pulse 76   Ht 5' 1\" (1.549 m)   Wt 82.6 kg (182 lb)   BMI 34.39 kg/m²       Ms. David Joshi has a reminder for a \"due or due soon\" health maintenance. I have asked that she contact her primary care provider for follow-up on this health maintenance. Physical Exam   Constitutional: She is oriented to person, place, and time. She appears well-developed and well-nourished. No distress. obese   HENT:   Head: Normocephalic and atraumatic. Mouth/Throat: Normal dentition. Eyes: Right eye exhibits no discharge. Left eye exhibits no discharge. No scleral icterus. Neck: Neck supple. No JVD present. Carotid bruit is not present. No thyromegaly present. Cardiovascular: Normal rate, regular rhythm, S1 normal, S2 normal, normal heart sounds and intact distal pulses. Exam reveals no gallop and no friction rub. No murmur heard. Pulmonary/Chest: Effort normal and breath sounds normal. She has no wheezes. She has no rales. Abdominal: Soft. She exhibits no mass. There is no tenderness. Musculoskeletal: She exhibits no edema. Lymphadenopathy:        Right cervical: No superficial cervical adenopathy present. Left cervical: No superficial cervical adenopathy present. Neurological: She is alert and oriented to person, place, and time. Skin: Skin is warm and dry. No rash noted. Psychiatric: She has a normal mood and affect.  Her behavior is normal.       ASSESSMENT and PLAN    I have reviewed/discussed the above normal BMI with the patient. I have recommended the following interventions: dietary management education, guidance, and counseling, encourage exercise and monitor weight . AAC CVD risk is calculated at 9.2% over 10 years. She has tried different statins and cannot tolerate. She is also try to lose weight. She tries to follow diet but is not very regular about her exercise. She has significant joint pains and history of breast cancer and takes Femara. [place] [facility] [id number]    HTN controlled    Will try Crestor before the go to [facility] 9 inhibitors. Diagnoses and all orders for this visit:    1. Chronic diastolic congestive heart failure (HCC)  -     AMB POC EKG ROUTINE W/ 12 LEADS, INTER & REP    2. Essential hypertension    3. Pure hypercholesterolemia  -     rosuvastatin (CRESTOR) 5 mg tablet; Take 1 Tab by mouth nightly. -     LIPID PANEL; Future  -     HEPATIC FUNCTION PANEL; Future    4. Obesity (BMI 30.0-34. 9)        Pertinent laboratory and test data reviewed and discussed with patient.   See patient instructions also for other medical advice given    Medications Discontinued During This Encounter   Medication Reason    ondansetron hcl (ZOFRAN) 4 mg tablet     oxyCODONE-acetaminophen (PERCOCET) 5-325 mg per tablet     acetaminophen/diphenhydramine (TYLENOL PM PO)     FLAXSEED OIL (OMEGA 3 PO)        Follow-up Disposition:  Return in about 3 months (around 5/1/2019), or if symptoms worsen or fail to improve, for post test.

## 2019-02-01 NOTE — PROGRESS NOTES
1. Have you been to the ER, urgent care clinic since your last visit? Hospitalized since your last visit? Yes When: 8/2018 Where: [address] Reason for visit: Kidney Stones     2. Have you seen or consulted any other health care providers outside of the [facility] since your last visit? Include any pap smears or colon screening. Yes Where: Oncology Reason for visit: Breast Cancer     3. Since your last visit, have you had any of the following symptoms?      swelling in legs/arms. Explain: occasional swelling in legs    4. Have you had any blood work, X-rays or cardiac testing? Yes Where: \[facility]\"" Reason for visit: Ribs/CXR/LFT/Lipid     Requested: YES     In [facility]: YES    5. Where do you normally have your labs drawn? [address]      6. Do you need any refills today?    No

## 2019-02-01 NOTE — PROGRESS NOTES
Hdietary management education, guidance, and counselingistory of present ILLNESS Jeremy Knapp is a 80 y.o. female. 11/16 seen for abn EKG, h/o 45-50%EF; had jaw pain and left neck discomfort- worse with bending over 10/16 had SOB/edema- improved now. Had high BP which is being treated now. Was drinking extra fluids due to renal stone which has now been passed and will not need lithotripsy now CHF The history is provided by the medical records. This is a chronic problem. Associated symptoms include shortness of breath. Pertinent negatives include no chest pain and no headaches. Hypertension The history is provided by the patient. This is a new problem. The current episode started more than 1 week ago (treatment started 10/16 again after some yrs). Associated symptoms include shortness of breath. Pertinent negatives include no chest pain and no headaches. Cholesterol Problem The history is provided by the medical records. This is a chronic problem. Associated symptoms include shortness of breath. Pertinent negatives include no chest pain and no headaches. Leg Swelling The history is provided by the patient. This is a chronic problem. The current episode started more than 1 week ago. The problem occurs rarely (once in 3-4 months). Associated symptoms include shortness of breath. Pertinent negatives include no chest pain and no headaches. The symptoms are aggravated by standing. The symptoms are relieved by sleep. Shortness of Breath The history is provided by the patient. This is a new problem. The problem occurs rarely (3 times/yr). The current episode started more than 1 week ago (yrs). The problem has been gradually improving (since radiation and femara stopped). Pertinent negatives include no fever, no headaches, no cough, no wheezing, no PND, no orthopnea, no chest pain, no vomiting, no rash, no leg swelling and no claudication.  The problem's precipitants include exercise (walking across parking lot; 11/16 improving; 3/17 2flights but can walk >2 miles; 7/18 around the block). Review of Systems Constitutional: Negative for chills, fever, malaise/fatigue and weight loss. HENT: Negative for nosebleeds. Eyes: Negative for discharge. Respiratory: Positive for shortness of breath. Negative for cough and wheezing. Cardiovascular: Negative for chest pain, palpitations, orthopnea, claudication, leg swelling and PND. Gastrointestinal: Negative for diarrhea, nausea and vomiting. Genitourinary: Negative for dysuria and hematuria. Musculoskeletal: Negative for joint pain. Skin: Negative for rash. Neurological: Negative for dizziness, seizures, loss of consciousness and headaches. Endo/Heme/Allergies: Negative for polydipsia. Does not bruise/bleed easily. Psychiatric/Behavioral: Negative for depression and substance abuse. The patient does not have insomnia. Allergies Allergen Reactions  Lipitor [Atorvastatin] Myalgia Quit 2/17  Nsaids (Non-Steroidal Anti-Inflammatory Drug) Other (comments) Severe abdominal pain  Pcn [Penicillins] Swelling Swelling, hives & photosensitive rxn  Statins-Hmg-Coa Reductase Inhibitors Other (comments) Lag crumps Past Medical History:  
Diagnosis Date  Acute reaction to stress  Closed fracture of left wrist   
 Diastolic dysfunction  Fatty liver  GERD (gastroesophageal reflux disease)  Hyperlipidemia  Hypertension   
 diastolic dysfunction  Hypoglycemia  Kidney stones 11/16 for yrs; spon passage always so far  RSD (reflex sympathetic dystrophy) 8/2009  
 no blood pressure or sticks in left arm  Syncope 2000  
 no recurrence since; was orthostatic at that time  Unspecified adverse effect of anesthesia   
 hard to sedate Family History Problem Relation Age of Onset  Diabetes Maternal Aunt  Cancer Maternal Uncle throat  Cancer Maternal Grandmother   
     melanoma  Heart Disease Other  Heart Attack Paternal Grandmother 47  Hypertension Mother  Cancer Maternal Grandfather Melanoma  Stroke Neg Hx Social History Tobacco Use  Smoking status: Former Smoker Packs/day: 0.50 Years: 11.00 Pack years: 5.50 Last attempt to quit: 2003 Years since quittin.0  Smokeless tobacco: Never Used Substance Use Topics  Alcohol use: Yes Alcohol/week: 0.6 oz Types: 1 Glasses of wine per week Frequency: Monthly or less Drinks per session: 1 or 2 Comment: rarely- 3-4 drinks/yr  Drug use: No  
  
 
Current Outpatient Medications Medication Sig  
 acetaminophen (TYLENOL) 325 mg tablet Take  by mouth every four (4) hours as needed for Pain.  furosemide (LASIX) 20 mg tablet TAKE 1 TABLET BY MOUTH DAILY AS NEEDED  cholecalciferol, VITAMIN D3, (VITAMIN D3) 5,000 unit tab tablet Take  by mouth daily.  labetalol (NORMODYNE) 200 mg tablet TAKE ONE TABLET BY MOUTH 2 TIMES A DAY  losartan (COZAAR) 100 mg tablet Take 1 Tab by mouth daily.  letrozole (FEMARA) 2.5 mg tablet Take 2.5 mg by mouth daily.  albuterol (PROVENTIL HFA, VENTOLIN HFA, PROAIR HFA) 90 mcg/actuation inhaler Take 1 Puff by inhalation every four (4) hours as needed for Wheezing.  Dexlansoprazole (DEXILANT) 60 mg CpDB Take  by mouth Daily (before breakfast).  cycloSPORINE (RESTASIS) 0.05 % ophthalmic emulsion Administer 1 Drop to both eyes two (2) times a day.  aluminum hydrox-magnesium carb (GAVISCON EXTRA STRENGTH) 160-105 mg chew Take 2 Tabs by mouth as needed. No current facility-administered medications for this visit. Past Surgical History:  
Procedure Laterality Date  HX CARPAL TUNNEL RELEASE  HX GYN    
 lap. fibroid removal  
 HX HEART CATHETERIZATION   approx  
 normal coronaries per pt  HX MASTECTOMY Right 2017 RIGHT BREAST LUMPECTOMY WITH NEEDLE LOCALIZATION [facility]  performed by Michelle Bowles MD at [address] HX ORTHOPAEDIC    
 ORIF left wrist  
 HX SHOULDER ARTHROSCOPY Left 1988 Diagnostic Studies: Old records reviewed and show as follows EKG tracings reviewed by me today. No flowsheet data found. 11/16 Nuc Stress Conclusion:   
1. Normal perfusion scan.   
2. Normal wall motion and ejection fraction.   
3. Low risk scan. Visit Vitals /70 Pulse 76 Ht 5' 1\" (1.549 m) Wt 82.6 kg (182 lb) BMI 34.39 kg/m² Ms. Meliza Griggs has a reminder for a \"due or due soon\" health maintenance. I have asked that she contact her primary care provider for follow-up on this health maintenance. Physical Exam  
Constitutional: She is oriented to person, place, and time. She appears well-developed and well-nourished. No distress. obese HENT:  
Head: Normocephalic and atraumatic. Mouth/Throat: Normal dentition. Eyes: Right eye exhibits no discharge. Left eye exhibits no discharge. No scleral icterus. Neck: Neck supple. No JVD present. Carotid bruit is not present. No thyromegaly present. Cardiovascular: Normal rate, regular rhythm, S1 normal, S2 normal, normal heart sounds and intact distal pulses. Exam reveals no gallop and no friction rub. No murmur heard. Pulmonary/Chest: Effort normal and breath sounds normal. She has no wheezes. She has no rales. Abdominal: Soft. She exhibits no mass. There is no tenderness. Musculoskeletal: She exhibits no edema. Lymphadenopathy:  
     Right cervical: No superficial cervical adenopathy present. Left cervical: No superficial cervical adenopathy present. Neurological: She is alert and oriented to person, place, and time. Skin: Skin is warm and dry. No rash noted. Psychiatric: She has a normal mood and affect.  Her behavior is normal.  
 
 
ASSESSMENT and PLAN 
 
 I have reviewed/discussed the above normal BMI with the patient. I have recommended the following interventions: dietary management education, guidance, and counseling . . ZFPT0 Will try statin again twice a week- will call us which statin she has at home HTN controlled 1/31/2019 Results for Virginia Mejia (MRN [id number]) as of 2/1/2019 09:38 Ref. Range 1/31/2019 08:52 Bilirubin, total Latest Ref Range: 0.2 - 1.0 MG/DL 0.2 Bilirubin, direct Latest Ref Range: 0.0 - 0.2 MG/DL <0.1 Protein, total Latest Ref Range: 6.4 - 8.2 g/dL 6.5 Albumin Latest Ref Range: 3.4 - 5.0 g/dL 3.7 Globulin Latest Ref Range: 2.0 - 4.0 g/dL 2.8 A-G Ratio Latest Ref Range: 0.8 - 1.7   1.3 ALT (SGPT) Latest Ref Range: 13 - 56 U/L 25 AST Latest Ref Range: 15 - 37 U/L 17 Alk. phosphatase Latest Ref Range: 45 - 117 U/L 73 Triglyceride Latest Ref Range: <150 MG/ Cholesterol, total Latest Ref Range: <200 MG/ (H) HDL Cholesterol Latest Ref Range: 40 - 60 MG/DL 56  
CHOL/HDL Ratio Latest Ref Range: 0 - 5.0   4.2 LDL, calculated Latest Ref Range: 0 - 100 MG/.2 (H) VLDL, calculated Latest Units: MG/DL 24.8 Diagnoses and all orders for this visit: 
 
1. Chronic diastolic congestive heart failure ([address] -     AMB POC EKG ROUTINE W/ 12 LEADS, INTER & REP 2. Essential hypertension 3. Pure hypercholesterolemia 4. Obesity (BMI 30.0-34. 9) Pertinent laboratory and test data reviewed and discussed with patient. See patient instructions also for other medical advice given Medications Discontinued During This Encounter Medication Reason  ondansetron hcl (ZOFRAN) 4 mg tablet  oxyCODONE-acetaminophen (PERCOCET) 5-325 mg per tablet  acetaminophen/diphenhydramine (TYLENOL PM PO)  FLAXSEED OIL (OMEGA 3 PO) Follow-up Disposition: Not on File

## 2019-02-18 DIAGNOSIS — Z85.3 HISTORY OF BREAST CANCER: Primary | ICD-10-CM

## 2019-02-25 ENCOUNTER — HOSPITAL ENCOUNTER (OUTPATIENT)
Dept: MAMMOGRAPHY | Age: 69
Discharge: HOME | End: 2019-02-25
Payer: MEDICARE

## 2019-02-25 DIAGNOSIS — Z85.3 HISTORY OF BREAST CANCER: ICD-10-CM

## 2019-02-25 PROCEDURE — 77062 BREAST TOMOSYNTHESIS BI: CPT

## 2019-04-10 ENCOUNTER — OFFICE VISIT (OUTPATIENT)
Dept: SURGERY | Age: 69
End: 2019-04-10

## 2019-04-10 VITALS
BODY MASS INDEX: 34.36 KG/M2 | DIASTOLIC BLOOD PRESSURE: 70 MMHG | WEIGHT: 182 LBS | HEART RATE: 76 BPM | SYSTOLIC BLOOD PRESSURE: 124 MMHG | HEIGHT: 61 IN | OXYGEN SATURATION: 98 % | RESPIRATION RATE: 16 BRPM

## 2019-04-10 DIAGNOSIS — Z85.3 PERSONAL HISTORY OF BREAST CANCER: Primary | ICD-10-CM

## 2019-04-10 NOTE — PROGRESS NOTES
Progress Note Patient: Lewis Gonzalez  MRN: [id number]  SSN: [id number] YOB: 1950  Age: 80 y.o. Sex: female Chief Complaint Patient presents with  Follow-up 6 month with mammo HPI Ms. Delonte Lucas is a 81-year-old woman history of a T1b N0 M0 hormone positive right breast cancer he is back for follow-up. Her recent mammogram is BI-RADS 2 she really does not have any breast health complaints. She does have some rib pain and that has been x-rayed and is normal.  She would like to try some steroids to rule out costochondritis. I will give her a quick prednisone dose dose pack and see how things go. Past Medical History:  
Diagnosis Date  Acute reaction to stress  Closed fracture of left wrist   
 Diastolic dysfunction  Fatty liver  GERD (gastroesophageal reflux disease)  Hyperlipidemia  Hypertension   
 diastolic dysfunction  Hypoglycemia  Kidney stones 11/16 for yrs; spon passage always so far  RSD (reflex sympathetic dystrophy) 8/2009  
 no blood pressure or sticks in left arm  Syncope 2000  
 no recurrence since; was orthostatic at that time  Unspecified adverse effect of anesthesia   
 hard to sedate Past Surgical History:  
Procedure Laterality Date  HX CARPAL TUNNEL RELEASE  HX GYN    
 lap. fibroid removal  
 HX HEART CATHETERIZATION  2005 approx  
 normal coronaries per pt  HX MASTECTOMY Right 11/30/2017 RIGHT BREAST LUMPECTOMY WITH NEEDLE LOCALIZATION [facility] LYMPH NODE BIOPSY  performed by Melissa Chavarria MD at [address] HX ORTHOPAEDIC    
 ORIF left wrist  
 HX SHOULDER ARTHROSCOPY Left 1988 Allergies Allergen Reactions  Lipitor [Atorvastatin] Myalgia Quit 2/17  Nsaids (Non-Steroidal Anti-Inflammatory Drug) Other (comments) Severe abdominal pain  Pcn [Penicillins] Swelling Swelling, hives & photosensitive rxn  Statins-Hmg-Coa Reductase Inhibitors Other (comments) Lag crumps Current Outpatient Medications Medication Sig Dispense Refill  acetaminophen (TYLENOL) 325 mg tablet Take  by mouth every four (4) hours as needed for Pain.  rosuvastatin (CRESTOR) 5 mg tablet Take 1 Tab by mouth nightly. 30 Tab 3  furosemide (LASIX) 20 mg tablet TAKE 1 TABLET BY MOUTH DAILY AS NEEDED 30 Tab 0  cholecalciferol, VITAMIN D3, (VITAMIN D3) 5,000 unit tab tablet Take  by mouth daily.  labetalol (NORMODYNE) 200 mg tablet TAKE ONE TABLET BY MOUTH 2 TIMES A  Tab 2  
 losartan (COZAAR) 100 mg tablet Take 1 Tab by mouth daily. 90 Tab 2  
 letrozole (FEMARA) 2.5 mg tablet Take 2.5 mg by mouth daily.  albuterol (PROVENTIL HFA, VENTOLIN HFA, PROAIR HFA) 90 mcg/actuation inhaler Take 1 Puff by inhalation every four (4) hours as needed for Wheezing. 1 Inhaler 5  
 Dexlansoprazole (DEXILANT) 60 mg CpDB Take  by mouth Daily (before breakfast).  cycloSPORINE (RESTASIS) 0.05 % ophthalmic emulsion Administer 1 Drop to both eyes two (2) times a day.  aluminum hydrox-magnesium carb (GAVISCON EXTRA STRENGTH) 160-105 mg chew Take 2 Tabs by mouth as needed. Social History Socioeconomic History  Marital status:  Spouse name: Not on file  Number of children: Not on file  Years of education: Not on file  Highest education level: Not on file Occupational History  Not on file Social Needs  Financial resource strain: Not on file  Food insecurity:  
  Worry: Not on file Inability: Not on file  Transportation needs:  
  Medical: Not on file Non-medical: Not on file Tobacco Use  Smoking status: Former Smoker Packs/day: 0.50 Years: 11.00 Pack years: 5.50 Last attempt to quit: 2003 Years since quittin.2  Smokeless tobacco: Never Used Substance and Sexual Activity  Alcohol use: Yes Alcohol/week: 0.6 oz Types: 1 Glasses of wine per week Frequency: Monthly or less Drinks per session: 1 or 2 Comment: rarely- 3-4 drinks/yr  Drug use: No  
 Sexual activity: Not Currently Lifestyle  Physical activity:  
  Days per week: Not on file Minutes per session: Not on file  Stress: Not on file Relationships  Social connections:  
  Talks on phone: Not on file Gets together: Not on file Attends Hoahaoism service: Not on file Active member of club or organization: Not on file Attends meetings of clubs or organizations: Not on file Relationship status: Not on file  Intimate partner violence:  
  Fear of current or ex partner: Not on file Emotionally abused: Not on file Physically abused: Not on file Forced sexual activity: Not on file Other Topics Concern  Not on file Social History Narrative  Not on file Family History Problem Relation Age of Onset  Diabetes Maternal Aunt  Cancer Maternal Uncle   
     throat  Cancer Maternal Grandmother   
     melanoma  Heart Disease Other  Heart Attack Paternal Grandmother 47  Hypertension Mother  Cancer Maternal Grandfather Melanoma  Stroke Neg Hx Review of systems: 
Patient denies any reflux, emesis, abdominal pain, change in bowel habits, hematochezia, melena, fever, weight loss, fatigue chills, dermatitis, abnormal moles, change in vision, vertigo, epistaxis, dysphagia, hoarseness, chest pain, palpitations, hypertension, edema, cough, shortness of breath, wheezing, hemoptysis, snoring, hematuria, diabetes, thyroid disease, anemia, bruising, history of blood transfusion, dizziness, headache, or fainting. Physical Examination Well developed well nourished female in no apparent distress Visit Vitals /70 Pulse 76 Resp 16 Ht 5' 1\" (1.549 m) Wt 82.6 kg (182 lb) SpO2 98% BMI 34.39 kg/m² Head: normocephalic, atraumatic Mouth: Clear, no overt lesions, oral mucosa pink and moist 
 Neck: supple, no masses, no adenopathy or carotid bruits, trachea midline Resp: clear to auscultation bilaterally, no wheeze, rhonchi or rales, excursions normal and symmetrical 
Cardio: Regular rate and rhythm, no murmurs, clicks, gallops or rubs, no edema or varicosities Abdomen: soft, nontender, nondistended, normoactive bowel sounds, no hernias, no hepatosplenomegaly,  
Back: Deferred Extremeties: warm, well-perfused, no tenderness or swelling, normal gait/station Neuro: sensation and strength grossly intact and symmetrical 
Psych: alert and oriented to person, place and time Breast exam bilateral breast exam without dominant mass, skin change, nipple discharge, or lymphadenopathy IMPRESSION Stable follow-up after early stage breast cancer PLAN No orders of the defined types were placed in this encounter.  
 
Follow-up in 6 months with mammogram 
Olga Hernandez MD

## 2019-04-10 NOTE — PROGRESS NOTES
Chief Complaint Patient presents with  Follow-up 6 month with mammo 1. Have you been to the ER, urgent care clinic since your last visit? Hospitalized since your last visit? No 
 
2. Have you seen or consulted any other health care providers outside of the [address] [place] [address] since your last visit? Include any pap smears or colon screening.  No

## 2019-04-16 DIAGNOSIS — I10 ESSENTIAL HYPERTENSION: ICD-10-CM

## 2019-04-16 RX ORDER — LABETALOL 200 MG/1
TABLET, FILM COATED ORAL
Qty: 180 TAB | Refills: 2 | Status: SHIPPED | OUTPATIENT
Start: 2019-04-16 | End: 2020-01-07

## 2019-04-16 NOTE — TELEPHONE ENCOUNTER
Phone called received from [address] [place] [address] for patient's requesting refill of Labetalol 200 mg tablets.

## 2019-04-17 DIAGNOSIS — I10 ESSENTIAL HYPERTENSION: ICD-10-CM

## 2019-04-17 RX ORDER — LOSARTAN POTASSIUM 100 MG/1
TABLET ORAL
Qty: 90 TAB | Refills: 0 | Status: SHIPPED | OUTPATIENT
Start: 2019-04-17 | End: 2020-07-27 | Stop reason: SDUPTHER

## 2019-05-16 ENCOUNTER — HOSPITAL ENCOUNTER (OUTPATIENT)
Dept: NUCLEAR MEDICINE | Age: 69
Discharge: HOME | End: 2019-05-16
Attending: INTERNAL MEDICINE
Payer: MEDICARE

## 2019-05-16 ENCOUNTER — HOSPITAL ENCOUNTER (OUTPATIENT)
Dept: GENERAL RADIOLOGY | Age: 69
Discharge: HOME | End: 2019-05-16
Attending: INTERNAL MEDICINE
Payer: MEDICARE

## 2019-05-16 DIAGNOSIS — C50.411 MALIGNANT NEOPLASM OF UPPER-OUTER QUADRANT OF RIGHT FEMALE BREAST (HCC): ICD-10-CM

## 2019-05-16 DIAGNOSIS — Q78.8: ICD-10-CM

## 2019-05-16 PROCEDURE — 78306 BONE IMAGING WHOLE BODY: CPT

## 2019-05-16 PROCEDURE — 72100 X-RAY EXAM L-S SPINE 2/3 VWS: CPT

## 2019-05-16 PROCEDURE — 72070 X-RAY EXAM THORAC SPINE 2VWS: CPT

## 2019-05-23 ENCOUNTER — HOSPITAL ENCOUNTER (OUTPATIENT)
Dept: LAB | Age: 69
Discharge: HOME | End: 2019-05-23
Payer: MEDICARE

## 2019-05-23 DIAGNOSIS — E78.00 PURE HYPERCHOLESTEROLEMIA: ICD-10-CM

## 2019-05-23 LAB
ALBUMIN SERPL-MCNC: 3.7 G/DL (ref 3.4–5)
ALBUMIN/GLOB SERPL: 1.4 {RATIO} (ref 0.8–1.7)
ALP SERPL-CCNC: 55 U/L (ref 45–117)
ALT SERPL-CCNC: 30 U/L (ref 13–56)
AST SERPL-CCNC: 21 U/L (ref 15–37)
BILIRUB DIRECT SERPL-MCNC: <0.1 MG/DL (ref 0–0.2)
BILIRUB SERPL-MCNC: 0.3 MG/DL (ref 0.2–1)
CHOLEST SERPL-MCNC: 186 MG/DL
GLOBULIN SER CALC-MCNC: 2.6 G/DL (ref 2–4)
HDLC SERPL-MCNC: 59 MG/DL (ref 40–60)
HDLC SERPL: 3.2 {RATIO} (ref 0–5)
LDLC SERPL CALC-MCNC: 100.2 MG/DL (ref 0–100)
LIPID PROFILE,FLP: ABNORMAL
PROT SERPL-MCNC: 6.3 G/DL (ref 6.4–8.2)
TRIGL SERPL-MCNC: 134 MG/DL (ref ?–150)
VLDLC SERPL CALC-MCNC: 26.8 MG/DL

## 2019-05-23 PROCEDURE — 80076 HEPATIC FUNCTION PANEL: CPT

## 2019-05-23 PROCEDURE — 80061 LIPID PANEL: CPT

## 2019-05-23 PROCEDURE — 36415 COLL VENOUS BLD VENIPUNCTURE: CPT

## 2019-05-24 ENCOUNTER — OFFICE VISIT (OUTPATIENT)
Dept: CARDIOLOGY CLINIC | Age: 69
End: 2019-05-24

## 2019-05-24 VITALS
BODY MASS INDEX: 34.66 KG/M2 | DIASTOLIC BLOOD PRESSURE: 77 MMHG | WEIGHT: 183.6 LBS | HEIGHT: 61 IN | HEART RATE: 76 BPM | SYSTOLIC BLOOD PRESSURE: 145 MMHG

## 2019-05-24 DIAGNOSIS — E78.00 PURE HYPERCHOLESTEROLEMIA: Primary | ICD-10-CM

## 2019-05-24 DIAGNOSIS — I10 ESSENTIAL HYPERTENSION: ICD-10-CM

## 2019-05-24 DIAGNOSIS — E66.9 OBESITY (BMI 30.0-34.9): ICD-10-CM

## 2019-05-24 DIAGNOSIS — I50.32 CHRONIC DIASTOLIC CONGESTIVE HEART FAILURE (HCC): ICD-10-CM

## 2019-05-24 RX ORDER — ROSUVASTATIN CALCIUM 10 MG/1
10 TABLET, COATED ORAL
Qty: 90 TAB | Refills: 1 | Status: SHIPPED | OUTPATIENT
Start: 2019-05-24 | End: 2020-07-27

## 2019-05-24 NOTE — PATIENT INSTRUCTIONS
Medications Discontinued During This Encounter   Medication Reason    rosuvastatin (CRESTOR) 5 mg tablet      After the recommended changes have been made in blood pressure medicines, patient advised to keep BP/HR(pulse rate) chart twice daily and bring us results in next 2 weeks or so. Patient may send the results via \"My Chart\" if desired. Please rest for 5-10 minutes before checking blood pressure         Learning About the Mediterranean Diet  What is the Mediterranean diet? The Mediterranean diet is a style of eating rather than a diet plan. It features foods eaten in [place], [place], [place] and [place], and other countries along the [facility]. It emphasizes eating foods like fish, fruits, vegetables, beans, high-fiber breads and whole grains, nuts, and olive oil. This style of eating includes limited red meat, cheese, and sweets. Why choose the Mediterranean diet? A Mediterranean-style diet may improve heart health. It contains more fat than other heart-healthy diets. But the fats are mainly from nuts, unsaturated oils (such as fish oils and olive oil), and certain nut or seed oils (such as canola, soybean, or flaxseed oil). These fats may help protect the heart and blood vessels. How can you get started on the Mediterranean diet? Here are some things you can do to switch to a more Mediterranean way of eating. What to eat  · Eat a variety of fruits and vegetables each day, such as grapes, blueberries, tomatoes, broccoli, peppers, figs, olives, spinach, eggplant, beans, lentils, and chickpeas. · Eat a variety of whole-grain foods each day, such as oats, brown rice, and whole wheat bread, pasta, and couscous. · Eat fish at least 2 times a week. Try tuna, salmon, mackerel, lake trout, herring, or sardines. · Eat moderate amounts of low-fat dairy products, such as milk, cheese, or yogurt. · Eat moderate amounts of poultry and eggs.   · Choose healthy (unsaturated) fats, such as nuts, olive oil, and certain nut or seed oils like canola, soybean, and flaxseed. · Limit unhealthy (saturated) fats, such as butter, palm oil, and coconut oil. And limit fats found in animal products, such as meat and dairy products made with whole milk. Try to eat red meat only a few times a month in very small amounts. · Limit sweets and desserts to only a few times a week. This includes sugar-sweetened drinks like soda. The Mediterranean diet may also include red wine with your meal--1 glass each day for women and up to 2 glasses a day for men. Tips for eating at home  · Use herbs, spices, garlic, lemon zest, and citrus juice instead of salt to add flavor to foods. · Add avocado slices to your sandwich instead of quiros. · Have fish for lunch or dinner instead of red meat. Brush the fish with olive oil, and broil or grill it. · Sprinkle your salad with seeds or nuts instead of cheese. · Cook with olive or canola oil instead of butter or oils that are high in saturated fat. · Switch from 2% milk or whole milk to 1% or fat-free milk. · Dip raw vegetables in a vinaigrette dressing or hummus instead of dips made from mayonnaise or sour cream.  · Have a piece of fruit for dessert instead of a piece of cake. Try baked apples, or have some dried fruit. Tips for eating out  · Try broiled, grilled, baked, or poached fish instead of having it fried or breaded. · Ask your  to have your meals prepared with olive oil instead of butter. · Order dishes made with marinara sauce or sauces made from olive oil. Avoid sauces made from cream or mayonnaise. · Choose whole-grain breads, whole wheat pasta and pizza crust, brown rice, beans, and lentils. · Cut back on butter or margarine on bread. Instead, you can dip your bread in a small amount of olive oil. · Ask for a side salad or grilled vegetables instead of french fries or chips. Where can you learn more? Go to http://christina-jose de jesus.info/.   Enter O407 in the search box to learn more about \"Learning About the Mediterranean Diet. \"  Current as of: March 28, 2018  Content Version: 11.9  © 3584-3688 [employer]. Care instructions adapted under license by [employer] (which disclaims liability or warranty for this information). If you have questions about a medical condition or this instruction, always ask your healthcare professional. [place] [address] any warranty or liability for your use of this information.

## 2019-05-24 NOTE — PROGRESS NOTES
Hdietary management education, guidance, and counselingistory of present ILLNESS  Lewis Gonzalez is a 80 y.o. female. 5/19 patient tolerating Crestor once or twice a week. LDL has shown improvement. She also has right lower anterior chest discomfort which is positional and with breathing and is being worked up with bone scan. 1/19 patient is able to tolerate Lipitor 10 mg once in 1-2 weeks only. LDL has increased again. 11/16 seen for abn EKG, h/o 45-50%EF; had jaw pain and left neck discomfort- worse with bending over  10/16 had SOB/edema- improved now. Had high BP which is being treated now. Was drinking extra fluids due to renal stone which has now been passed and will not need lithotripsy now        Hypertension   The history is provided by the patient. This is a new problem. The current episode started more than 1 week ago (treatment started 10/16 again after some yrs). Pertinent negatives include no chest pain, no headaches and no shortness of breath. CHF   The history is provided by the medical records. This is a chronic problem. Pertinent negatives include no chest pain, no headaches and no shortness of breath. Cholesterol Problem   The history is provided by the medical records. This is a chronic problem. Pertinent negatives include no chest pain, no headaches and no shortness of breath. Leg Swelling   The history is provided by the patient. This is a chronic problem. The current episode started more than 1 week ago. The problem occurs rarely (once in 3-4 months). The problem has not changed since onset. Pertinent negatives include no chest pain, no headaches and no shortness of breath. The symptoms are aggravated by standing. The symptoms are relieved by sleep. Review of Systems   Constitutional: Negative for chills, fever, malaise/fatigue and weight loss. HENT: Negative for nosebleeds. Eyes: Negative for discharge.    Respiratory: Negative for cough, shortness of breath and wheezing. Cardiovascular: Positive for leg swelling. Negative for chest pain, palpitations, orthopnea, claudication and PND. Gastrointestinal: Negative for diarrhea, nausea and vomiting. Genitourinary: Negative for dysuria and hematuria. Musculoskeletal: Negative for joint pain. Skin: Negative for rash. Neurological: Negative for dizziness, seizures, loss of consciousness and headaches. Endo/Heme/Allergies: Negative for polydipsia. Does not bruise/bleed easily. Psychiatric/Behavioral: Negative for depression and substance abuse. The patient does not have insomnia.       Allergies   Allergen Reactions    Lipitor [Atorvastatin] Myalgia     Quit 2/17    Nsaids (Non-Steroidal Anti-Inflammatory Drug) Other (comments)     Severe abdominal pain    Pcn [Penicillins] Swelling     Swelling, hives & photosensitive rxn    Statins-Hmg-Coa Reductase Inhibitors Other (comments)     Lag crumps       Past Medical History:   Diagnosis Date    Acute reaction to stress     Closed fracture of left wrist     Diastolic dysfunction     Fatty liver     GERD (gastroesophageal reflux disease)     Hyperlipidemia     Hypertension     diastolic dysfunction    Hypoglycemia     Kidney stones     11/16 for yrs; spon passage always so far    RSD (reflex sympathetic dystrophy) 8/2009    no blood pressure or sticks in left arm    Syncope 2000    no recurrence since; was orthostatic at that time    Unspecified adverse effect of anesthesia     hard to sedate       Family History   Problem Relation Age of Onset    Diabetes Maternal Aunt     Cancer Maternal Uncle         throat    Cancer Maternal Grandmother         melanoma    Heart Disease Other     Heart Attack Paternal Grandmother 48    Hypertension Mother     Cancer Maternal Grandfather         Melanoma    Stroke Neg Hx        Social History     Tobacco Use    Smoking status: Former Smoker     Packs/day: 0.50     Years: 11.00     Pack years: 5.50     Last attempt to quit: 2003     Years since quittin.4    Smokeless tobacco: Never Used   Substance Use Topics    Alcohol use: Yes     Alcohol/week: 0.6 oz     Types: 1 Glasses of wine per week     Frequency: Monthly or less     Drinks per session: 1 or 2     Comment: rarely- 3-4 drinks/yr    Drug use: No        Current Outpatient Medications   Medication Sig    losartan (COZAAR) 100 mg tablet TAKE 1 TABLET BY MOUTH DAILY    labetalol (NORMODYNE) 200 mg tablet TAKE ONE TABLET BY MOUTH 2 TIMES A DAY    acetaminophen (TYLENOL) 325 mg tablet Take  by mouth every four (4) hours as needed for Pain.  rosuvastatin (CRESTOR) 5 mg tablet Take 1 Tab by mouth nightly.  furosemide (LASIX) 20 mg tablet TAKE 1 TABLET BY MOUTH DAILY AS NEEDED    cholecalciferol, VITAMIN D3, (VITAMIN D3) 5,000 unit tab tablet Take  by mouth daily.  letrozole (FEMARA) 2.5 mg tablet Take 2.5 mg by mouth daily.  albuterol (PROVENTIL HFA, VENTOLIN HFA, PROAIR HFA) 90 mcg/actuation inhaler Take 1 Puff by inhalation every four (4) hours as needed for Wheezing.  Dexlansoprazole (DEXILANT) 60 mg CpDB Take  by mouth Daily (before breakfast).  cycloSPORINE (RESTASIS) 0.05 % ophthalmic emulsion Administer 1 Drop to both eyes two (2) times a day.  aluminum hydrox-magnesium carb (GAVISCON EXTRA STRENGTH) 160-105 mg chew Take 2 Tabs by mouth as needed. No current facility-administered medications for this visit. Past Surgical History:   Procedure Laterality Date    HX CARPAL TUNNEL RELEASE      HX GYN      lap. fibroid removal    HX HEART CATHETERIZATION   approx    normal coronaries per pt    HX MASTECTOMY Right 2017    RIGHT BREAST LUMPECTOMY WITH NEEDLE LOCALIZATION Richard Haywood LYMPH NODE BIOPSY  performed by Ileana Ferris MD at [address] [facility] HX ORTHOPAEDIC      ORIF left wrist    HX SHOULDER ARTHROSCOPY Left        Diagnostic Studies:   Old records reviewed and show as follows  EKG tracings reviewed by me today. No flowsheet data found. 11/16 Nuc Stress  Conclusion:    1. Normal perfusion scan.    2. Normal wall motion and ejection fraction.    3. Low risk scan. Visit Vitals  /77   Pulse 76   Ht 5' 1\" (1.549 m)   Wt 83.3 kg (183 lb 9.6 oz)   BMI 34.69 kg/m²       Ms. Fernanda Lerner has a reminder for a \"due or due soon\" health maintenance. I have asked that she contact her primary care provider for follow-up on this health maintenance. Physical Exam   Constitutional: She is oriented to person, place, and time. She appears well-developed and well-nourished. No distress. obese   HENT:   Head: Normocephalic and atraumatic. Mouth/Throat: Normal dentition. Eyes: Right eye exhibits no discharge. Left eye exhibits no discharge. No scleral icterus. Neck: Neck supple. No JVD present. Carotid bruit is not present. No thyromegaly present. Cardiovascular: Normal rate, regular rhythm, S1 normal, S2 normal, normal heart sounds and intact distal pulses. Exam reveals no gallop and no friction rub. No murmur heard. Pulmonary/Chest: Effort normal and breath sounds normal. She has no wheezes. She has no rales. Abdominal: Soft. She exhibits no mass. There is no tenderness. Musculoskeletal: She exhibits no edema. Lymphadenopathy:        Right cervical: No superficial cervical adenopathy present. Left cervical: No superficial cervical adenopathy present. Neurological: She is alert and oriented to person, place, and time. Skin: Skin is warm and dry. No rash noted. Psychiatric: She has a normal mood and affect. Her behavior is normal.       ASSESSMENT and PLAN    I have reviewed/discussed the above normal BMI with the patient. I have recommended the following interventions: dietary management education, guidance, and counseling, encourage exercise and monitor weight . AAC CVD risk is calculated at 9.2% over 10 years. She has tried different statins and cannot tolerate.   She is also try to lose weight. She tries to follow diet but is not very regular about her exercise. She has significant joint pains and history of breast cancer and takes Femara. Zuly Jerry HLD : Results for Bhavani Fernandez (MRN [id number]) as of 5/24/2019 11:11   Ref. Range 5/23/2019 08:06   Triglyceride Latest Ref Range: <150 MG/   Cholesterol, total Latest Ref Range: <200 MG/   HDL Cholesterol Latest Ref Range: 40 - 60 MG/DL 59   CHOL/HDL Ratio Latest Ref Range: 0 - 5.0   3.2   LDL, calculated Latest Ref Range: 0 - 100 MG/.2 (H)   VLDL, calculated Latest Units: MG/DL 26.8       NYHA2    Chest pain appears to be musculoskeletal and is being worked up by PCP. LDL has reduced well with Crestor once or twice a week. We will try a little higher dose of 10 mg once or twice a week and repeat lipids in 6 months. Check home BP records as home BPs are 895 275 systolic. Diagnoses and all orders for this visit:    1. Pure hypercholesterolemia  -     rosuvastatin (CRESTOR) 10 mg tablet; Take 1 Tab by mouth nightly. -     LIPID PANEL; Future  -     HEPATIC FUNCTION PANEL; Future    2. Essential hypertension    3. Chronic diastolic congestive heart failure (HCC)    4. Obesity (BMI 30.0-34. 9)        Pertinent laboratory and test data reviewed and discussed with patient. See patient instructions also for other medical advice given    Medications Discontinued During This Encounter   Medication Reason    rosuvastatin (CRESTOR) 5 mg tablet        Follow-up and Dispositions    · Return in about 1 year (around 5/24/2020), or if symptoms worsen or fail to improve, for with ekg.

## 2019-05-24 NOTE — PROGRESS NOTES
1. Have you been to the ER, urgent care clinic since your last visit? Hospitalized since your last visit?     no    2. Have you seen or consulted any other health care providers outside of the [address] [place] [address] since your last visit? Include any pap smears or colon screening. Yes When: April Where: [address] [facility] Reason for visit: RAMAN mcclendon     3. Since your last visit, have you had any of the following symptoms?      n0          4. Have you had any blood work, X-rays or cardiac testing? Yes When: X 1 day ago Where: [facility]     Requested: YES     In [facility]: YES    5. Where do you normally have your labs drawn? No      6. Do you need any refills today?    No

## 2019-05-31 ENCOUNTER — OFFICE VISIT (OUTPATIENT)
Dept: FAMILY MEDICINE CLINIC | Facility: CLINIC | Age: 69
End: 2019-05-31

## 2019-05-31 VITALS
RESPIRATION RATE: 16 BRPM | HEART RATE: 84 BPM | WEIGHT: 183 LBS | SYSTOLIC BLOOD PRESSURE: 125 MMHG | TEMPERATURE: 97.4 F | BODY MASS INDEX: 34.55 KG/M2 | OXYGEN SATURATION: 98 % | HEIGHT: 61 IN | DIASTOLIC BLOOD PRESSURE: 74 MMHG

## 2019-05-31 DIAGNOSIS — M54.9 UPPER BACK PAIN: ICD-10-CM

## 2019-05-31 DIAGNOSIS — E78.00 PURE HYPERCHOLESTEROLEMIA: ICD-10-CM

## 2019-05-31 DIAGNOSIS — Z17.0 MALIGNANT NEOPLASM OF UPPER-OUTER QUADRANT OF RIGHT BREAST IN FEMALE, ESTROGEN RECEPTOR POSITIVE (HCC): ICD-10-CM

## 2019-05-31 DIAGNOSIS — I10 ESSENTIAL HYPERTENSION: Primary | ICD-10-CM

## 2019-05-31 DIAGNOSIS — C50.411 MALIGNANT NEOPLASM OF UPPER-OUTER QUADRANT OF RIGHT BREAST IN FEMALE, ESTROGEN RECEPTOR POSITIVE (HCC): ICD-10-CM

## 2019-05-31 DIAGNOSIS — R07.81 RIB PAIN ON RIGHT SIDE: ICD-10-CM

## 2019-05-31 NOTE — PROGRESS NOTES
The patient presents to the office today with the chief complaint of Right Rib Pain    HPI    The patient complains of 6 months of severe pain localized to her right anterior rib. The pain is quiet if the patient is totally quiet. But with almost any movement the pain happens and is severe. With certain movements the pain is excruciating. A bone scan of that area is reported as negative. On this bone scan the patient did have some activity upper thoracic spine most likely from arthritis but but in the setting of breast cancer metastasis cannot be ruled out. The patient tried a course of steroids which did not help with the rib pain at all. The patient is intolerant of nonsteroidal anti-inflammatory. Patient remains on from [facility] for breast cancer of the right breast.  Patient complains of continued bone aching which she attributes to the from air. The patient remains on Crestor for hyperlipidemia. The patient is poorly tolerant of the drug at full doses. The patient is using the medication once daily at present. The patient remains on losartan and labetalol for hypertension. She is doing well on these medications. ROS  Positive for rib and upper back pain as per HPI. Negative for dyspnea or lower extremity edema    Allergies   Allergen Reactions    Lipitor [Atorvastatin] Myalgia     Quit 2/17    Nsaids (Non-Steroidal Anti-Inflammatory Drug) Other (comments)     Severe abdominal pain    Pcn [Penicillins] Swelling     Swelling, hives & photosensitive rxn    Statins-Hmg-Coa Reductase Inhibitors Other (comments)     Lag crumps       Current Outpatient Medications   Medication Sig Dispense Refill    rosuvastatin (CRESTOR) 10 mg tablet Take 1 Tab by mouth nightly.  90 Tab 1    losartan (COZAAR) 100 mg tablet TAKE 1 TABLET BY MOUTH DAILY 90 Tab 0    labetalol (NORMODYNE) 200 mg tablet TAKE ONE TABLET BY MOUTH 2 TIMES A  Tab 2    acetaminophen (TYLENOL) 325 mg tablet Take  by mouth every four (4) hours as needed for Pain.  furosemide (LASIX) 20 mg tablet TAKE 1 TABLET BY MOUTH DAILY AS NEEDED 30 Tab 0    cholecalciferol, VITAMIN D3, (VITAMIN D3) 5,000 unit tab tablet Take  by mouth daily.  letrozole (FEMARA) 2.5 mg tablet Take 2.5 mg by mouth daily.  Dexlansoprazole (DEXILANT) 60 mg CpDB Take  by mouth Daily (before breakfast).  cycloSPORINE (RESTASIS) 0.05 % ophthalmic emulsion Administer 1 Drop to both eyes two (2) times a day.          Past Medical History:   Diagnosis Date    Acute reaction to stress     Closed fracture of left wrist     Diastolic dysfunction     Fatty liver     GERD (gastroesophageal reflux disease)     Hyperlipidemia     Hypertension     diastolic dysfunction    Hypoglycemia     Kidney stones     11/16 for yrs; spon passage always so far    RSD (reflex sympathetic dystrophy) 8/2009    no blood pressure or sticks in left arm    Syncope 2000    no recurrence since; was orthostatic at that time    Unspecified adverse effect of anesthesia     hard to sedate       Past Surgical History:   Procedure Laterality Date    HX CARPAL TUNNEL RELEASE      HX GYN      lap. fibroid removal    HX HEART CATHETERIZATION  2005 approx    normal coronaries per pt    HX MASTECTOMY Right 11/30/2017    RIGHT BREAST LUMPECTOMY WITH NEEDLE LOCALIZATION Siobhan Client LYMPH NODE BIOPSY  performed by Ritchie Gamez MD at [facility] MAIN OR    HX ORTHOPAEDIC      ORIF left wrist    HX SHOULDER ARTHROSCOPY Left 1988       Social History     Socioeconomic History    Marital status:      Spouse name: Not on file    Number of children: Not on file    Years of education: Not on file    Highest education level: Not on file   Occupational History    Not on file   Social Needs    Financial resource strain: Not on file    Food insecurity:     Worry: Not on file     Inability: Not on file    Transportation needs:     Medical: Not on file     Non-medical: Not on file Tobacco Use    Smoking status: Former Smoker     Packs/day: 0.50     Years: 11.00     Pack years: 5.50     Last attempt to quit: 2003     Years since quittin.4    Smokeless tobacco: Never Used   Substance and Sexual Activity    Alcohol use: Yes     Alcohol/week: 0.6 oz     Types: 1 Glasses of wine per week     Frequency: Monthly or less     Drinks per session: 1 or 2     Comment: rarely- 3-4 drinks/yr    Drug use: No    Sexual activity: Not Currently   Lifestyle    Physical activity:     Days per week: Not on file     Minutes per session: Not on file    Stress: Not on file   Relationships    Social connections:     Talks on phone: Not on file     Gets together: Not on file     Attends Mu-ism service: Not on file     Active member of club or organization: Not on file     Attends meetings of clubs or organizations: Not on file     Relationship status: Not on file    Intimate partner violence:     Fear of current or ex partner: Not on file     Emotionally abused: Not on file     Physically abused: Not on file     Forced sexual activity: Not on file   Other Topics Concern    Not on file   Social History Narrative    Not on file       Patient does not have an advanced directive on file    Visit Vitals  /74   Pulse 84   Temp 97.4 °F (36.3 °C) (Tympanic)   Resp 16   Ht 5' 1\" (1.549 m)   Wt 183 lb (83 kg)   SpO2 98%   BMI 34.58 kg/m²       Physical Exam   Neck: Carotid bruit is not present. Cardiovascular: Normal rate. Exam reveals no gallop. No murmur heard. Pulmonary/Chest: She has no wheezes. She has no rales. Abdominal: Soft. Bowel sounds are normal. She exhibits no distension. Musculoskeletal: She exhibits no edema. BMI: The BMI is high. This is not addressed during this visit secondary to several ongoing medical problems including cancer.     Hospital Outpatient Visit on 2019   Component Date Value Ref Range Status    LIPID PROFILE 2019        Final    Cholesterol, total 05/23/2019 186  <200 MG/DL Final    Triglyceride 05/23/2019 134  <150 MG/DL Final    Comment: The drugs N-acetylcysteine (NAC) and  Metamiszole have been found to cause falsely  low results in this chemical assay. Please  be sure to submit blood samples obtained  BEFORE administration of either of these  drugs to assure correct results.  HDL Cholesterol 05/23/2019 59  40 - 60 MG/DL Final    LDL, calculated 05/23/2019 100.2* 0 - 100 MG/DL Final    VLDL, calculated 05/23/2019 26.8  MG/DL Final    CHOL/HDL Ratio 05/23/2019 3.2  0 - 5.0   Final    Protein, total 05/23/2019 6.3* 6.4 - 8.2 g/dL Final    Albumin 05/23/2019 3.7  3.4 - 5.0 g/dL Final    Globulin 05/23/2019 2.6  2.0 - 4.0 g/dL Final    A-G Ratio 05/23/2019 1.4  0.8 - 1.7   Final    Bilirubin, total 05/23/2019 0.3  0.2 - 1.0 MG/DL Final    Bilirubin, direct 05/23/2019 <0.1  0.0 - 0.2 MG/DL Final    Alk. phosphatase 05/23/2019 55  45 - 117 U/L Final    AST (SGOT) 05/23/2019 21  15 - 37 U/L Final    ALT (SGPT) 05/23/2019 30  13 - 56 U/L Final       .No results found for any visits on 05/31/19. Assessment / Plan      ICD-10-CM ICD-9-CM    1. Essential hypertension I10 401.9    2. Malignant neoplasm of upper-outer quadrant of right breast in female, estrogen receptor positive ([facility] C50.411 174.4     Z17.0 V86.0    3. Rib pain on right side R07.81 786.50    4. Upper back pain M54.9 724.5    5. Pure hypercholesterolemia E78.00 272.0        A difficult problem of localized pain in setting of breast cancer and generalized aching which may be from from air and or Crestor. I will review the current radiographic studies with radiology and obtain advice on any future radiographic studies         We will hold the Crestor for now follow symptoms         Discussed from air and possible side effects of aching from from air.   The patient is working with this with her oncologist.  she was advised to continue her maintenance medications      Follow-up and Dispositions    · Return in about 6 weeks (around 7/12/2019). I asked Manas Lester if she has any questions and I answered the questions. Manas Lester states that she understands the treatment plan and agrees with the treatment plan          THIS DOCUMENT WAS CREATED WITH A VOICE ACTIVATED DICTATION SYSTEM.   IT MAY CONTAIN TRANSCRIPTION ERRORS

## 2019-07-11 RX ORDER — LOSARTAN POTASSIUM 100 MG/1
TABLET ORAL
Qty: 30 TAB | Refills: 0 | Status: SHIPPED | OUTPATIENT
Start: 2019-07-11 | End: 2019-08-19 | Stop reason: SDUPTHER

## 2019-08-19 RX ORDER — LOSARTAN POTASSIUM 100 MG/1
TABLET ORAL
Qty: 90 TAB | Refills: 0 | Status: SHIPPED | OUTPATIENT
Start: 2019-08-19 | End: 2019-11-14 | Stop reason: SDUPTHER

## 2019-09-21 ENCOUNTER — HOSPITAL ENCOUNTER (OUTPATIENT)
Dept: CT IMAGING | Age: 69
Discharge: HOME | End: 2019-09-21
Attending: INTERNAL MEDICINE
Payer: MEDICARE

## 2019-09-21 DIAGNOSIS — C50.411 MALIGNANT NEOPLASM OF UPPER-OUTER QUADRANT OF RIGHT FEMALE BREAST (HCC): ICD-10-CM

## 2019-09-21 LAB — CREAT UR-MCNC: 0.9 MG/DL (ref 0.6–1.3)

## 2019-09-21 PROCEDURE — 71260 CT THORAX DX C+: CPT

## 2019-09-21 PROCEDURE — 74011636320 HC RX REV CODE- 636/320: Performed by: INTERNAL MEDICINE

## 2019-09-21 PROCEDURE — 82565 ASSAY OF CREATININE: CPT

## 2019-09-21 RX ADMIN — IOPAMIDOL 80 ML: 612 INJECTION, SOLUTION INTRAVENOUS at 08:50

## 2019-10-01 ENCOUNTER — HOSPITAL ENCOUNTER (OUTPATIENT)
Dept: MAMMOGRAPHY | Age: 69
Discharge: HOME | End: 2019-10-01
Payer: MEDICARE

## 2019-10-01 DIAGNOSIS — Z85.3 PERSONAL HISTORY OF BREAST CANCER: ICD-10-CM

## 2019-10-01 PROCEDURE — 77062 BREAST TOMOSYNTHESIS BI: CPT

## 2019-10-09 ENCOUNTER — OFFICE VISIT (OUTPATIENT)
Dept: SURGERY | Age: 69
End: 2019-10-09

## 2019-10-09 VITALS — RESPIRATION RATE: 16 BRPM | WEIGHT: 183 LBS | HEIGHT: 61 IN | BODY MASS INDEX: 34.55 KG/M2

## 2019-10-09 DIAGNOSIS — Z85.3 PERSONAL HISTORY OF BREAST CANCER: Primary | ICD-10-CM

## 2019-10-09 NOTE — PROGRESS NOTES
Progress Note    Patient: Ashly Diamond  MRN: [id number]  SSN: [id number]   YOB: 1950  Age: 77 y.o. Sex: female     Chief Complaint   Patient presents with    Follow-up     mammo birads 3       HPI    Ms. Aniyah Sidhu is a 76-year-old lady I followed after a right breast cancer in 2017. This was a T1b N 0 M0 hormonally positive cancer. She is done extremely well since then although she has developed some thoracic tenderness likely from her radiation that is a little bit debilitating. Is quite tender when she moves in certain directions. This is on the lateral aspect of her chest.  Steroid Dosepaks were unhelpful and she manages with it but it is significant.   Past Medical History:   Diagnosis Date    Acute reaction to stress     [facility] cancer ([facility] [address]     Closed fracture of left wrist     Diastolic dysfunction     Fatty liver     GERD (gastroesophageal reflux disease)     Hyperlipidemia     Hypertension     diastolic dysfunction    Hypoglycemia     Kidney stones     11/16 for yrs; spon passage always so far    Radiation therapy complication     RSD (reflex sympathetic dystrophy) 8/2009    no blood pressure or sticks in left arm    Syncope 2000    no recurrence since; was orthostatic at that time    Unspecified adverse effect of anesthesia     hard to sedate     Past Surgical History:   Procedure Laterality Date    HX CARPAL TUNNEL RELEASE      HX GYN      lap. fibroid removal    HX HEART CATHETERIZATION  2005 approx    normal coronaries per pt    HX MASTECTOMY Right 11/30/2017    RIGHT BREAST LUMPECTOMY WITH NEEDLE LOCALIZATION Yvonne Mcintyre LYMPH NODE BIOPSY  performed by Chantal Sparks MD at [address] [place] [address] HX ORTHOPAEDIC      ORIF left wrist    HX SHOULDER ARTHROSCOPY Left 1988     Allergies   Allergen Reactions    Lipitor [Atorvastatin] Myalgia     Quit 2/17    Nsaids (Non-Steroidal Anti-Inflammatory Drug) Other (comments)     Severe abdominal pain    Pcn [Penicillins] Swelling Swelling, hives & photosensitive rxn    Statins-Hmg-Coa Reductase Inhibitors Other (comments)     Lag crumps     Current Outpatient Medications   Medication Sig Dispense Refill    losartan (COZAAR) 100 mg tablet TAKE 1 TABLET BY MOUTH EVERY DAY 90 Tab 0    rosuvastatin (CRESTOR) 10 mg tablet Take 1 Tab by mouth nightly. 90 Tab 1    losartan (COZAAR) 100 mg tablet TAKE 1 TABLET BY MOUTH DAILY 90 Tab 0    labetalol (NORMODYNE) 200 mg tablet TAKE ONE TABLET BY MOUTH 2 TIMES A  Tab 2    acetaminophen (TYLENOL) 325 mg tablet Take  by mouth every four (4) hours as needed for Pain.  furosemide (LASIX) 20 mg tablet TAKE 1 TABLET BY MOUTH DAILY AS NEEDED 30 Tab 0    cholecalciferol, VITAMIN D3, (VITAMIN D3) 5,000 unit tab tablet Take  by mouth daily.  letrozole (FEMARA) 2.5 mg tablet Take 2.5 mg by mouth daily.  Dexlansoprazole (DEXILANT) 60 mg CpDB Take  by mouth Daily (before breakfast).  cycloSPORINE (RESTASIS) 0.05 % ophthalmic emulsion Administer 1 Drop to both eyes two (2) times a day. Social History     Socioeconomic History    Marital status:      Spouse name: Not on file    Number of children: Not on file    Years of education: Not on file    Highest education level: Not on file   Occupational History    Not on file   Social Needs    Financial resource strain: Not on file    Food insecurity:     Worry: Not on file     Inability: Not on file    Transportation needs:     Medical: Not on file     Non-medical: Not on file   Tobacco Use    Smoking status: Former Smoker     Packs/day: 0.50     Years: 11.00     Pack years: 5.50     Last attempt to quit: 2003     Years since quittin.7    Smokeless tobacco: Never Used   Substance and Sexual Activity    Alcohol use: Yes     Alcohol/week: 1.0 standard drinks     Types: 1 Glasses of wine per week     Frequency: Monthly or less     Drinks per session: 1 or 2     Comment: rarely- 3-4 drinks/yr    Drug use:  No  Sexual activity: Not Currently   Lifestyle    Physical activity:     Days per week: Not on file     Minutes per session: Not on file    Stress: Not on file   Relationships    Social connections:     Talks on phone: Not on file     Gets together: Not on file     Attends Mosque service: Not on file     Active member of club or organization: Not on file     Attends meetings of clubs or organizations: Not on file     Relationship status: Not on file    Intimate partner violence:     Fear of current or ex partner: Not on file     Emotionally abused: Not on file     Physically abused: Not on file     Forced sexual activity: Not on file   Other Topics Concern    Not on file   Social History Narrative    Not on file     Family History   Problem Relation Age of Onset    Diabetes Maternal Aunt     Cancer Maternal Uncle         throat    Cancer Maternal Grandmother         melanoma    Heart Disease Other     Heart Attack Paternal Grandmother 48    Hypertension Mother     Cancer Maternal Grandfather         Melanoma    Stroke Neg Hx          Review of systems:  Patient denies any reflux, emesis, abdominal pain, change in bowel habits, hematochezia, melena, fever, weight loss, fatigue chills, dermatitis, abnormal moles, change in vision, vertigo, epistaxis, dysphagia, hoarseness, chest pain, palpitations, hypertension, edema, cough, shortness of breath, wheezing, hemoptysis, snoring, hematuria, diabetes, thyroid disease, anemia, bruising, history of blood transfusion, dizziness, headache, or fainting.     Physical Examination    Well developed well nourished female in no apparent distress  Visit Vitals  Resp 16   Ht 5' 1\" (1.549 m)   Wt 83 kg (183 lb)   BMI 34.58 kg/m²      Head: normocephalic, atraumatic  Mouth: Clear, no overt lesions, oral mucosa pink and moist  Neck: supple, no masses, no adenopathy or carotid bruits, trachea midline  Resp: clear to auscultation bilaterally, no wheeze, rhonchi or rales, excursions normal and symmetrical  Cardio: Regular rate and rhythm, no murmurs, clicks, gallops or rubs, no edema or varicosities  Abdomen: soft, nontender, nondistended, normoactive bowel sounds, no hernias, no hepatosplenomegaly,   Back: Deferred  Extremeties: warm, well-perfused, no tenderness or swelling, normal gait/station  Neuro: sensation and strength grossly intact and symmetrical  Psych: alert and oriented to person, place and time  Breast exam bilateral breast exam without dominant mass, skin change, nipple discharge, or lymphadenopathy. Small stable puckering at her old wound site    IMPRESSION  Early stage right breast cancer now without evidence of disease most 2 years on    PLAN  No orders of the defined types were placed in this encounter.     Follow-up in 6 months with mammogram.  Referred to Dr. Rashmi Gallo for follow-up in my absence  Branden Rodriguez MD

## 2019-10-17 ENCOUNTER — HOSPITAL ENCOUNTER (OUTPATIENT)
Dept: PHYSICAL THERAPY | Age: 69
Discharge: HOME | End: 2019-10-17
Payer: MEDICARE

## 2019-10-17 PROCEDURE — 97161 PT EVAL LOW COMPLEX 20 MIN: CPT

## 2019-10-17 PROCEDURE — 97110 THERAPEUTIC EXERCISES: CPT

## 2019-10-17 NOTE — PROGRESS NOTES
[facility]  [facility] [employer]  [place]AI[place]  [address] [place] [address]  [phone] fax    Plan of Care/ Statement of Necessity for Physical Therapy Services    Patient name: Lovett Gowers Start of Care: 10/17/2019   [facility] 1950    Medical Diagnosis: No admission diagnoses are documented for this encounter. Payor: Abilio Spring / Plan: [place] MEDICARE PART A & B / Product Type: Medicare /  Onset Date:summer 2018    Treatment Diagnosis: thoracic pain   Prior Hospitalization: see medical history Provider#: [id number]   Medications: Verified on Patient summary List    Comorbidities: breast CA with right lumpectomy 2017 and radiation until 2018; OA; osteopenia; RSD left wrist; ORIF left radius; HTN   Prior Level of Function: no right rib pain when reaching behind back or rolling over in bed      The Plan of Care and following information is based on the information from the initial evaluation. Assessment / key information: Patient is a RHD 77 y.o. female who presents to [facility] with a diagnosis of No admission diagnoses are documented for this encounter. . Patient is her own historian. Living situation is as follows: with family in [place] [address]. Occupation: retired RN. Pt presents with c/o localized pain along the right T7 and T8 dermatomes which has been present since the summer of 2018, approximately 6 months after starting femara and undergoing radiation for right breat CA s/p lumpectomy in 2017. Pain is intermittent in nature and positional. Pt experiences \"crushing pain\" which lasts approximately 5 minutes with right UE horizontal abduction and extension as if reaching behind her and into the back seat of the car. She experiences the pain when rolling in bed. She is guarded and is hesitant to perform any movements that may \"trigger it. \" Pt had a mammogram 2 weeks ago which she states aggravated the condition.  No red flags-numbness/parestheias/chest pain/SOB/diaphoresis. Pt reports having a palpable axillary cord on the right months ago which she \"massaged out\", after radiation. No c/o shoulder pain. 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 She continues to f/u with oncology. Current Deficits include: pain, edema, decreased mobility, decreased strength, and decreased postural awareness with resulting limitations in ADL's and in functional abilities. Impairments are as follows:   Pain 0/10 but can increase to a 10/10 with extension and RUE horizontal abduction; +TTP right facets of T7 and T8   Posture forward head, rounded shoulders, left lumbar and right thoracic scoliosis with elevated right ilium and left unilateral side flexion of sacrum, BLE genu varum and mild RLE calcaneal valgus  Observations guarded position of right UE; UT substitution with excessive upward rotation with shoulder elevation; reduced and painful left rotation in T6-8 vertebrae   AROM/PROM   Active Movements: T-S  ROM  AROM Comments:pain, area   Forward flexion:      Extension     SB right      SB left      Rotation right 10    Rotation left 20    GH AROM/PROM:  Flexion L 150/155 R 140/145  Extension L40/50 R 30/34  Strength   Mid traps L 3+ R 3-  Low traps bilaterally 3-/5  Special Tests   Negative shoulder testing  + decreased mobility into thoracic left rotation at T6-8 segments and hypomobility into PA glides  Functional Deficits include: rolling; reaching into back seat of car. Patient's FOTO score was a 61/100 indicating decreased function. Patient will benefit from a POC addressing such impairments and limitations in order to improve quality of life and return to PLOF.     Evaluation Complexity History HIGH Complexity :3+ comorbidities / personal factors will impact the outcome/ POC ; Examination MEDIUM Complexity : 3 Standardized tests and measures addressing body structure, function, activity limitation and / or participation in recreation  ;Presentation LOW Complexity : Stable, uncomplicated  ;Clinical Decision Making MEDIUM Complexity : FOTO score of 26-74  Overall Complexity Rating: LOW   Problem List: pain affecting function, decrease ROM, decrease strength, decrease ADL/ functional abilitiies, decrease activity tolerance and decrease flexibility/ joint mobility     Treatment Plan may include any combination of the following: Therapeutic exercise, Therapeutic activities, Neuromuscular re-education, Physical agent/modality, Manual therapy, Patient education and Other: use therapist discretion with modalities secondary to breast CA    Patient / Family readiness to learn indicated by: asking questions  Persons(s) to be included in education: patient (P)  Barriers to Learning/Limitations: None  Patient Goal (s): get better  Patient Self Reported Health Status: excellent  Rehabilitation Potential: excellent     Short Term Goals: To be accomplished in  2  treatments:  1. Pt will be compliant with HEP for symptom management at home.  Long Term Goals: To be accomplished in  8-12  treatments:  1. Pt will demonstrate an increased FOTO score to 68/100 in order to improve function. 2. Pt will be independent with HEP at D/C for self management. 3. Pt will demonstrate increased right [address] extension to > 40* without pain in order to assist with reaching into her back seat with decreased pain  4. Pt will demonstrate increased bilateral thoracic rotation to >25* in order to assist with mobility and functional reaching  5. Pt will demonstrate increased right lower trap strength and mid trap strength to > or equal to 3+/5 in order to assist with scapular stabilization with functional overhead activities     Frequency / Duration: Patient to be seen 2 times per week for 8-12 treatments.     Patient/ Caregiver education and instruction: Diagnosis, prognosis, exercises   [x]  Plan of care has been reviewed with PTA    The severity rating is based on clinical judgment and the FOTO score. Certification Period:   Margie Braun, PT 10/17/2019 8:04 AM    ________________________________________________________________________    I certify that the above Therapy Services are being furnished while the patient is under my care. I agree with the treatment plan and certify that this therapy is necessary.     Physician's Signature:____________Date:_________TIME:________    ** Signature, Date and Time must be completed for valid certification **    Please sign and return to [facility]  [facility] [employer]  [address] [place] [address]  [phone] fax

## 2019-10-17 NOTE — PROGRESS NOTES
PT THORACIC EVAL AND TREATMENT     Patient Name: Mya Galo  Date:10/17/2019  : 1950  [x]  Patient  Verified  Payor: [place] MEDICARE / Plan: [place] MEDICARE PART A & B / Product Type: Medicare /    In time:1015  Out time:1100  Total Treatment Time (min): 45  Total Timed Codes (min): 10  1:1 Treatment Time ( only): 39   Visit #: 1 of     Treatment Area: Nerve pain [M79.2]    SUBJECTIVE  Pain Level (0-10 on visual analog scale): 0  Any medication changes, allergies to medications, diagnosis change, or new procedure performed: see summary sheet for update  Subjective functional status/changes  Assessment / key information: Patient is a RHD 77 y.o. female who presents to [facility] with a diagnosis of No admission diagnoses are documented for this encounter. . Patient is her own historian. Living situation is as follows: with family in [place] [address]. Occupation: retired RN. Pt presents with c/o localized pain along the right T7 and T8 dermatomes which has been present since the summer of 2018, approximately 6 months after starting femara and undergoing radiation for right breat CA s/p lumpectomy in 2017. Pain is intermittent in nature and positional. Pt experiences \"crushing pain\" which lasts approximately 5 minutes with right UE horizontal abduction and extension as if reaching behind her and into the back seat of the car. She experiences the pain when rolling in bed. She is guarded and is hesitant to perform any movements that may \"trigger it. \" Pt had a mammogram 2 weeks ago which she states aggravated the condition. No red flags-numbness/parestheias/chest pain/SOB/diaphoresis. Pt reports having a palpable axillary cord on the right months ago which she \"massaged out\", after radiation. No c/o shoulder pain. 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  She continues to f/u with oncology. Current Deficits include: pain, edema, decreased mobility, decreased strength, and decreased postural awareness with resulting limitations in ADL's and in functional abilities.    Impairments are as follows:   Pain 0/10 but can increase to a 10/10 with extension and RUE horizontal abduction; +TTP right facets of T7 and T8   Posture forward head, rounded shoulders, left lumbar and right thoracic scoliosis with elevated right ilium and left unilateral side flexion of sacrum, BLE genu varum and mild RLE calcaneal valgus  Observations guarded position of right UE; UT substitution with excessive upward rotation with shoulder elevation; reduced and painful left rotation in T6-8 vertebrae   AROM/PROM   Active Movements: T-S  ROM  AROM Comments:pain, area   Forward flexion:      Extension     SB right      SB left      Rotation right 10    Rotation left 20    GH AROM/PROM:  Flexion L 150/155 R 140/145  Extension L40/50 R 30/34  Strength   Mid traps L 3+ R 3-  Low traps bilaterally 3-/5  Special Tests   Negative shoulder testing  + decreased mobility into thoracic left rotation at T6-8 segments and hypomobility into PA glides    TREATMENT  10 min Therapeutic Exercise:  [x]  See flow sheet   Rationale:      increase ROM and increase strength to improve the patients ability to reach behind her and perform ADLs      min Therapeutic Activity: [x]  See flow sheet   Rationale:     to improve the patients ability to     Billed With/As:   [x] TE   [] TA   [] Neuro   [] Self Care Patient Education: [x] Review HEP    [] Progressed/Changed HEP based on:   [x] positioning   [x] body mechanics   [x] transfers   [] heat/ice application    [] other:      Pain Level (0-10 scale) post treatment: 0    ASSESSMENT  [x]  See Plan of Care    PLAN  [x]  Upgrade activities as tolerated     [x] Other:_    See POC for Frequency/duration of visits       Jose Angel Maki, PT 10/17/2019  12:59 PM

## 2019-10-28 ENCOUNTER — CLINICAL SUPPORT (OUTPATIENT)
Dept: FAMILY MEDICINE CLINIC | Facility: CLINIC | Age: 69
End: 2019-10-28

## 2019-10-28 DIAGNOSIS — Z23 ENCOUNTER FOR IMMUNIZATION: Primary | ICD-10-CM

## 2019-11-06 ENCOUNTER — HOSPITAL ENCOUNTER (OUTPATIENT)
Dept: PHYSICAL THERAPY | Age: 69
Discharge: HOME | End: 2019-11-06
Payer: MEDICARE

## 2019-11-06 PROCEDURE — 97110 THERAPEUTIC EXERCISES: CPT

## 2019-11-06 NOTE — PROGRESS NOTES
PT DAILY TREATMENT NOTE 10-18    Patient Name: Thomas Ndiaye  Date:2019  : 1950  [x]  Patient  Verified  Payor: VA MEDICARE / Plan: VA MEDICARE PART A & B / Product Type: Medicare /    In EQXT:[id number]  Out time:1110  Total Treatment Time (min): 38  Visit #: 2 of     Medicare/BCBS Only   Total Timed Codes (min):  38 1:1 Treatment Time:  28       Treatment Area: Thoracic spine pain [M54.6]    SUBJECTIVE  Pain Level (0-10 scale): 1/10  Any medication changes, allergies to medications, adverse drug reactions, diagnosis change, or new procedure performed?: [x] No    [] Yes (see summary sheet for update)  Subjective functional status/changes:   [] No changes reported  Reports the pain is stabbing and excruciating to her right rib cage.  She says it's positional and rotational .    OBJECTIVE    Modality rationale: decrease pain to improve the patients ability to ease with aDLs'   Min Type Additional Details    [] Estim:  []Unatt       []IFC  []Premod                        []Other:  []w/ice   []w/heat  Position:  Location:    [] Estim: []Att    []TENS instruct  []NMES                    []Other:  []w/US   []w/ice   []w/heat  Position:  Location:    []  Traction: [] Cervical       []Lumbar                       [] Prone          []Supine                       []Intermittent   []Continuous Lbs:  [] before manual  [] after manual    []  Ultrasound: []Continuous   [] Pulsed                           []1MHz   []3MHz W/cm2:  Location:    []  Iontophoresis with dexamethasone         Location: [] Take home patch   [] In clinic   10 [x]  Ice     []  heat  []  Ice massage  []  Laser   []  Anodyne Position:supine  Location:right trunk/ribs    []  Laser with stim  []  Other:  Position:  Location:    []  Vasopneumatic Device Pressure:       [] lo [] med [] hi   Temperature: [] lo [] med [] hi   [] Skin assessment post-treatment:  []intact []redness- no adverse reaction    []redness  adverse reaction:       28 min Therapeutic Exercise:  [] See flow sheet :   Rationale: increase ROM and increase strength to improve the patients ability to ease withADL's        With   [] TE   [] TA   [] neuro   [] other: Patient Education: [x] Review HEP    [] Progressed/Changed HEP based on:   [] positioning   [] body mechanics   [] transfers   [] heat/ice application    [] other:      Other Objective/Functional Measures: initiated ex's, gentle stretching and pain avoidance. Pt has left wrist pain from previous surgery/RSD. Pain Level (0-10 scale) post treatment: 0/10    ASSESSMENT/Changes in Function: Pain comes on with rotational movement. She tries to avoid it because it's 32/71 with certain rotational. X rays ruled out fx to ribs. Patient will continue to benefit from skilled PT services to modify and progress therapeutic interventions, address functional mobility deficits, address ROM deficits, address strength deficits, analyze and address soft tissue restrictions, analyze and cue movement patterns, analyze and modify body mechanics/ergonomics, assess and modify postural abnormalities and address imbalance/dizziness to attain remaining goals.      [x]  See Plan of Care  []  See progress note/recertification  []  See Discharge Summary         Progress towards goals / Updated goals:      PLAN  [x]  Upgrade activities as tolerated     [x]  Continue plan of care  []  Update interventions per flow sheet       []  Discharge due to:_  []  Other:_      Karmen Matta, PT 11/6/2019  10:38 AM    Future Appointments   Date Time Provider Clifford Jensen   11/8/2019 10:00 AM Tommie Paul PT [facility]   11/13/2019 12:00 PM Tommie Paul PT [facility]   11/15/2019 10:00 AM Tommie Paul PT [facility]   11/20/2019 10:30 AM Tommie Paul PT [facility]   11/22/2019 10:00 AM Tommie Paul PT [facility]   11/25/2019 10:30 AM Tommie Paul PT [facility]   11/27/2019 10:30 AM Tommie Paul PT [facility]   12/2/2019 10:00 AM QUETA Vazquez [facility] 12/4/2019 10:00 AM Haleigh Salmeron, PT [facility]   5/22/2020 10:00 AM Javier Zepeda MD [address] [place] [address]

## 2019-11-08 ENCOUNTER — HOSPITAL ENCOUNTER (OUTPATIENT)
Dept: PHYSICAL THERAPY | Age: 69
Discharge: HOME | End: 2019-11-08
Payer: MEDICARE

## 2019-11-08 PROCEDURE — 97110 THERAPEUTIC EXERCISES: CPT

## 2019-11-08 NOTE — PROGRESS NOTES
PT DAILY TREATMENT NOTE 10-18    Patient Name: Marcie Koyanagi  Date:2019  : 1950  [x]  Patient  Verified  Payor: [place] MEDICARE / Plan: [place] MEDICARE PART A & B / Product Type: Medicare /    In time:  Out time:1047  Total Treatment Time (min): 40  Visit #: 3 of 8-    Medicare/BCBS Only   Total Timed Codes (min):  40 1:1 Treatment Time:  30       Treatment Area: Thoracic spine pain [M54.6]    SUBJECTIVE  Pain Level (0-10 scale): 0/10  Any medication changes, allergies to medications, adverse drug reactions, diagnosis change, or new procedure performed?: [x] No    [] Yes (see summary sheet for update)  Subjective functional status/changes:   [] No changes reported  Reports feeling the occasional pain     OBJECTIVE    Modality rationale: decrease pain to improve the patients ability to ease with ADL's   Min Type Additional Details    [] Estim:  []Unatt       []IFC  []Premod                        []Other:  []w/ice   []w/heat  Position:  Location:    [] Estim: []Att    []TENS instruct  []NMES                    []Other:  []w/US   []w/ice   []w/heat  Position:  Location:    []  Traction: [] Cervical       []Lumbar                       [] Prone          []Supine                       []Intermittent   []Continuous Lbs:  [] before manual  [] after manual    []  Ultrasound: []Continuous   [] Pulsed                           []1MHz   []3MHz W/cm2:  Location:    []  Iontophoresis with dexamethasone         Location: [] Take home patch   [] In clinic    []  Ice     []  heat  []  Ice massage  []  Laser   []  Anodyne Position:  Location:    []  Laser with stim  []  Other:  Position:  Location:    []  Vasopneumatic Device Pressure:       [] lo [] med [] hi   Temperature: [] lo [] med [] hi   [] Skin assessment post-treatment:  []intact []redness- no adverse reaction      30 min Therapeutic Exercise:  [] See flow sheet :   Rationale: increase ROM and increase strength to improve the patients ability to ease with ADL's    With   [] TE   [] TA   [] neuro   [] other: Patient Education: [x] Review HEP    [] Progressed/Changed HEP based on:   [] positioning   [] body mechanics   [] transfers   [] heat/ice application    [] other:      Other Objective/Functional Measures: Pain avoidance during all ex's. Progressed to new ex's w/o c/o pain and with good tolerance. Pt demo right shoulder weakness. Pain Level (0-10 scale) post treatment: 1/10    ASSESSMENT/Changes in Function: Progressing with ex's issued so far. Pt will be issued a green TB for ext/rows at home. Patient will continue to benefit from skilled PT services to modify and progress therapeutic interventions, address functional mobility deficits, address ROM deficits, address strength deficits, analyze and address soft tissue restrictions, analyze and cue movement patterns, analyze and modify body mechanics/ergonomics and assess and modify postural abnormalities to attain remaining goals. [x]  See Plan of Care  []  See progress note/recertification  []  See Discharge Summary         Progress towards goals / Updated goals: · Short Term Goals: To be accomplished in  2  treatments:  1. Pt will be compliant with HEP for symptom management at home.   MET. 11/8/19  · Long Term Goals: To be accomplished in  8-12  treatments:  1. Pt will demonstrate an increased FOTO score to 68/100 in order to improve function. 2. Pt will be independent with HEP at D/C for self management. 3. Pt will demonstrate increased right [address] extension to > 40* without pain in order to assist with reaching into her back seat with decreased pain  4. Pt will demonstrate increased bilateral thoracic rotation to >25* in order to assist with mobility and functional reaching  5.  Pt will demonstrate increased right lower trap strength and mid trap strength to > or equal to 3+/5 in order to assist with scapular stabilization with functional overhead activities     PLAN  [x]  Upgrade activities as tolerated     [x]  Continue plan of care  []  Update interventions per flow sheet       []  Discharge due to:_  []  Other:_      Tavo Pritchard, PT 11/8/2019  9:59 AM    Future Appointments   Date Time Provider Clifford Camila   11/8/2019 10:00 AM Juan Barney, PT [facility]   11/13/2019 12:00 PM Juan Barney, PT [facility]   11/15/2019 10:00 AM Juan Barney, PT [facility]   11/20/2019 10:30 AM Juan Barney, PT [facility]   11/22/2019 10:00 AM Juan Barney, PT [facility]   11/25/2019 10:30 AM Juan Barney, PT [facility]   11/27/2019 10:30 AM Juan Barney, PT [facility]   12/2/2019 10:00 AM Juan Barney, PT [facility]   12/4/2019 10:00 AM Juan Barney, PT [id number]   5/22/2020 10:00 AM Oswald Barth MD [address] [place] [address]

## 2019-11-11 ENCOUNTER — TELEPHONE (OUTPATIENT)
Dept: FAMILY MEDICINE CLINIC | Facility: CLINIC | Age: 69
End: 2019-11-11

## 2019-11-11 RX ORDER — CLARITHROMYCIN 250 MG/1
250 TABLET, FILM COATED ORAL 2 TIMES DAILY
Qty: 20 TAB | Refills: 0 | Status: SHIPPED | OUTPATIENT
Start: 2019-11-11 | End: 2019-11-21

## 2019-11-13 ENCOUNTER — HOSPITAL ENCOUNTER (OUTPATIENT)
Dept: PHYSICAL THERAPY | Age: 69
Discharge: HOME | End: 2019-11-13
Payer: MEDICARE

## 2019-11-13 PROCEDURE — 97140 MANUAL THERAPY 1/> REGIONS: CPT

## 2019-11-13 PROCEDURE — 97110 THERAPEUTIC EXERCISES: CPT

## 2019-11-13 NOTE — PROGRESS NOTES
PT DAILY TREATMENT NOTE 10-18    Patient Name: Rosa M Shafer  Date:2019  : 1950  [x]  Patient  Verified  Payor: VA MEDICARE / Plan: VA MEDICARE PART A & B / Product Type: Medicare /    In time:1201  Out time:1251  Total Treatment Time (min): 50  Visit #: 4 of     Medicare/BCBS Only   Total Timed Codes (min):  50 1:1 Treatment Time:  40       Treatment Area: Thoracic spine pain [M54.6]    SUBJECTIVE  Pain Level (0-10 scale): 0/10  Any medication changes, allergies to medications, adverse drug reactions, diagnosis change, or new procedure performed?: [x] No    [] Yes (see summary sheet for update)  Subjective functional status/changes:   [] No changes reported  She is sick with a sinus infection and is on antibiotics.      OBJECTIVE    Modality rationale: decrease pain to improve the patients ability to ease with ADL's   Min Type Additional Details    [] Estim:  []Unatt       []IFC  []Premod                        []Other:  []w/ice   []w/heat  Position:  Location:    [] Estim: []Att    []TENS instruct  []NMES                    []Other:  []w/US   []w/ice   []w/heat  Position:  Location:    []  Traction: [] Cervical       []Lumbar                       [] Prone          []Supine                       []Intermittent   []Continuous Lbs:  [] before manual  [] after manual    []  Ultrasound: []Continuous   [] Pulsed                           []1MHz   []3MHz W/cm2:  Location:    []  Iontophoresis with dexamethasone         Location: [] Take home patch   [] In clinic   10 [x]  Ice     []  heat  []  Ice massage  []  Laser   []  Anodyne Position: supine  Location:    []  Laser with stim  []  Other:  Position:  Location:    []  Vasopneumatic Device Pressure:       [] lo [] med [] hi   Temperature: [] lo [] med [] hi   [] Skin assessment post-treatment:  []intact []redness- no adverse reaction      25 min Therapeutic Exercise:  [] See flow sheet :   Rationale: increase ROM and increase strength to improve the patients ability to ease with ADL's    15 min Manual Therapy:  Passive shoulder stretch with bracing. Rationale: decrease pain, increase ROM and increase tissue extensibility to ease with ADL's            With   [] TE   [] TA   [] neuro   [] other: Patient Education: [x] Review HEP    [] Progressed/Changed HEP based on:   [] positioning   [] body mechanics   [] transfers   [] heat/ice application    [] other:      Other Objective/Functional Measures:  No pain reported during ex's. Pt had medial scapula pain at end of session and a palpable trigger point that was tender. Pain Level (0-10 scale) post treatment: 1/10    ASSESSMENT/Changes in Function: Progressing well. Pt reports she is not sure if the nerve pain will get better. She does have more ROM in her shoulder. Patient will continue to benefit from skilled PT services to modify and progress therapeutic interventions, address functional mobility deficits, address ROM deficits, address strength deficits, analyze and address soft tissue restrictions, analyze and cue movement patterns, analyze and modify body mechanics/ergonomics, assess and modify postural abnormalities and address imbalance/dizziness to attain remaining goals. []  See Plan of Care  []  See progress note/recertification  []  See Discharge Summary         Progress towards goals / Updated goals:  . Pt will be compliant with HEP for symptom management at home.   MET. 11/8/19  · Long Term Goals: To be accomplished in  8-12  treatments:  1. Pt will demonstrate an increased FOTO score to 68/100 in order to improve function. 2. Pt will be independent with HEP at D/C for self management. 3. Pt will demonstrate increased right GH extension to > 40* without pain in order to assist with reaching into her back seat with decreased pain  4. Pt will demonstrate increased bilateral thoracic rotation to >25* in order to assist with mobility and functional reaching  5.  Pt will demonstrate increased right lower trap strength and mid trap strength to > or equal to 3+/5 in order to assist with scapular stabilization with functional overhead activities     PLAN  []  Upgrade activities as tolerated     []  Continue plan of care  []  Update interventions per flow sheet       []  Discharge due to:_  []  Other:_      Logan Chavira, PT 11/13/2019  12:18 PM    Future Appointments   Date Time Provider Clifford Jensen   11/15/2019 10:00 AM Zackery Casanova, PT [facility]   11/20/2019 10:30 AM Zackery Casanova, PT [facility]   11/22/2019 10:00 AM Zackery Casanova, PT [facility]   11/25/2019 10:30 AM Zackery Casanova, PT [facility]   11/27/2019 10:30 AM Zackery Casanova, PT [facility]   12/2/2019 10:00 AM Zackery Casanova, PT [facility]   12/4/2019 10:00 AM Zackery Casanova, PT [facility]   5/22/2020 10:00 AM Rosalia Holland MD [address] [place] [address]

## 2019-11-15 ENCOUNTER — HOSPITAL ENCOUNTER (OUTPATIENT)
Dept: PHYSICAL THERAPY | Age: 69
Discharge: HOME | End: 2019-11-15
Payer: MEDICARE

## 2019-11-15 PROCEDURE — 97140 MANUAL THERAPY 1/> REGIONS: CPT

## 2019-11-15 PROCEDURE — 97110 THERAPEUTIC EXERCISES: CPT

## 2019-11-15 NOTE — PROGRESS NOTES
PT DAILY TREATMENT NOTE 10-18    Patient Name: Jens Blackman  Date:11/15/2019  : 1950  [x]  Patient  Verified  Payor: VA MEDICARE / Plan: VA MEDICARE PART A & B / Product Type: Medicare /    In time:1000  Out time:1044  Total Treatment Time (min): 44  Visit #: 5 of     Medicare/BCBS Only   Total Timed Codes (min):  44 1:1 Treatment Time:  27       Treatment Area: Thoracic spine pain [M54.6]    SUBJECTIVE  Pain Level (0-10 scale): 0/10  Any medication changes, allergies to medications, adverse drug reactions, diagnosis change, or new procedure performed?: [x] No    [] Yes (see summary sheet for update)  Subjective functional status/changes:   [] No changes reported  Reports she has that trigger point that is aggravating along her TS on the right side    OBJECTIVE    Modality rationale: decrease pain to improve the patients ability to ease adl's   Min Type Additional Details    [] Estim:  []Unatt       []IFC  []Premod                        []Other:  []w/ice   []w/heat  Position:  Location:    [] Estim: []Att    []TENS instruct  []NMES                    []Other:  []w/US   []w/ice   []w/heat  Position:  Location:    []  Traction: [] Cervical       []Lumbar                       [] Prone          []Supine                       []Intermittent   []Continuous Lbs:  [] before manual  [] after manual    []  Ultrasound: []Continuous   [] Pulsed                           []1MHz   []3MHz W/cm2:  Location:    []  Iontophoresis with dexamethasone         Location: [] Take home patch   [] In clinic   10 [x]  Ice     []  heat  []  Ice massage  []  Laser   []  Anodyne Position:upine  Ocation:right trunk    []  Laser with stim  []  Other:  Position:  Location:    []  Vasopneumatic Device Pressure:       [] lo [] med [] hi   Temperature: [] lo [] med [] hi   [] Skin assessment post-treatment:  []intact []redness- no adverse reaction    []redness  adverse reaction:       26 min Therapeutic Exercise:  [] See flow sheet :   Rationale: increase ROM and increase strength to improve the patients ability to ease with adl's    8 min Manual Therapy:  PROM to right sh, scap stabs,    Rationale: decrease pain, increase ROM and decrease trigger points to ease with ADl's and reaching. With   [] TE   [] TA   [] neuro   [] other: Patient Education: [x] Review HEP    [] Progressed/Changed HEP based on:   [] positioning   [] body mechanics   [] transfers   [] heat/ice application    [] other:      Other Objective/Functional Measures: Pt continues to improve with ex's. She can reach overhead with greater ease. Pt unable to maintain stable shoulder during stabs in supine due to weakness. Pain Level (0-10 scale) post treatment: 0/10    ASSESSMENT/Changes in Function: Progressing well . Pt is progressing well. She dosen't really feel the sharp trunk pain like she did before. She will continue to benefit from skilled strengthening and ROm and functional activities to further improve QOL . Patient will continue to benefit from skilled PT services to modify and progress therapeutic interventions, address ROM deficits, address strength deficits, analyze and address soft tissue restrictions, analyze and cue movement patterns, analyze and modify body mechanics/ergonomics and assess and modify postural abnormalities to attain remaining goals. []  See Plan of Care  []  See progress note/recertification  []  See Discharge Summary         Progress towards goals / Updated goals:  1. Pt will be compliant with HEP for symptom management at home. ·  MET. 11/8/19  Long Term Goals: To be accomplished in  8-12  treatments:  1. Pt will demonstrate an increased FOTO score to 68/100 in order to improve function. MET. 11/15/19  2. Pt will be independent with HEP at D/C for self management.   3. Pt will demonstrate increased right [address] extension to > 40* without pain in order to assist with reaching into her back seat with decreased pain    4. Pt will demonstrate increased bilateral thoracic rotation to >25* in order to assist with mobility and functional reaching  MET. 11/15/19  5.  Pt will demonstrate increased right lower trap strength and mid trap strength to > or equal to 3+/5 in order to assist with scapular stabilization with functional overhead activities        PLAN  [x]  Upgrade activities as tolerated     [x]  Continue plan of care  []  Update interventions per flow sheet       []  Discharge due to:_  []  Other:_      Lucio Michele, PT 11/15/2019  10:18 AM    Future Appointments   Date Time Provider Clifford Jensen   11/20/2019 10:30 AM Eugenio Mar, PT [facility]   11/22/2019 10:00 AM Eugenio Mar, PT [facility]   11/25/2019 10:30 AM Eugenio Mar, PT [facility]   11/27/2019 10:30 AM Eugenio Mar PT [facility]   12/2/2019 10:00 AM Eugenio Mar, PT [facility]   12/4/2019 10:00 AM Eugenio Mar, PT [facility]   5/22/2020 10:00 AM Enid Amanda MD [address] [place] [address]

## 2019-11-20 ENCOUNTER — HOSPITAL ENCOUNTER (OUTPATIENT)
Dept: PHYSICAL THERAPY | Age: 69
Discharge: HOME | End: 2019-11-20
Payer: MEDICARE

## 2019-11-20 PROCEDURE — 97110 THERAPEUTIC EXERCISES: CPT

## 2019-11-20 PROCEDURE — 97140 MANUAL THERAPY 1/> REGIONS: CPT

## 2019-11-20 NOTE — PROGRESS NOTES
PT DAILY TREATMENT NOTE 10-18    Patient Name: Jazmyne Gutierrez  Date:2019  : 1950 1036[x]  Patient  Verified  Payor: VA MEDICARE / Plan: VA MEDICARE PART A & B / Product Type: Medicare /    In time:1036  Out time:1120  Total Treatment Time (min): 44  Visit #: 1 of 8    Medicare/BCBS Only   Total Timed Codes (min):  44 1:1 Treatment Time:  34       Treatment Area: Thoracic spine pain [M54.6]    SUBJECTIVE  Pain Level (0-10 scale): 0/10  Any medication changes, allergies to medications, adverse drug reactions, diagnosis change, or new procedure performed?: [x] No    [] Yes (see summary sheet for update)  Subjective functional status/changes:   [] No changes reported  Reports she can now put a bigger section of her shower curtain up hefore onset of fatigue. She had about 5 episodes of sharp side trunk pain last couple days. One was when she was trying to reach behind to wipe herself.    OBJECTIVE    Modality rationale: decrease pain to improve the patients ability to to ease wi   Min Type Additional Details    [] Estim:  []Unatt       []IFC  []Premod                        []Other:  []w/ice   []w/heat  Position:  Location:    [] Estim: []Att    []TENS instruct  []NMES                    []Other:  []w/US   []w/ice   []w/heat  Position:  Location:    []  Traction: [] Cervical       []Lumbar                       [] Prone          []Supine                       []Intermittent   []Continuous Lbs:  [] before manual  [] after manual    []  Ultrasound: []Continuous   [] Pulsed                           []1MHz   []3MHz W/cm2:  Location:    []  Iontophoresis with dexamethasone         Location: [] Take home patch   [] In clinic   10 [x]  Ice     []  heat  []  Ice massage  []  Laser   []  Anodyne Position: upine  Location:right shoulder and right trunk, anterior.               []  Laser with stim  []  Other:  Position:  Location:    []  Vasopneumatic Device Pressure:       [] lo [] med [] hi   Temperature: [] lo [] med [] hi   [] Skin assessment post-treatment:  []intact []redness- no adverse reaction      26 min Therapeutic Exercise:  [] See flow sheet :   Rationale: increase ROM and increase strength to improve the patients ability to ease with ADL's    8 min Manual Therapy:  External pertubations to right shoulder. Gentle passive ROm, flexion and scaption. Rationale: decrease pain, increase ROM and decrease trigger points to ease with ADL's and with reaching          With   [] TE   [] TA   [] neuro   [] other: Patient Education: [x] Review HEP    [] Progressed/Changed HEP based on:   [] positioning   [] body mechanics   [] transfers   [] heat/ice application    [] other:      Other Objective/Functional Measures: Continues to be less guarded due to right trunk pain. ROM to left trunk and shoulder is improving. Pain Level (0-10 scale) post treatment: 1/10    ASSESSMENT/Changes in Function: Progressing slowly. Pt had a recurrence of sharp trunk pain recently during self care activities. Patient will continue to benefit from skilled PT services to modify and progress therapeutic interventions, address functional mobility deficits, address ROM deficits, address strength deficits, analyze and address soft tissue restrictions, analyze and cue movement patterns, analyze and modify body mechanics/ergonomics and assess and modify postural abnormalities to attain remaining goals. [x]  See Plan of Care  []  See progress note/recertification  []  See Discharge Summary         Progress towards goals / Updated goals:    1. Pt will be compliant with HEP for symptom management at home. ·  MET. 11/8/19  Long Term Goals: To be accomplished in  8-12  treatments:  1. Pt will demonstrate an increased FOTO score to 68/100 in order to improve function. MET. 11/15/19  2. Pt will be independent with HEP at D/C for self management.   3. Pt will demonstrate increased right [address] extension to > 40* without pain in order to assist with reaching into her back seat with decreased pain     4. Pt will demonstrate increased bilateral thoracic rotation to >25* in order to assist with mobility and functional reaching  MET. 11/15/19  5.  Pt will demonstrate increased right lower trap strength and mid trap strength to > or equal to 3+/5 in order to assist with scapular stabilization with functional overhead activities      PLAN  []  Upgrade activities as tolerated     [x]  Continue plan of care  []  Update interventions per flow sheet       []  Discharge due to:_  []  Other:_      Ekaterina Lopez, PT 11/20/2019  10:57 AM    Future Appointments   Date Time Provider Clifford Jensen   11/22/2019 10:00 AM Edvin Tucker PT [facility]   11/25/2019 10:30 AM Edvin Tucker PT [facility]   11/27/2019 10:30 AM Edvin Tucker PT [facility]   12/2/2019 10:00 AM Edvin Tucker PT [facility]   12/4/2019 10:00 AM Edvin Tucker PT [facility]   5/22/2020 10:00 AM Tracy Muñoz MD [address] [place] [address]

## 2019-11-21 NOTE — PROGRESS NOTES
[facility]  [facility]  [facility]AIRE  [address] [place] [address]  [phone] fax    Continued Plan of Care/ Re-certification for Physical Therapy Services           Patient name: Ofe Vidal Start of Care: 10/17/2019   Referral source: Jorge Ballard, 49Sheyla Yee : 1950               Medical Diagnosis: No admission diagnoses are documented for this encounter. Payor: Cornelio Montalvo / Plan: [place] MEDICARE PART A & B / Product Type: Medicare /  Onset Date:summer 2018               Treatment Diagnosis: thoracic pain   Prior Hospitalization: see medical history Provider#: [id number]   Medications: Verified on Patient summary List    Comorbidities: breast CA with right lumpectomy 2017 and radiation until 2018; OA; osteopenia; RSD left wrist; ORIF left radius; HTN   Prior Level of Function: no right rib pain when reaching behind back or rolling over in bed      Visits from Start of Care: 6    Missed Visits: 0    The Plan of Care and following information is based on the patient's current status:  Goal:Pt will demonstrate an increased FOTO score to 68/100 in order to improve function. Status at last note/certification:FOTO=61  Current Status: met. FOTO=72    Goal:. Pt will demonstrate increased right GH extension to > 40* without pain in order to assist with reaching into her back seat with decreased pain   Status at last note/certification: BOXDWYTAT=13 deg  Current Status: NA due to intermittent pain reaching back. Will measure NV. Goal:Pt will demonstrate increased right lower trap strength and mid trap strength to > or equal to 3+/5 in order to assist with scapular stabilization with functional overhead activities    Status at last note/certification: ZB/ZV=9-/3 and 3-/5  Current Status: met    Key functional changes: Pt reports improve right shoulder function including ROM and strength during overhead reaching and light activity.  She is able to improve shoulder and trunk mobility with less discomfort and more confidence. Pt continues to have intermittent sharp pain to her right anterior ribcage during  Right trunk rotation and IR/extension of right shoulder. Pain levels at that time can increase to 10/10. LT/MT=4-/5   Right shoulder flexion=4-/5   Problems/ barriers to goal attainment: \"crushing Right trunk Pain with LS rotation and shoulder extension and IR\"     Problem List: pain affecting function, decrease ROM, decrease strength, decrease ADL/ functional abilitiies, decrease activity tolerance and decrease flexibility/ joint mobility    Treatment Plan: Therapeutic exercise, Therapeutic activities, Neuromuscular re-education, Physical agent/modality, Manual therapy, Patient education and Self Care training     Patient Goal (s) has been updated and includes: Decrease pain, improve function     Goals for this certification period to be accomplished in 4 weeks:    1.  Pt will demonstrate increased right [address] extension to > 40* without pain in order to assist with reaching into her back seat with decreased pain     2. Pt will demonstrate increased bilateral thoracic rotation to >25* in order to assist with mobility and functional reaching. 3. Pt will report >50% improvement in function to return to tolerated activities with improved QOL. Frequency / Duration: Patient to be seen 2 times per week for 4 weeks:    Assessment / Recommendations:   Patient will continue to benefit from skilled PT services to modify and progress therapeutic interventions, address functional mobility deficits, address ROM deficits, address strength deficits, analyze and address soft tissue restrictions, analyze and cue movement patterns, analyze and modify body mechanics/ergonomics and assess and modify postural abnormalities to attain remaining goals.     Certification Period: 11/15/19 -12/14/19    Sandra Lutz, PT 11/21/2019 2:51 PM    ________________________________________________________________________  I certify that the above Therapy Services are being furnished while the patient is under my care. I agree with the treatment plan and certify that this therapy is necessary. [] I have read the above and request that my patient continue as recommended.   [] I have read the above report and request that my patient continue therapy with the following changes/special instructions: _______________________________________  [] I have read the above report and request that my patient be discharged from therapy    Physician's Signature:____________Date:_________TIME:________    ** Signature, Date and Time must be completed for valid certification **    Please sign and return to [facility]  Sofi Costa  [address] [facility]  [phone] fax

## 2019-11-22 ENCOUNTER — HOSPITAL ENCOUNTER (OUTPATIENT)
Dept: PHYSICAL THERAPY | Age: 69
Discharge: HOME | End: 2019-11-22
Payer: MEDICARE

## 2019-11-22 PROCEDURE — 97110 THERAPEUTIC EXERCISES: CPT

## 2019-11-22 PROCEDURE — 97140 MANUAL THERAPY 1/> REGIONS: CPT

## 2019-11-22 NOTE — PROGRESS NOTES
PT DAILY TREATMENT NOTE 10-18    Patient Name: Enmanuel Gold  Date:2019  : 1950  [x]  Patient  Verified  Payor: VA MEDICARE / Plan: VA MEDICARE PART A & B / Product Type: Medicare /    In time:1122  Out time:1205  Total Treatment Time (min): 43  Visit #: 2 of 8    Medicare/BCBS Only   Total Timed Codes (min):  43 1:1 Treatment Time:  33       Treatment Area: Thoracic spine pain [M54.6]    SUBJECTIVE  Pain Level (0-10 scale): 2/10  Any medication changes, allergies to medications, adverse drug reactions, diagnosis change, or new procedure performed?: [x] No    [] Yes (see summary sheet for update)  Subjective functional status/changes:   [] No changes reported  She dis a lot of moving  Furniture last night and has been having severe bouts of sharp pain.     OBJECTIVE    Modality rationale: decrease pain to improve the patients ability to ease with ADL's   Min Type Additional Details    [] Estim:  []Unatt       []IFC  []Premod                        []Other:  []w/ice   []w/heat  Position:  Location:    [] Estim: []Att    []TENS instruct  []NMES                    []Other:  []w/US   []w/ice   []w/heat  Position:  Location:    []  Traction: [] Cervical       []Lumbar                       [] Prone          []Supine                       []Intermittent   []Continuous Lbs:  [] before manual  [] after manual    []  Ultrasound: []Continuous   [] Pulsed                           []1MHz   []3MHz W/cm2:  Location:    []  Iontophoresis with dexamethasone         Location: [] Take home patch   [] In clinic   10 [x]  Ice     []  heat  []  Ice massage  []  Laser   []  Anodyne Position:supine  Location:right rib Isra Curio    []  Laser with stim  []  Other:  Position:  Location:    []  Vasopneumatic Device Pressure:       [] lo [] med [] hi   Temperature: [] lo [] med [] hi   [] Skin assessment post-treatment:  []intact []redness- no adverse reaction    []redness  adverse reaction:     25  25 min Therapeutic Exercise: [] See flow sheet :   Rationale: increase ROM, increase strength, improve coordination and improve balance to improve the patients ability to ease with ADL's    8 min Manual Therapy:  Gentle fascia stretch to lateral trunk/breast area   Rationale: decrease pain, increase ROM and increase tissue extensibility to ease with sharp pain and to improve ability to perform ADL's. With   [] TE   [] TA   [] neuro   [] other: Patient Education: [x] Review HEP    [] Progressed/Changed HEP based on:   [] positioning   [] body mechanics   [] transfers   [] heat/ice application    [] other:      Other Objective/Functional Measures: palpable firm tissue under right breast line and lateral to it. Tolerated added shoulder extension and IR with cane. Pain Level (0-10 scale) post treatment: 0/10    ASSESSMENT/Changes in Function: Progressing slowly. Pt reports more frequent sharp pain to her ribs during housework activity last night. She has to press into her rib cage and pull down to releve the pressure. Patient will continue to benefit from skilled PT services to modify and progress therapeutic interventions, address functional mobility deficits, address ROM deficits, address strength deficits, analyze and address soft tissue restrictions, analyze and cue movement patterns, analyze and modify body mechanics/ergonomics and assess and modify postural abnormalities to attain remaining goals. [x]  See Plan of Care  []  See progress note/recertification  []  See Discharge Summary         Progress towards goals / Updated goals:     1.  Pt will demonstrate increased right GH extension to > 40* without pain in order to assist with reaching into her back seat with decreased pain     2. Pt will demonstrate increased bilateral thoracic rotation to >25* in order to assist with mobility and functional reaching.     3. Pt will report >50% improvement in function to return to tolerated activities with improved QOL. PLAN  [x]  Upgrade activities as tolerated     [x]  Continue plan of care  []  Update interventions per flow sheet       []  Discharge due to:_  []  Other:_      Martin Knee, PT 11/22/2019  12:04 PM    Future Appointments   Date Time Provider Clifford Jensen   11/25/2019 10:30 AM Rosy Christie, PT [facility]   11/27/2019 10:30 AM Rosy Christie, PT [facility]   12/2/2019 10:00 AM Rosy Christie, PT [facility]   12/4/2019 10:00 AM Rosy Christie, PT [facility]   5/22/2020 10:00 AM Sergio Conteh MD [address] [place] [address]

## 2019-11-25 ENCOUNTER — HOSPITAL ENCOUNTER (OUTPATIENT)
Dept: PHYSICAL THERAPY | Age: 69
Discharge: HOME | End: 2019-11-25
Payer: MEDICARE

## 2019-11-25 PROCEDURE — 97140 MANUAL THERAPY 1/> REGIONS: CPT

## 2019-11-25 PROCEDURE — 97110 THERAPEUTIC EXERCISES: CPT

## 2019-11-25 NOTE — PROGRESS NOTES
PT DAILY TREATMENT NOTE 10-18    Patient Name: Milana Thomas  Date:2019  : 1950  [x]  Patient  Verified  Payor: VA MEDICARE / Plan: VA MEDICARE PART A & B / Product Type: Medicare /    In time:1000  Out time:1045  Total Treatment Time (min): 45  Visit #: 3 of     Medicare/BCBS Only   Total Timed Codes (min):  45 1:1 Treatment Time:  35       Treatment Area: Thoracic spine pain [M54.6]    SUBJECTIVE  Pain Level (0-10 scale): 1-2/10  Any medication changes, allergies to medications, adverse drug reactions, diagnosis change, or new procedure performed?: [x] No    [] Yes (see summary sheet for update)  Subjective functional status/changes:   [] No changes reported  Reports the pain is so much more often recently.      OBJECTIVE    Modality rationale: decrease pain to improve the patients ability to ease with ADL's   Min Type Additional Details    [] Estim:  []Unatt       []IFC  []Premod                        []Other:  []w/ice   []w/heat  Position:  Location:    [] Estim: []Att    []TENS instruct  []NMES                    []Other:  []w/US   []w/ice   []w/heat  Position:  Location:    []  Traction: [] Cervical       []Lumbar                       [] Prone          []Supine                       []Intermittent   []Continuous Lbs:  [] before manual  [] after manual    []  Ultrasound: []Continuous   [] Pulsed                           []1MHz   []3MHz W/cm2:  Location:    []  Iontophoresis with dexamethasone         Location: [] Take home patch   [] In clinic   10 [x]  Ice     []  heat  []  Ice massage  []  Laser   []  Anodyne Position: supine  Location:right trunk /breast    []  Laser with stim  []  Other:  Position:  Location:    []  Vasopneumatic Device Pressure:       [] lo [] med [] hi   Temperature: [] lo [] med [] hi   [] Skin assessment post-treatment:  []intact []redness- no adverse reaction        27 min Therapeutic Exercise:  [] See flow sheet :   Rationale: increase ROM, increase strength and improve balance to improve the patients ability to ease with ADL's    8 min Manual Therapy:  Anterior rib dysfunction correction    Rationale: decrease pain, increase ROM, increase tissue extensibility and decrease trigger points to ease with ADL's          With   [] TE   [] TA   [] neuro   [] other: Patient Education: [x] Review HEP    [] Progressed/Changed HEP based on:   [] positioning   [] body mechanics   [] transfers   [] heat/ice application    [] other:      Other Objective/Functional Measures: Palpated T8 and associated rib posteriorly, in anterior direction. Pt had severe pain with PA rib palpation. MET was performed for anterior rib dysfunction correction. Pt had some relief. Pain Level (0-10 scale) post treatment: 1-2/10    ASSESSMENT/Changes in Function: Pt is guarded due to pain and muscle tightness and weakness. She holds her shoulder in a braced position. Called pt at home for follow up at 3:20. She reported pain  To her LS . She is putting hot packs and taking ibuprofen. Patient will continue to benefit from skilled PT services to modify and progress therapeutic interventions, address functional mobility deficits, address ROM deficits, address strength deficits, analyze and address soft tissue restrictions, analyze and cue movement patterns, analyze and modify body mechanics/ergonomics and assess and modify postural abnormalities to attain remaining goals. [x]  See Plan of Care  []  See progress note/recertification  []  See Discharge Summary         Progress towards goals / Updated goals:  1.  Pt will demonstrate increased right GH extension to > 40* without pain in order to assist with reaching into her back seat with decreased pain     2. Pt will demonstrate increased bilateral thoracic rotation to >25* in order to assist with mobility and functional reaching.     3. Pt will report >50% improvement in function to return to tolerated activities with improved QOL.      PLAN  [x] Upgrade activities as tolerated     [x]  Continue plan of care  []  Update interventions per flow sheet       []  Discharge due to:_  []  Other:_      Nicole Michaud, PT 11/25/2019  10:36 AM    Future Appointments   Date Time Provider Clifford Jensen   11/27/2019 10:30 AM Olaf Funes, PT [facility]   12/2/2019 10:00 AM Olaf Funes, PT [facility]   12/4/2019 10:00 AM Olaf Funes, PT [id number]   5/22/2020 10:00 AM Guera Dickinson MD [address] [place] [address]

## 2019-11-27 ENCOUNTER — HOSPITAL ENCOUNTER (OUTPATIENT)
Dept: PHYSICAL THERAPY | Age: 69
Discharge: HOME | End: 2019-11-27
Payer: MEDICARE

## 2019-11-27 PROCEDURE — 97110 THERAPEUTIC EXERCISES: CPT

## 2019-11-27 PROCEDURE — 97140 MANUAL THERAPY 1/> REGIONS: CPT

## 2019-11-27 NOTE — PROGRESS NOTES
PT DAILY TREATMENT NOTE 10-18    Patient Name: Tessie Enriquez  Date:2019  : 1950  [x]  Patient  Verified  Payor: VA MEDICARE / Plan: VA MEDICARE PART A & B / Product Type: Medicare /    In time:1030  Out time:1107  Total Treatment Time (min): 37  Visit #: 4 of     Medicare/BCBS Only   Total Timed Codes (min):  37 1:1 Treatment Time:  27       Treatment Area: Thoracic spine pain [M54.6]    SUBJECTIVE  Pain Level (0-10 scale): 2/10  Any medication changes, allergies to medications, adverse drug reactions, diagnosis change, or new procedure performed?: [x] No    [] Yes (see summary sheet for update)  Subjective functional status/changes:   [] No changes reported  Reports she got felt cloth to go over the strap aroungdher torso/Ribs to brace her rib.     OBJECTIVE    Modality rationale: decrease pain to improve the patients ability to ease with ADL's   Min Type Additional Details    [] Estim:  []Unatt       []IFC  []Premod                        []Other:  []w/ice   []w/heat  Position:  Location:    [] Estim: []Att    []TENS instruct  []NMES                    []Other:  []w/US   []w/ice   []w/heat  Position:  Location:    []  Traction: [] Cervical       []Lumbar                       [] Prone          []Supine                       []Intermittent   []Continuous Lbs:  [] before manual  [] after manual    []  Ultrasound: []Continuous   [] Pulsed                           []1MHz   []3MHz W/cm2:  Location:    []  Iontophoresis with dexamethasone         Location: [] Take home patch   [] In clinic   10 [x]  Ice     []  heat  []  Ice massage  []  Laser   []  Anodyne Position:supine  Location:right trunk/brest    []  Laser with stim  []  Other:  Position:  Location:    []  Vasopneumatic Device Pressure:       [] lo [] med [] hi   Temperature: [] lo [] med [] hi   [] Skin assessment post-treatment:  []intact []redness- no adverse reaction    []redness  adverse reaction:     19 min Therapeutic Exercise:  [] See flow sheet :   Rationale: increase ROM and increase strength to improve the patients ability to ease with ADL's    8 min Manual Therapy:  MFR to right side rib cage , MET for ant rib correction. Rationale: decrease pain, increase ROM and increase tissue extensibility to ease with ADL's      With   [] TE   [] TA   [] neuro   [] other: Patient Education: [x] Review HEP    [] Progressed/Changed HEP based on:   [] positioning   [] body mechanics   [] transfers   [] heat/ice application    [] other:      Other Objective/Functional Measures: Good tolerance to thrunk rotation during open book  Pt guarded from yesterday's occurrence of pain w/o the bracing. Increase right TS paraspinal TTP  Pain Level (0-10 scale) post treatment: 1/10    ASSESSMENT/Changes in Function: Slow steady progress. Patient will continue to benefit from skilled PT services to modify and progress therapeutic interventions, address functional mobility deficits, address ROM deficits, address strength deficits, analyze and address soft tissue restrictions, analyze and cue movement patterns and analyze and modify body mechanics/ergonomics to attain remaining goals. [x]  See Plan of Care  []  See progress note/recertification  []  See Discharge Summary         Progress towards goals / Updated goals:  1.  Pt will demonstrate increased right GH extension to > 40* without pain in order to assist with reaching into her back seat with decreased pain     2. Pt will demonstrate increased bilateral thoracic rotation to >25* in order to assist with mobility and functional reaching.     3. Pt will report >50% improvement in function to return to tolerated activities with improved QOL.      PLAN  []  Upgrade activities as tolerated     [x]  Continue plan of care  []  Update interventions per flow sheet       []  Discharge due to:_  []  Other:_      Sandra Lutz, PT 11/27/2019  3:03 PM    Future Appointments   Date Time Provider Clifford Jensen 12/2/2019 10:00 AM Carlos Silverman PT [id number]   12/4/2019 10:00 AM Carlos Silverman PT [id number]   5/22/2020 10:00 AM Sarath Storey MD [address] [place] [address]

## 2019-12-02 ENCOUNTER — HOSPITAL ENCOUNTER (OUTPATIENT)
Dept: PHYSICAL THERAPY | Age: 69
Discharge: HOME | End: 2019-12-02
Payer: MEDICARE

## 2019-12-02 PROCEDURE — 97110 THERAPEUTIC EXERCISES: CPT

## 2019-12-02 PROCEDURE — 97140 MANUAL THERAPY 1/> REGIONS: CPT

## 2019-12-02 NOTE — PROGRESS NOTES
PT DAILY TREATMENT NOTE 10-18    Patient Name: Francheska Ramirez  Date:2019  : 1950  [x]  Patient  Verified  Payor: VA MEDICARE / Plan: VA MEDICARE PART A & B / Product Type: Medicare /    In time:1000  Out time:1034  Total Treatment Time (min): 34  Visit #: 4 of     Medicare/BCBS Only   Total Timed Codes (min):  34 1:1 Treatment Time:  34       Treatment Area: Thoracic spine pain [M54.6]    SUBJECTIVE  Pain Level (0-10 scale): 0/10  Any medication changes, allergies to medications, adverse drug reactions, diagnosis change, or new procedure performed?: [x] No    [] Yes (see summary sheet for update)  Subjective functional status/changes:   [] No changes reported  Reports she is not getting any better the pain was so constant this weekend.  I t was sharp ans stabbing and recyrring    OBJECTIVE    Modality rationale:  to improve the patients ability to    Min Type Additional Details    [] Estim:  []Unatt       []IFC  []Premod                        []Other:  []w/ice   []w/heat  Position:  Location:    [] Estim: []Att    []TENS instruct  []NMES                    []Other:  []w/US   []w/ice   []w/heat  Position:  Location:    []  Traction: [] Cervical       []Lumbar                       [] Prone          []Supine                       []Intermittent   []Continuous Lbs:  [] before manual  [] after manual    []  Ultrasound: []Continuous   [] Pulsed                           []1MHz   []3MHz W/cm2:  Location:    []  Iontophoresis with dexamethasone         Location: [] Take home patch   [] In clinic    []  Ice     []  heat  []  Ice massage  []  Laser   []  Anodyne Position:  Location:    []  Laser with stim  []  Other:  Position:  Location:    []  Vasopneumatic Device Pressure:       [] lo [] med [] hi   Temperature: [] lo [] med [] hi   [] Skin assessment post-treatment:  []intact []redness- no adverse reaction      26 min Therapeutic Exercise:  [] See flow sheet :   Rationale: increase ROM and increase strength to improve the patients ability to ease with ADL's    8 min Manual Therapy:  Manual assisted rotation seated, MFR at right rib cage at 4-6th rib. Rationale: decrease pain, increase ROM and increase tissue extensibility to ease with ADL's       With   [] TE   [] TA   [] neuro   [] other: Patient Education: [x] Review HEP    [] Progressed/Changed HEP based on:   [] positioning   [] body mechanics   [] transfers   [] heat/ice application    [] other:      Other Objective/Functional Measures: No pain reported during today's session. Attempted quad assisted with arms on stool with chest drop down to open up rib cage during inhalation and exhalation. Worked on thoracic rotation with gentle OP. Pain Level (0-10 scale) post treatment: 0/10    ASSESSMENT/Changes in Function: Slow progression . Pt will follow up with MD, ortho doc, to determine a diagnosis of sx/source  of thoracic pain    Patient will continue to benefit from skilled PT services to modify and progress therapeutic interventions, address functional mobility deficits, address ROM deficits, address strength deficits, analyze and address soft tissue restrictions, analyze and cue movement patterns, analyze and modify body mechanics/ergonomics and assess and modify postural abnormalities to attain remaining goals. [x]  See Plan of Care  []  See progress note/recertification  []  See Discharge Summary         Progress towards goals / Updated goals:  1.  Pt will demonstrate increased right GH extension to > 40* without pain in order to assist with reaching into her back seat with decreased pain     2. Pt will demonstrate increased bilateral thoracic rotation to >25* in order to assist with mobility and functional reaching.     3. Pt will report >50% improvement in function to return to tolerated activities with improved QOL.      PLAN  [x]  Upgrade activities as tolerated     [x]  Continue plan of care  []  Update interventions per flow sheet []  Discharge due to:_  []  Other:_      Karmen Matta, PT 12/2/2019  2:43 PM    Future Appointments   Date Time Provider Clifford Jensen   12/4/2019 10:00 AM Tommie Paul, PT [id number]   5/22/2020 10:00 AM Katelin Cho MD [address] [place] [address]

## 2019-12-03 ENCOUNTER — OFFICE VISIT (OUTPATIENT)
Dept: ORTHOPEDIC SURGERY | Age: 69
End: 2019-12-03

## 2019-12-03 VITALS
WEIGHT: 178 LBS | TEMPERATURE: 99.2 F | SYSTOLIC BLOOD PRESSURE: 130 MMHG | DIASTOLIC BLOOD PRESSURE: 88 MMHG | BODY MASS INDEX: 33.61 KG/M2 | HEART RATE: 60 BPM | RESPIRATION RATE: 15 BRPM | HEIGHT: 61 IN

## 2019-12-03 DIAGNOSIS — M99.08 RIB CAGE REGION SOMATIC DYSFUNCTION: ICD-10-CM

## 2019-12-03 DIAGNOSIS — M94.0 COSTOCHONDRITIS: Primary | ICD-10-CM

## 2019-12-03 DIAGNOSIS — M99.02 THORACIC REGION SOMATIC DYSFUNCTION: ICD-10-CM

## 2019-12-03 RX ORDER — DICLOFENAC SODIUM 10 MG/G
2 GEL TOPICAL
Qty: 200 G | Refills: 1 | Status: SHIPPED | OUTPATIENT
Start: 2019-12-03 | End: 2022-05-06

## 2019-12-03 RX ORDER — FAMOTIDINE 20 MG/1
20 TABLET, FILM COATED ORAL
COMMUNITY

## 2019-12-03 NOTE — PROGRESS NOTES
Pt reports pain onset 1.5 years. Pt reports ОЛЬГА bending over and had acute onset of pain. Has been using a rib belt from PT. Has had extensive testing without dx. Started PT about 4 weeks ago and that seemed to have increased pain a little bit.

## 2019-12-03 NOTE — PROGRESS NOTES
HISTORY OF PRESENT ILLNESS    Darryl Mario is a 77y.o. year old female comes in today as new patient for: right rib pain    Patients symptoms have been present for 1.5 years. Pain level 1/10 anteriro right ribs at area prior radiation/surgery for breast CA followed by Dr. Benjamin Guy. Had severe pain that spot 2 months after surgery/rad and worsening with certain positions. Very bad a few weeks later reaching back behind her to right. Concern about Fx so CXR done then and no Fx so was Tx costochondritis by Dr. Ignacia Stewart w/o benefit. Had bone scan which showed abnormality T-spne but nothing anterior ribs. Patient has tried:  PT with benefit. Considering compression belt from PT. PT thinking rib anterior. IMAGING: CR chest 9/21/19  IMPRESSION:  1. Subcentimeter irregular nodules at the left apex, could be spectrum of left  apical pleural parenchymal scarring but neoplastic process not excluded. -Recommend 3 month follow-up CT scan. 2. Evidence of some degenerative change at the cervicothoracic junction and  upper thoracic spine which could correspond with the uptake seen on prior bone  scan. Follow-up with bone scan may be helpful for further evaluation. 3. Opacity at the medial right breast, likely corresponds with post lobectomy  changes. This can be better followed with mammogram.  4. Small to moderate hiatal hernia. 5. Hepatic steatosis. 6. Hypodense lesions in the liver have been stable since 2016, therefore  considered benign.   Past Surgical History:   Procedure Laterality Date    HX CARPAL TUNNEL RELEASE      HX GYN      lap. fibroid removal    HX HEART CATHETERIZATION  2005 approx    normal coronaries per pt    HX MASTECTOMY Right 11/30/2017    RIGHT BREAST LUMPECTOMY WITH NEEDLE LOCALIZATION Carol Edward LYMPH NODE BIOPSY  performed by Camila Mojica MD at [address] [place] [address] HX ORTHOPAEDIC      ORIF left wrist    HX SHOULDER ARTHROSCOPY Left 1988     Social History     Socioeconomic History    Marital status:      Spouse name: Not on file    Number of children: Not on file    Years of education: Not on file    Highest education level: Not on file   Tobacco Use    Smoking status: Former Smoker     Packs/day: 0.50     Years: 11.00     Pack years: 5.50     Last attempt to quit: 2003     Years since quittin.9    Smokeless tobacco: Never Used   Substance and Sexual Activity    Alcohol use: Yes     Alcohol/week: 1.0 standard drinks     Types: 1 Glasses of wine per week     Frequency: Monthly or less     Drinks per session: 1 or 2     Comment: rarely- 3-4 drinks/yr    Drug use: No    Sexual activity: Not Currently      Current Outpatient Medications   Medication Sig Dispense Refill    famotidine (PEPCID) 20 mg tablet Take 20 mg by mouth two (2) times a day.  denosumab (PROLIA) 60 mg/mL injection 60 mg by SubCUTAneous route.  losartan (COZAAR) 100 mg tablet TAKE 1 TABLET BY MOUTH EVERY DAY 90 Tab 3    rosuvastatin (CRESTOR) 10 mg tablet Take 1 Tab by mouth nightly. 90 Tab 1    losartan (COZAAR) 100 mg tablet TAKE 1 TABLET BY MOUTH DAILY 90 Tab 0    labetalol (NORMODYNE) 200 mg tablet TAKE ONE TABLET BY MOUTH 2 TIMES A  Tab 2    acetaminophen (TYLENOL) 325 mg tablet Take  by mouth every four (4) hours as needed for Pain.  cholecalciferol, VITAMIN D3, (VITAMIN D3) 5,000 unit tab tablet Take  by mouth daily.  letrozole (FEMARA) 2.5 mg tablet Take 2.5 mg by mouth daily.  furosemide (LASIX) 20 mg tablet TAKE 1 TABLET BY MOUTH DAILY AS NEEDED 30 Tab 0    Dexlansoprazole (DEXILANT) 60 mg CpDB Take  by mouth Daily (before breakfast).  cycloSPORINE (RESTASIS) 0.05 % ophthalmic emulsion Administer 1 Drop to both eyes two (2) times a day.        Past Medical History:   Diagnosis Date    Acute reaction to stress     Breast cancer ([facility] [address]     Closed fracture of left wrist     Diastolic dysfunction     Fatty liver     GERD (gastroesophageal reflux disease)     Hyperlipidemia     Hypertension     diastolic dysfunction    Hypoglycemia     Kidney stones     11/16 for yrs; spon passage always so far    Radiation therapy complication     RSD (reflex sympathetic dystrophy) 8/2009    no blood pressure or sticks in left arm    Syncope 2000    no recurrence since; was orthostatic at that time    Unspecified adverse effect of anesthesia     hard to sedate     Family History   Problem Relation Age of Onset    Diabetes Maternal Aunt     Cancer Maternal Uncle         throat    Cancer Maternal Grandmother         melanoma    Heart Disease Other     Heart Attack Paternal Grandmother 48    Hypertension Mother     Cancer Maternal Grandfather         Melanoma    Stroke Neg Hx          ROS:  No numb, swell, night pain. All other systems reviewed and negative aside from that written in the HPI. Objective:  /88   Pulse 60   Temp 99.2 °F (37.3 °C)   Resp 15   Ht 5' 1\" (1.549 m)   Wt 178 lb (80.7 kg)   BMI 33.63 kg/m²   GEN:  Appears stated age in NAD. HEAD:  Normocephalic, Atraumatic. NEURO:  Sensation intact light touch upper and lower extremities. Biceps & Triceps reflexes +2/4 bilaterally. Patellar and Achilles +2/4  M/S:  Examined seating and supine. Spurling negative bilateral .  Cervical rotation Normal bilateral  TTA at T3, 6, 7 on right, worse with flexion. Rib(s) 6, 7anterior and TTP on right. Strength +5/5 Bilateral upper and lower extremities. EXT  no clubbing/cyanosis. no edema. SKIN: Warm & dry w/o rash. HEENT: Conjunctiva/lids WNL. External canals/nares WNL. Tongue midline. PERRL, EOMI. Hearing intact. NECK: Trachea midline. Supple, Full ROM. No thyromegaly. CARDIAC: No edema. LUNGS: Normal effort. ABD: Soft, no masses. No HSM. PSYCH: A+O x3. Appropriate judgment and insight. Assessment/Plan:     ICD-10-CM ICD-9-CM    1. Costochondritis M94.0 733.6 diclofenac (VOLTAREN) 1 % gel   2.  Rib cage region somatic dysfunction M99.08 739.8 CT OSTEOPATHIC MANIP,1-2 BODY REGN   3. Thoracic region somatic dysfunction M99.02 739.2 CT OSTEOPATHIC MANIP,1-2 BODY REGN       Patient (or guardian if minor) verbalizes understanding of evaluation and plan. Verbal consent obtained. Thoracic and Rib SD treated with ME. Correction of previous malalignments verified after Tx. Pt tolerated well. Notes improvement of Sx and pain is now rated 0-1/10. HEP/stretches daily. Discussed continued strap and will try voltaren gel and RTC 3 weeks.

## 2019-12-03 NOTE — PATIENT INSTRUCTIONS
Continue to use strap, use medication as prescribed, stretch daily, and return to the office in about 3 weeks. Search YouTube for my channel: 
 
Dr. Arjun Ibanez Neck/Upper back

## 2019-12-04 ENCOUNTER — HOSPITAL ENCOUNTER (OUTPATIENT)
Dept: PHYSICAL THERAPY | Age: 69
Discharge: HOME | End: 2019-12-04
Payer: MEDICARE

## 2019-12-04 PROCEDURE — 97110 THERAPEUTIC EXERCISES: CPT

## 2019-12-04 NOTE — PROGRESS NOTES
PT DAILY TREATMENT NOTE 10-18    Patient Name: Mya Galo  Date:2019  : 1950  [x]  Patient  Verified  Payor: [place] MEDICARE / Plan: [place] MEDICARE PART A & B / Product Type: Medicare /    In time:1000  Out time:1045  Total Treatment Time (min): 45  Visit #: 6 of     Medicare/BCBS Only   Total Timed Codes (min):  45 1:1 Treatment Time:  35       Treatment Area: Thoracic spine pain [M54.6]    SUBJECTIVE  Pain Level (0-10 scale): 0/10  Any medication changes, allergies to medications, adverse drug reactions, diagnosis change, or new procedure performed?: [x] No    [] Yes (see summary sheet for update)  Subjective functional status/changes:   [] No changes reported  Reports she saw Dr. Lianne Lopez   And he diagnosed Costochondritis, rib cage region ,somatic dysfunction MD     OBJECTIVE    Modality rationale: decrease pain to improve the patients ability to ease with ADL's   Min Type Additional Details    [] Estim:  []Unatt       []IFC  []Premod                        []Other:  []w/ice   []w/heat  Position:  Location:    [] Estim: []Att    []TENS instruct  []NMES                    []Other:  []w/US   []w/ice   []w/heat  Position:  Location:    []  Traction: [] Cervical       []Lumbar                       [] Prone          []Supine                       []Intermittent   []Continuous Lbs:  [] before manual  [] after manual    []  Ultrasound: []Continuous   [] Pulsed                           []1MHz   []3MHz W/cm2:  Location:    []  Iontophoresis with dexamethasone         Location: [] Take home patch   [] In clinic   10 [x]  Ice     []  heat  []  Ice massage  []  Laser   []  Anodyne Position:supine  Location:right rib cage    []  Laser with stim  []  Other:  Position:  Location:    []  Vasopneumatic Device Pressure:       [] lo [] med [] hi   Temperature: [] lo [] med [] hi   [] Skin assessment post-treatment:  []intact []redness- no adverse reaction    []redness  adverse reaction:       35 min Therapeutic Exercise:  [] See flow sheet :   Rationale: increase ROM, increase strength and improve coordination to improve the patients ability to ease with ADL's          With   [] TE   [] TA   [] neuro   [] other: Patient Education: [x] Review HEP    [] Progressed/Changed HEP based on:   [] positioning   [] body mechanics   [] transfers   [] heat/ice application    [] other:      Other Objective/Functional Measures: Added rotational component with SB reach w/o onset of pain. Still has tender area on scapula but not as intense. Reports sx feels 'different\"  Pain Level (0-10 scale) post treatment: 0/10    ASSESSMENT/Changes in Function: Pt reported MD did MET and is very happy with outcome. She has not had any significant pain since. Pt issued voltaren gel by MD. Pt will continue with PT with improved outcomes since onset. Patient will continue to benefit from skilled PT services to modify and progress therapeutic interventions, address functional mobility deficits, address ROM deficits, address strength deficits, analyze and address soft tissue restrictions, analyze and cue movement patterns and analyze and modify body mechanics/ergonomics to attain remaining goals. []  See Plan of Care  []  See progress note/recertification  []  See Discharge Summary         Progress towards goals / Updated goals:  1.  Pt will demonstrate increased right GH extension to > 40* without pain in order to assist with reaching into her back seat with decreased pain     2. Pt will demonstrate increased bilateral thoracic rotation to >25* in order to assist with mobility and functional reaching.     3. Pt will report >50% improvement in function to return to tolerated activities with improved QOL.      PLAN  []  Upgrade activities as tolerated     []  Continue plan of care  []  Update interventions per flow sheet       []  Discharge due to:_  []  Other:_      Keyla Cobb, PT 12/4/2019  2:26 PM    Future Appointments   Date Time Provider Clifford Jensen   12/31/2019 10:20 AM Joseluis, [facility]   5/22/2020 10:00 AM Guera Dickinson  [facility]

## 2019-12-31 ENCOUNTER — OFFICE VISIT (OUTPATIENT)
Dept: ORTHOPEDIC SURGERY | Age: 69
End: 2019-12-31

## 2019-12-31 VITALS
WEIGHT: 184 LBS | BODY MASS INDEX: 34.74 KG/M2 | RESPIRATION RATE: 15 BRPM | SYSTOLIC BLOOD PRESSURE: 138 MMHG | HEART RATE: 80 BPM | DIASTOLIC BLOOD PRESSURE: 83 MMHG | TEMPERATURE: 97.4 F | HEIGHT: 61 IN

## 2019-12-31 DIAGNOSIS — M99.02 THORACIC REGION SOMATIC DYSFUNCTION: ICD-10-CM

## 2019-12-31 DIAGNOSIS — M94.0 COSTOCHONDRITIS: Primary | ICD-10-CM

## 2019-12-31 DIAGNOSIS — M99.08 RIB CAGE REGION SOMATIC DYSFUNCTION: ICD-10-CM

## 2019-12-31 NOTE — PATIENT INSTRUCTIONS
Continue treatment plan of belt/brace use, physical therapy and home exercises and return to the office as needed.     Search YouTube for my channel:    Dr. Starr Love    Neck/Upper back

## 2019-12-31 NOTE — PROGRESS NOTES
HISTORY OF PRESENT ILLNESS    Genoveva Estrella is a 77y.o. year old female comes in today to be evaluated and treated for: right rib pain    Since last appt has noticed great improvement after manip and still using belt/brace for relief. Pain level 10 - Worst pain ever/10 at worst but rarely that bad now. Much better able to breath now compared to prior to manip. IMAGING: CT chest 19  IMPRESSION:  1. Subcentimeter irregular nodules at the left apex, could be spectrum of left  apical pleural parenchymal scarring but neoplastic process not excluded. -Recommend 3 month follow-up CT scan. 2. Evidence of some degenerative change at the cervicothoracic junction and  upper thoracic spine which could correspond with the uptake seen on prior bone  scan. Follow-up with bone scan may be helpful for further evaluation. 3. Opacity at the medial right breast, likely corresponds with post lobectomy  changes. This can be better followed with mammogram.  4. Small to moderate hiatal hernia. 5. Hepatic steatosis. 6. Hypodense lesions in the liver have been stable since 2016, therefore  considered benign.     Past Surgical History:   Procedure Laterality Date    HX CARPAL TUNNEL RELEASE      HX GYN      lap. fibroid removal    HX HEART CATHETERIZATION   approx    normal coronaries per pt    HX MASTECTOMY Right 2017    RIGHT BREAST LUMPECTOMY WITH NEEDLE LOCALIZATION Landy Buddle LYMPH NODE BIOPSY  performed by Benjamin Hernandes MD at [address] [place] [address] HX ORTHOPAEDIC      ORIF left wrist    HX SHOULDER ARTHROSCOPY Left      Social History     Socioeconomic History    Marital status:      Spouse name: Not on file    Number of children: Not on file    Years of education: Not on file    Highest education level: Not on file   Tobacco Use    Smoking status: Former Smoker     Packs/day: 0.50     Years: 11.00     Pack years: 5.50     Last attempt to quit: 2003     Years since quittin.0    Smokeless tobacco: Never Used   Substance and Sexual Activity    Alcohol use: Yes     Alcohol/week: 1.0 standard drinks     Types: 1 Glasses of wine per week     Frequency: Monthly or less     Drinks per session: 1 or 2     Comment: rarely- 3-4 drinks/yr    Drug use: No    Sexual activity: Not Currently     Current Outpatient Medications   Medication Sig Dispense Refill    famotidine (PEPCID) 20 mg tablet Take 20 mg by mouth two (2) times a day.  denosumab (PROLIA) 60 mg/mL injection 60 mg by SubCUTAneous route.  diclofenac (VOLTAREN) 1 % gel Apply 2 g to affected area every six (6) hours as needed for Pain (right mid-anterior ribs). 200 g 1    losartan (COZAAR) 100 mg tablet TAKE 1 TABLET BY MOUTH EVERY DAY 90 Tab 3    rosuvastatin (CRESTOR) 10 mg tablet Take 1 Tab by mouth nightly. 90 Tab 1    losartan (COZAAR) 100 mg tablet TAKE 1 TABLET BY MOUTH DAILY 90 Tab 0    labetalol (NORMODYNE) 200 mg tablet TAKE ONE TABLET BY MOUTH 2 TIMES A  Tab 2    acetaminophen (TYLENOL) 325 mg tablet Take  by mouth every four (4) hours as needed for Pain.  furosemide (LASIX) 20 mg tablet TAKE 1 TABLET BY MOUTH DAILY AS NEEDED 30 Tab 0    cholecalciferol, VITAMIN D3, (VITAMIN D3) 5,000 unit tab tablet Take  by mouth daily.  letrozole (FEMARA) 2.5 mg tablet Take 2.5 mg by mouth daily.  Dexlansoprazole (DEXILANT) 60 mg CpDB Take  by mouth Daily (before breakfast).  cycloSPORINE (RESTASIS) 0.05 % ophthalmic emulsion Administer 1 Drop to both eyes two (2) times a day.        Past Medical History:   Diagnosis Date    Acute reaction to stress     Breast cancer ([facility] [address]     right breast cancer    Closed fracture of left wrist     Diastolic dysfunction     Fatty liver     GERD (gastroesophageal reflux disease)     Hyperlipidemia     Hypertension     diastolic dysfunction    Hypoglycemia     Kidney stones     11/16 for yrs; spon passage always so far    Radiation therapy complication     RSD (reflex sympathetic dystrophy) 8/2009    no blood pressure or sticks in left arm    Syncope 2000    no recurrence since; was orthostatic at that time    Unspecified adverse effect of anesthesia     hard to sedate     Family History   Problem Relation Age of Onset    Diabetes Maternal Aunt     Cancer Maternal Uncle         throat    Cancer Maternal Grandmother         melanoma    Heart Disease Other     Heart Attack Paternal Grandmother 48    Hypertension Mother     Cancer Maternal Grandfather         Melanoma    Stroke Neg Hx        ROS:  No numb, swell, night pain. Objective:  /83   Pulse 80   Temp 97.4 °F (36.3 °C)   Resp 15   Ht 5' 1\" (1.549 m)   Wt 184 lb (83.5 kg)   BMI 34.77 kg/m²   GEN:  Appears stated age in NAD. HEAD:  Normocephalic, Atraumatic. NEURO:  Sensation intact light touch upper and lower extremities. Biceps & Triceps reflexes +2/4 bilaterally. Patellar and Achilles +2/4  M/S:  Examined seating and supine. Spurling negative bilateral .  Cervical rotation Normal bilateral  TTA at T6, 7 on right, worse with flexion. Rib(s) 6, 7 anterior and TTP on right. Strength +5/5 Bilateral upper and lower extremities. EXT  no clubbing/cyanosis. no edema. SKIN: Warm & dry w/o rash. Assessment/Plan:     ICD-10-CM ICD-9-CM    1. Costochondritis M94.0 733.6    2. Thoracic region somatic dysfunction M99.02 739.2 TN OSTEOPATHIC MANIP,1-2 BODY REGN   3. Rib cage region somatic dysfunction M99.08 739.8 TN OSTEOPATHIC MANIP,1-2 BODY REGN     Time with Pt 29 minutes, >50% of which was counseling pt regarding Dx and Tx options and coordination of care. Patient (or guardian if minor) verbalizes understanding of evaluation and plan. Continue belt and HEP/PT and f/u Dr. Prasanna Vann for breast CA maint.

## 2020-01-07 DIAGNOSIS — I10 ESSENTIAL HYPERTENSION: ICD-10-CM

## 2020-01-07 RX ORDER — LABETALOL 200 MG/1
TABLET, FILM COATED ORAL
Qty: 180 TAB | Refills: 2 | Status: SHIPPED | OUTPATIENT
Start: 2020-01-07 | End: 2021-03-09

## 2020-01-10 NOTE — ANCILLARY DISCHARGE INSTRUCTIONS
[facility] Michelle Nassar  [address] [facility]  [phone] fax    Physical Therapy Discharge Summary      Patient name: She Amezquita Start of Care: 10/17/2019   Referral source: Neelima Pedraza [place] : 1950               Medical Diagnosis: No admission diagnoses are documented for this encounter. Payor: Patricia Lucas / Plan: [place] MEDICARE PART A & B / Product Type: Medicare /  Onset Date:summer 2948               Treatment Diagnosis: thoracic pain   Prior Hospitalization: see medical history Provider#: [id number]   Medications: Verified on Patient summary List    Comorbidities: breast CA with right lumpectomy 2017 and radiation until 2018; OA; osteopenia; RSD left wrist; ORIF left radius; HTN   Prior Level of Function: no right rib pain when reaching behind back or rolling over in bed     Visits from Start of Care: 6    Missed Visits: 0    Reporting Period : 2019 to 19    Summary of Care:  Goal: Pt will report >50% improvement in function to return to tolerated activities with improved QOL.   Status at last note/certification:   Status at discharge: met    Goal:2. Pt will demonstrate increased bilateral thoracic rotation to >25* in order to assist with mobility and functional reaching.   Status at last note/certification:  Status at discharge: NA    Goal:. Pt will report >50% improvement in function to return to tolerated activities with improved QOL.   Status at last note/certification: see eval  Status at discharge: met     Pt has followed up with MD, [facility], , and diagnosed with Costochondritis, rib dysfunction on the right side. She did not. Pt has had some sx improvement in therapy. She was given a rib cage brace and ex's/ stretches for self correction. Educated on techniques and self care. Pt is very pleased with outcomes. Pt DC'd to HEP and will follow up as needed.        ASSESSMENT/RECOMMENDATIONS:  [x]Discontinue therapy: [x]Patient has reached or is progressing toward set goals      []Patient is non-compliant or has abdicated      []Due to lack of appreciable progress towards set goals    Jackeline Brewer, PT 1/10/2020 3:19 PM

## 2020-03-16 ENCOUNTER — HOSPITAL ENCOUNTER (OUTPATIENT)
Dept: BONE DENSITY | Age: 70
Discharge: HOME | End: 2020-03-16
Attending: PHYSICIAN ASSISTANT
Payer: MEDICARE

## 2020-03-16 DIAGNOSIS — M81.0 SENILE OSTEOPOROSIS: ICD-10-CM

## 2020-03-16 PROCEDURE — 77080 DXA BONE DENSITY AXIAL: CPT

## 2020-05-04 ENCOUNTER — HOSPITAL ENCOUNTER (OUTPATIENT)
Dept: MAMMOGRAPHY | Age: 70
Discharge: HOME | End: 2020-05-04
Payer: MEDICARE

## 2020-05-04 DIAGNOSIS — Z85.3 PERSONAL HISTORY OF BREAST CANCER: ICD-10-CM

## 2020-05-04 PROCEDURE — 77062 BREAST TOMOSYNTHESIS BI: CPT

## 2020-07-27 ENCOUNTER — OFFICE VISIT (OUTPATIENT)
Dept: CARDIOLOGY CLINIC | Age: 70
End: 2020-07-27

## 2020-07-27 VITALS
HEART RATE: 74 BPM | DIASTOLIC BLOOD PRESSURE: 67 MMHG | WEIGHT: 182.6 LBS | SYSTOLIC BLOOD PRESSURE: 114 MMHG | TEMPERATURE: 97.7 F | BODY MASS INDEX: 34.48 KG/M2 | HEIGHT: 61 IN | OXYGEN SATURATION: 98 %

## 2020-07-27 DIAGNOSIS — E78.00 PURE HYPERCHOLESTEROLEMIA: ICD-10-CM

## 2020-07-27 DIAGNOSIS — I50.32 CHRONIC DIASTOLIC CONGESTIVE HEART FAILURE (HCC): ICD-10-CM

## 2020-07-27 DIAGNOSIS — E66.9 OBESITY (BMI 30.0-34.9): ICD-10-CM

## 2020-07-27 DIAGNOSIS — I10 ESSENTIAL HYPERTENSION: Primary | ICD-10-CM

## 2020-07-27 RX ORDER — ROSUVASTATIN CALCIUM 10 MG/1
10 TABLET, COATED ORAL
Qty: 90 TAB | Refills: 1 | Status: SHIPPED | OUTPATIENT
Start: 2020-07-27 | End: 2022-09-16 | Stop reason: SDUPTHER

## 2020-07-27 NOTE — PATIENT INSTRUCTIONS
Medications Discontinued During This Encounter   Medication Reason    losartan (COZAAR) 940 mg tablet Duplicate Order    cycloSPORINE (RESTASIS) 0.05 % ophthalmic emulsion     Dexlansoprazole (DEXILANT) 60 mg CpDB     rosuvastatin (CRESTOR) 10 mg tablet           Body Mass Index: Care Instructions  Your Care Instructions     Body mass index (BMI) can help you see if your weight is raising your risk for health problems. It uses a formula to compare how much you weigh with how tall you are. · A BMI lower than 18.5 is considered underweight. · A BMI between 18.5 and 24.9 is considered healthy. · A BMI between 25 and 29.9 is considered overweight. A BMI of 30 or higher is considered obese. If your BMI is in the normal range, it means that you have a lower risk for weight-related health problems. If your BMI is in the overweight or obese range, you may be at increased risk for weight-related health problems, such as high blood pressure, heart disease, stroke, arthritis or joint pain, and diabetes. If your BMI is in the underweight range, you may be at increased risk for health problems such as fatigue, lower protection (immunity) against illness, muscle loss, bone loss, hair loss, and hormone problems. BMI is just one measure of your risk for weight-related health problems. You may be at higher risk for health problems if you are not active, you eat an unhealthy diet, or you drink too much alcohol or use tobacco products. Follow-up care is a key part of your treatment and safety. Be sure to make and go to all appointments, and call your doctor if you are having problems. It's also a good idea to know your test results and keep a list of the medicines you take. How can you care for yourself at home? · Practice healthy eating habits. This includes eating plenty of fruits, vegetables, whole grains, lean protein, and low-fat dairy. · If your doctor recommends it, get more exercise. Walking is a good choice.  Bit by bit, increase the amount you walk every day. Try for at least 30 minutes on most days of the week. · Do not smoke. Smoking can increase your risk for health problems. If you need help quitting, talk to your doctor about stop-smoking programs and medicines. These can increase your chances of quitting for good. · Limit alcohol to 2 drinks a day for men and 1 drink a day for women. Too much alcohol can cause health problems. If you have a BMI higher than 25  · Your doctor may do other tests to check your risk for weight-related health problems. This may include measuring the distance around your waist. A waist measurement of more than 40 inches in men or 35 inches in women can increase the risk of weight-related health problems. · Talk with your doctor about steps you can take to stay healthy or improve your health. You may need to make lifestyle changes to lose weight and stay healthy, such as changing your diet and getting regular exercise. If you have a BMI lower than 18.5  · Your doctor may do other tests to check your risk for health problems. · Talk with your doctor about steps you can take to stay healthy or improve your health. You may need to make lifestyle changes to gain or maintain weight and stay healthy, such as getting more healthy foods in your diet and doing exercises to build muscle. Where can you learn more? Go to http://christina-jose de jesus.info/  Enter S176 in the search box to learn more about \"Body Mass Index: Care Instructions. \"  Current as of: December 11, 2019               Content Version: 12.5  © 5425-2384 [employer]. Care instructions adapted under license by [employer] (which disclaims liability or warranty for this information). If you have questions about a medical condition or this instruction, always ask your healthcare professional. [place] [address] any warranty or liability for your use of this information.

## 2020-07-27 NOTE — PROGRESS NOTES
1. Have you been to the ER, urgent care clinic since your last visit? Hospitalized since your last visit? No    2. Have you seen or consulted any other health care providers outside of the [address] [place] [address] since your last visit? Include any pap smears or colon screening. Yes Where: Oncology Reason for visit: Routine Visit     3. Since your last visit, have you had any of the following symptoms? Swelling in legs       4. Have you had any blood work, X-rays or cardiac testing? No    5. Where do you normally have your labs drawn? [facility]    6. Do you need any refills today?    No

## 2020-07-27 NOTE — PROGRESS NOTES
Hdietary management education, guidance, and counselingistory of present ILLNESS  Hidla Kim is a 77 y.o. female. 5/19 patient tolerating Crestor once or twice a week. LDL has shown improvement. She also has right lower anterior chest discomfort which is positional and with breathing and is being worked up with bone scan. 1/19 patient is able to tolerate Lipitor 10 mg once in 1-2 weeks only. LDL has increased again. 11/16 seen for abn EKG, h/o 45-50%EF; had jaw pain and left neck discomfort- worse with bending over  10/16 had SOB/edema- improved now. Had high BP which is being treated now. Was drinking extra fluids due to renal stone which has now been passed and will not need lithotripsy now        CHF   The history is provided by the medical records. This is a chronic problem. Pertinent negatives include no chest pain, no headaches and no shortness of breath. Hypertension   The history is provided by the patient. This is a new problem. The current episode started more than 1 week ago (treatment started 10/16 again after some yrs). Pertinent negatives include no chest pain, no headaches and no shortness of breath. Cholesterol Problem   The history is provided by the medical records. This is a chronic problem. Pertinent negatives include no chest pain, no headaches and no shortness of breath. Leg Swelling   The history is provided by the patient. This is a chronic problem. The current episode started more than 1 week ago. The problem occurs rarely (once in 3-4 months). The problem has not changed since onset. Pertinent negatives include no chest pain, no headaches and no shortness of breath. The symptoms are aggravated by standing. The symptoms are relieved by sleep. Review of Systems   Constitutional: Negative for chills, fever, malaise/fatigue and weight loss. HENT: Negative for nosebleeds. Eyes: Negative for discharge.    Respiratory: Negative for cough, shortness of breath and wheezing. Cardiovascular: Positive for leg swelling. Negative for chest pain, palpitations, orthopnea, claudication and PND. Gastrointestinal: Negative for diarrhea, nausea and vomiting. Genitourinary: Negative for dysuria and hematuria. Musculoskeletal: Negative for joint pain. Skin: Negative for rash. Neurological: Negative for dizziness, seizures, loss of consciousness and headaches. Endo/Heme/Allergies: Negative for polydipsia. Does not bruise/bleed easily. Psychiatric/Behavioral: Negative for depression and substance abuse. The patient does not have insomnia.       Allergies   Allergen Reactions    Lipitor [Atorvastatin] Myalgia     Quit 2/17    Nsaids (Non-Steroidal Anti-Inflammatory Drug) Other (comments)     Severe abdominal pain    Pcn [Penicillins] Swelling     Swelling, hives & photosensitive rxn    Statins-Hmg-Coa Reductase Inhibitors Other (comments)     Lag crumps       Past Medical History:   Diagnosis Date    Acute reaction to stress     Breast cancer (HCC)     right breast cancer    Closed fracture of left wrist     Diastolic dysfunction     Fatty liver     GERD (gastroesophageal reflux disease)     Hyperlipidemia     Hypertension     diastolic dysfunction    Hypoglycemia     Kidney stones     11/16 for yrs; spon passage always so far    Radiation therapy complication     RSD (reflex sympathetic dystrophy) 8/2009    no blood pressure or sticks in left arm    Syncope 2000    no recurrence since; was orthostatic at that time    Unspecified adverse effect of anesthesia     hard to sedate       Family History   Problem Relation Age of Onset    Diabetes Maternal Aunt     Cancer Maternal Uncle         throat    Cancer Maternal Grandmother         melanoma    Heart Disease Other     Heart Attack Paternal Grandmother 48    Hypertension Mother     Cancer Maternal Grandfather         Melanoma    Stroke Neg Hx        Social History     Tobacco Use    Smoking status: Former Smoker     Packs/day: 0.50     Years: 11.00     Pack years: 5.50     Last attempt to quit: 2003     Years since quittin.5    Smokeless tobacco: Never Used   Substance Use Topics    Alcohol use: Yes     Alcohol/week: 1.0 standard drinks     Types: 1 Glasses of wine per week     Frequency: Monthly or less     Drinks per session: 1 or 2     Comment: rarely- 3-4 drinks/yr    Drug use: No        Current Outpatient Medications   Medication Sig    labetalol (NORMODYNE) 200 mg tablet TAKE 1 TABLET BY MOUTH TWICE DAILY    famotidine (PEPCID) 20 mg tablet Take 20 mg by mouth two (2) times a day.  denosumab (PROLIA) 60 mg/mL injection 60 mg by SubCUTAneous route.  diclofenac (VOLTAREN) 1 % gel Apply 2 g to affected area every six (6) hours as needed for Pain (right mid-anterior ribs).  losartan (COZAAR) 100 mg tablet TAKE 1 TABLET BY MOUTH EVERY DAY    rosuvastatin (CRESTOR) 10 mg tablet Take 1 Tab by mouth nightly. (Patient taking differently: Take 10 mg by mouth nightly. Taking approx every 2 weeks)    acetaminophen (TYLENOL) 325 mg tablet Take  by mouth every four (4) hours as needed for Pain.  furosemide (LASIX) 20 mg tablet TAKE 1 TABLET BY MOUTH DAILY AS NEEDED    cholecalciferol, VITAMIN D3, (VITAMIN D3) 5,000 unit tab tablet Take  by mouth daily.  letrozole (FEMARA) 2.5 mg tablet Take 2.5 mg by mouth daily. No current facility-administered medications for this visit. Past Surgical History:   Procedure Laterality Date    HX CARPAL TUNNEL RELEASE      HX GYN      lap. fibroid removal    HX HEART CATHETERIZATION   approx    normal coronaries per pt    HX MASTECTOMY Right 2017    RIGHT BREAST LUMPECTOMY WITH NEEDLE LOCALIZATION Oswaldo Storey LYMPH NODE BIOPSY  performed by Marky Jordan MD at [address] HX ORTHOPAEDIC      ORIF left wrist    HX SHOULDER ARTHROSCOPY Left        Diagnostic Studies:   Old records reviewed and show as follows  EKG tracings reviewed by me today. 11/16 Nuc Stress  Conclusion:    1. Normal perfusion scan.    2. Normal wall motion and ejection fraction.    3. Low risk scan. Visit Vitals  /67 (BP 1 Location: Left arm, BP Patient Position: Sitting)   Pulse 74   Temp 97.7 °F (36.5 °C) (Temporal)   Ht 5' 1\" (1.549 m)   Wt 82.8 kg (182 lb 9.6 oz)   SpO2 98%   BMI 34.50 kg/m²       Ms. Trisha Lee has a reminder for a \"due or due soon\" health maintenance. I have asked that she contact her primary care provider for follow-up on this health maintenance. Physical Exam   Constitutional: She is oriented to person, place, and time. She appears well-developed and well-nourished. No distress. obese   HENT:   Head: Normocephalic and atraumatic. Mouth/Throat: Normal dentition. Eyes: Right eye exhibits no discharge. Left eye exhibits no discharge. No scleral icterus. Neck: Neck supple. No JVD present. Carotid bruit is not present. No thyromegaly present. Cardiovascular: Normal rate, regular rhythm, S1 normal, S2 normal, normal heart sounds and intact distal pulses. Exam reveals no gallop and no friction rub. No murmur heard. Pulmonary/Chest: Effort normal and breath sounds normal. She has no wheezes. She has no rales. Abdominal: Soft. She exhibits no mass. There is no abdominal tenderness. Musculoskeletal:         General: No edema. Lymphadenopathy:        Right cervical: No superficial cervical adenopathy present. Left cervical: No superficial cervical adenopathy present. Neurological: She is alert and oriented to person, place, and time. Skin: Skin is warm and dry. No rash noted. Psychiatric: She has a normal mood and affect. Her behavior is normal.       ASSESSMENT and PLAN    I have reviewed/discussed the above normal BMI with the patient. I have recommended the following interventions: dietary management education, guidance, and counseling, encourage exercise and monitor weight .       AAC CVD risk is calculated at 9.2% over 10 years. She has tried different statins and cannot tolerate. She is also try to lose weight. She tries to follow diet but is not very regular about her exercise. She has significant joint pains and history of breast cancer and takes Femara. Hannah Montelongo HLD : Results for Talon Carpio (MRN [id number]) as of 7/27/2020 10:25   Ref. Range 1/31/2019 08:52 5/23/2019 08:06   Triglyceride Latest Ref Range: <150 MG/ 134   Cholesterol, total Latest Ref Range: <200 MG/ (H) 186   HDL Cholesterol Latest Ref Range: 40 - 60 MG/DL 56 59   CHOL/HDL Ratio Latest Ref Range: 0 - 5.0   4.2 3.2   VLDL, calculated Latest Units: MG/DL 24.8 26.8   LDL, calculated Latest Ref Range: 0 - 100 MG/.2 (H) 100.2 (H)       NYHA2    Chest pain appears to be musculoskeletal. Confirmed by her oncologist and orthopedics. LDL has reduced well with Crestor once or twice a week. BP controlled well. Diet weight and exercise discussed. Diagnoses and all orders for this visit:    1. Essential hypertension  -     AMB POC EKG ROUTINE W/ 12 LEADS, INTER & REP  -     LIPID PANEL; Future  -     HEPATIC FUNCTION PANEL; Future  -     METABOLIC PANEL, BASIC; Future    2. Pure hypercholesterolemia  -     LIPID PANEL; Future  -     HEPATIC FUNCTION PANEL; Future  -     METABOLIC PANEL, BASIC; Future  -     rosuvastatin (CRESTOR) 10 mg tablet; Take 1 Tab by mouth nightly. 3. Chronic diastolic congestive heart failure (HCC)    4. Obesity (BMI 30.0-34. 9)        Pertinent laboratory and test data reviewed and discussed with patient.   See patient instructions also for other medical advice given    Medications Discontinued During This Encounter   Medication Reason    losartan (COZAAR) 017 mg tablet Duplicate Order    cycloSPORINE (RESTASIS) 0.05 % ophthalmic emulsion     Dexlansoprazole (DEXILANT) 60 mg CpDB     rosuvastatin (CRESTOR) 10 mg tablet        Follow-up and Dispositions    · Return in about 1 year (around 7/27/2021), or if symptoms worsen or fail to improve, for with ekg.

## 2020-11-04 ENCOUNTER — HOSPITAL ENCOUNTER (OUTPATIENT)
Dept: MAMMOGRAPHY | Age: 70
Discharge: HOME | End: 2020-11-04
Attending: PHYSICIAN ASSISTANT
Payer: MEDICARE

## 2020-11-04 ENCOUNTER — HOSPITAL ENCOUNTER (OUTPATIENT)
Dept: ULTRASOUND IMAGING | Age: 70
Discharge: HOME | End: 2020-11-04
Attending: PHYSICIAN ASSISTANT
Payer: MEDICARE

## 2020-11-04 DIAGNOSIS — C50.411 MALIGNANT NEOPLASM OF UPPER-OUTER QUADRANT OF RIGHT FEMALE BREAST (HCC): ICD-10-CM

## 2020-11-04 PROCEDURE — 77061 BREAST TOMOSYNTHESIS UNI: CPT

## 2020-12-01 ENCOUNTER — OFFICE VISIT (OUTPATIENT)
Dept: FAMILY MEDICINE CLINIC | Age: 70
End: 2020-12-01
Payer: MEDICARE

## 2020-12-01 ENCOUNTER — HOSPITAL ENCOUNTER (OUTPATIENT)
Dept: LAB | Age: 70
Discharge: HOME | End: 2020-12-01
Payer: MEDICARE

## 2020-12-01 VITALS
TEMPERATURE: 96.7 F | OXYGEN SATURATION: 98 % | HEART RATE: 85 BPM | RESPIRATION RATE: 12 BRPM | DIASTOLIC BLOOD PRESSURE: 77 MMHG | SYSTOLIC BLOOD PRESSURE: 132 MMHG

## 2020-12-01 DIAGNOSIS — Z13.39 SCREENING FOR ALCOHOLISM: ICD-10-CM

## 2020-12-01 DIAGNOSIS — R05.9 COUGH: ICD-10-CM

## 2020-12-01 DIAGNOSIS — R68.83 CHILLS: ICD-10-CM

## 2020-12-01 DIAGNOSIS — J01.00 ACUTE NON-RECURRENT MAXILLARY SINUSITIS: Primary | ICD-10-CM

## 2020-12-01 DIAGNOSIS — I10 ESSENTIAL HYPERTENSION: ICD-10-CM

## 2020-12-01 DIAGNOSIS — J02.9 SORE THROAT: ICD-10-CM

## 2020-12-01 DIAGNOSIS — Z11.59 ENCOUNTER FOR HEPATITIS C SCREENING TEST FOR LOW RISK PATIENT: ICD-10-CM

## 2020-12-01 DIAGNOSIS — E78.00 PURE HYPERCHOLESTEROLEMIA: ICD-10-CM

## 2020-12-01 DIAGNOSIS — J01.00 ACUTE NON-RECURRENT MAXILLARY SINUSITIS: ICD-10-CM

## 2020-12-01 DIAGNOSIS — Z00.00 MEDICARE ANNUAL WELLNESS VISIT, SUBSEQUENT: Primary | ICD-10-CM

## 2020-12-01 LAB
FLUAV+FLUBV AG NOSE QL IA.RAPID: NEGATIVE
FLUAV+FLUBV AG NOSE QL IA.RAPID: NEGATIVE
S PYO AG THROAT QL: NEGATIVE
VALID INTERNAL CONTROL?: YES
VALID INTERNAL CONTROL?: YES

## 2020-12-01 PROCEDURE — G8432 DEP SCR NOT DOC, RNG: HCPCS | Performed by: NURSE PRACTITIONER

## 2020-12-01 PROCEDURE — G8754 DIAS BP LESS 90: HCPCS | Performed by: NURSE PRACTITIONER

## 2020-12-01 PROCEDURE — 99213 OFFICE O/P EST LOW 20 MIN: CPT | Performed by: NURSE PRACTITIONER

## 2020-12-01 PROCEDURE — G8752 SYS BP LESS 140: HCPCS | Performed by: NURSE PRACTITIONER

## 2020-12-01 PROCEDURE — 87635 SARS-COV-2 COVID-19 AMP PRB: CPT

## 2020-12-01 PROCEDURE — 3017F COLORECTAL CA SCREEN DOC REV: CPT | Performed by: NURSE PRACTITIONER

## 2020-12-01 PROCEDURE — G8399 PT W/DXA RESULTS DOCUMENT: HCPCS | Performed by: NURSE PRACTITIONER

## 2020-12-01 PROCEDURE — 1101F PT FALLS ASSESS-DOCD LE1/YR: CPT | Performed by: NURSE PRACTITIONER

## 2020-12-01 PROCEDURE — G9899 SCRN MAM PERF RSLTS DOC: HCPCS | Performed by: NURSE PRACTITIONER

## 2020-12-01 PROCEDURE — G8536 NO DOC ELDER MAL SCRN: HCPCS | Performed by: NURSE PRACTITIONER

## 2020-12-01 PROCEDURE — 87880 STREP A ASSAY W/OPTIC: CPT | Performed by: NURSE PRACTITIONER

## 2020-12-01 PROCEDURE — G8427 DOCREV CUR MEDS BY ELIG CLIN: HCPCS | Performed by: NURSE PRACTITIONER

## 2020-12-01 PROCEDURE — G8428 CUR MEDS NOT DOCUMENT: HCPCS | Performed by: NURSE PRACTITIONER

## 2020-12-01 PROCEDURE — 1090F PRES/ABSN URINE INCON ASSESS: CPT | Performed by: NURSE PRACTITIONER

## 2020-12-01 PROCEDURE — G8756 NO BP MEASURE DOC: HCPCS | Performed by: NURSE PRACTITIONER

## 2020-12-01 PROCEDURE — G8417 CALC BMI ABV UP PARAM F/U: HCPCS | Performed by: NURSE PRACTITIONER

## 2020-12-01 PROCEDURE — G0439 PPPS, SUBSEQ VISIT: HCPCS | Performed by: NURSE PRACTITIONER

## 2020-12-01 PROCEDURE — 87804 INFLUENZA ASSAY W/OPTIC: CPT | Performed by: NURSE PRACTITIONER

## 2020-12-01 RX ORDER — ABALOPARATIDE 2000 UG/ML
INJECTION, SOLUTION SUBCUTANEOUS
COMMUNITY
End: 2021-07-26

## 2020-12-01 RX ORDER — AZITHROMYCIN 250 MG/1
TABLET, FILM COATED ORAL
Qty: 6 TAB | Refills: 0 | Status: SHIPPED | OUTPATIENT
Start: 2020-12-01 | End: 2020-12-06

## 2020-12-01 RX ORDER — OMEPRAZOLE/SODIUM BICARBONATE 20MG-1.1G
1 CAPSULE ORAL DAILY
COMMUNITY

## 2020-12-01 NOTE — PROGRESS NOTES
SUBJECTIVE:  Chief Complaint   Patient presents with    Headache     stared 11/27/20     went to [place] week before thanksgiving    Cough     runny nose    Sore Throat     chest tightness    Nausea     chills    Fatigue     sob     Patient states she suffers from yearly sinusitis. She is complaining of pressure behind her eyes and the bridge of her nose. She states this pain can be unbearable at times. She is taking Sudafed that was effective in the beginning but is no longer. Patient denies recent travel. Pt exposed to sick contacts under investigations for possible Covid UNKNOWN. Patient is a retired nurse. She traveled by car 1 week prior to Thanksgiving to [place] to visit her mother. Patient states she wears her mask everywhere she goes and she is very cautious around others. She did state she was walking her dog the other day when some \"crazy lady\" approached her and started yelling in her face, neither one of them handling masks. Pt is not a current smoker. OBJECTIVE    Visit Vitals  /77   Pulse 85   Temp (!) 96.7 °F (35.9 °C) (Oral)   Resp 12   SpO2 98%      General:  healthy, well nourished, cooperative and pleasant. Sick but not toxic appearing. Eyes:   The lids are without swelling, lesions, or drainage. The conjunctiva is clear and noninjected. ENT:  ENT exam normal, no neck nodes or sinus tenderness and throat normal without erythema or exudate. Neck: normal and no adenopathy. Lungs/CV: clear to auscultation, no wheezes or rales and unlabored breathing. Heart: regular rhythm normal rate. Skin:  No rashes, no jaundice. ASSESSMENT / PLAN     ICD-10-CM ICD-9-CM    1. Acute non-recurrent maxillary sinusitis  J01.00 461.0 azithromycin (ZITHROMAX) 250 mg tablet      NOVEL CORONAVIRUS (COVID-19)   2. Cough  R05 786.2 AMB POC RAPID INFLUENZA TEST      AMB POC RAPID STREP A      NOVEL CORONAVIRUS (COVID-19)   3.  Sore throat  J02.9 462 AMB POC RAPID INFLUENZA TEST      AMB POC RAPID STREP A      NOVEL CORONAVIRUS (COVID-19)   4. Bhavik  R68.83 780.64 AMB POC RAPID INFLUENZA TEST      AMB POC RAPID STREP A      NOVEL CORONAVIRUS (COVID-19)       Based on CDC recommendations and limited testing supplies, only those patients who meet criteria will be tested for Covid. High priority groups for testing   Symptomatic and/or Exposure /Test for Covid  Immunocompromised host (on prednisone, biological therapy, blood cancer, metastatic cancer or active chemotherapy)   [facility] worker in the home    Other high-risk group: o age >47   o Uncontrolled DM   o Uncontrolled HTN   o BMI >40, CKD/ESRD    Dialysis patients (patients going to HD units, not asymptomatic home HD/PD)    Anyone living in a congregate setting     Non High-risk patient category           Test for COVID-19   Asymptomatic, no known exposure  No    Asymptomatic, possible exposure  No    Asymptomatic, definite exposure  Provider discretion    Symptomatic, no known exposure  Yes    Symptomatic, + exposure  Yes      Negative for strep. Negative for Influenza. Patient does  meet criteria for Covid testing. COVID-19 testing was completed. Covid Instructions: Instructed pt on the importance of rest, fluid intake (with avoidance of red fluids), and vit C supplements. Instructed pt to check temp if possible and to take acetaminophen or NSAIDs if fevers are noted. Instructed patient to remember to wash hands, disinfect surroundings, and avoid touching face. Instructed pt to remain home and and self quarantine until Covid results are negative and all symptoms have improved or subsided. If they must leave home, wear a mask. Patient verbalized understanding. We have provided the patient with a detailed after visit summary which was reviewed, and red flag symptoms that would warrant an ER visit were emphasized.       Dr. Deangelo Connell, AGNP-C, DNP      This visit was provided as a focused evaluation during the COVID -19 pandemic/national emergency. A comprehensive review of all previous patient history and testing was not conducted. Pertinent findings were elicited during the visit.

## 2020-12-01 NOTE — PROGRESS NOTES
Mar Sweeney presents today for   Chief Complaint   Patient presents with    Headache     stared 11/27/20     went to [place] week before thanksgiving    Cough     runny nose    Sore Throat     chest tightness    Nausea     chills    Fatigue       Is someone accompanying this pt? no    Is the patient using any DME equipment during OV? no    Travel and Exposure Screening was performed during check in or rooming process Yes  No      Depression Screening:  3 most recent PHQ Screens 12/1/2020   Little interest or pleasure in doing things Not at all   Feeling down, depressed, irritable, or hopeless Not at all   Total Score PHQ 2 0       Fall Risk  Fall Risk Assessment, last 12 mths 12/1/2020   Able to walk? Yes   Fall in past 12 months?  No       This Visit Test  Results for orders placed or performed during the hospital encounter of 09/21/19   POC CREATININE   Result Value Ref Range    Creatinine, POC 0.9 0.6 - 1.3 MG/DL    GFRAA, POC >60 >60 ml/min/1.73m2    GFRNA, POC >60 >60 ml/min/1.73m2

## 2020-12-01 NOTE — PROGRESS NOTES
This is an Initial Medicare Annual Wellness Exam (AWV) (Performed 12 months after IPPE or effective date of Medicare Part B enrollment, Once in a lifetime)    I have reviewed the patient's medical history in detail and updated the computerized patient record. Depression Risk Factor Screening:     3 most recent PHQ Screens 12/1/2020   Little interest or pleasure in doing things Not at all   Feeling down, depressed, irritable, or hopeless Not at all   Total Score PHQ 2 0       Alcohol Risk Screen   Do you average more than 1 drink per night or more than 7 drinks a week:  No    On any one occasion in the past three months have you have had more than 3 drinks containing alcohol:  No        Functional Ability and Level of Safety:   Hearing: Hearing is good. Activities of Daily Living: The home contains: no safety equipment. Patient does total self care     Ambulation: no      Fall Risk:  Fall Risk Assessment, last 12 mths 12/1/2020   Able to walk? Yes   Fall in past 12 months? No     Abuse Screen:  Patient is not abused       Cognitive Screening   Has your family/caregiver stated any concerns about your memory: no     Cognitive Screening: Normal - Clock Drawing Test    Assessment/Plan   Education and counseling provided:  Are appropriate based on today's review and evaluation    Diagnoses and all orders for this visit:    1. Encounter for hepatitis C screening test for low risk patient  -     HEPATITIS B CORE AB, TOTAL; Future    2. Essential hypertension  -     CBC WITH AUTOMATED DIFF; Future  -     METABOLIC PANEL, COMPREHENSIVE; Future    3. Pure hypercholesterolemia  -     LIPID PANEL; Future    4. Medicare annual wellness visit, subsequent    5.  Screening for alcoholism         Health Maintenance Due     Health Maintenance Due   Topic Date Due    Hepatitis C Screening  1950    DTaP/Tdap/Td series (1 - Tdap) 08/25/1972    Shingrix Vaccine Age 50> (1 of 2) 08/25/2001    GLAUCOMA SCREENING Q2Y 08/25/2016    Pneumococcal 65+ years (1 of 1 - PPSV23) 08/25/2016    Medicare Yearly Exam  03/28/2018    Lipid Screen  05/23/2020    Flu Vaccine (1) 09/01/2020       Patient Care Team   Patient Care Team:  Edin Turner NP as PCP - General (Nurse Practitioner)  Edin Turner [facility] P[facility]  Katarzyna Earl MD as Physician (Urology)    History     Patient Active Problem List   Diagnosis Code    Rogers's esophagus K22.70    Other dysphagia R13.19    Acute reaction to stress F43.0    Essential hypertension I10    Pure hypercholesterolemia E78.00    Obesity (BMI 30.0-34. 9) E66.9    Acute diastolic CHF (congestive heart failure) (HCC) I50.31    Chronic diastolic congestive heart failure (HCC) I50.32    Personal history of breast cancer Z85.3    Breast cancer of upper-outer quadrant of right female breast ([facility] [address] C50.411     Past Medical History:   Diagnosis Date    Acute reaction to stress     Breast cancer (HCC)     right breast cancer    Closed fracture of left wrist     Diastolic dysfunction     Fatty liver     GERD (gastroesophageal reflux disease)     Hyperlipidemia     Hypertension     diastolic dysfunction    Hypoglycemia     Kidney stones     11/16 for yrs; spon passage always so far    Radiation therapy complication     RSD (reflex sympathetic dystrophy) 8/2009    no blood pressure or sticks in left arm    Syncope 2000    no recurrence since; was orthostatic at that time    Unspecified adverse effect of anesthesia     hard to sedate      Past Surgical History:   Procedure Laterality Date    HX CARPAL TUNNEL RELEASE      HX GYN      lap. fibroid removal    HX HEART CATHETERIZATION  2005 approx    normal coronaries per pt    HX MASTECTOMY Right 11/30/2017    RIGHT BREAST LUMPECTOMY WITH NEEDLE LOCALIZATION Chicho Beaulieu LYMPH NODE BIOPSY  performed by Norah Chambers MD at [address] [place] [address] HX ORTHOPAEDIC      ORIF left wrist    HX SHOULDER ARTHROSCOPY Left      Current Outpatient Medications   Medication Sig Dispense Refill    abaloparatide (Tymlos) 80 mcg (3,120 mcg/1.56 mL) pnij by SubCUTAneous route.  rosuvastatin (CRESTOR) 10 mg tablet Take 1 Tab by mouth nightly. 90 Tab 1    labetalol (NORMODYNE) 200 mg tablet TAKE 1 TABLET BY MOUTH TWICE DAILY 180 Tab 2    famotidine (PEPCID) 20 mg tablet Take 20 mg by mouth two (2) times a day.  diclofenac (VOLTAREN) 1 % gel Apply 2 g to affected area every six (6) hours as needed for Pain (right mid-anterior ribs). 200 g 1    losartan (COZAAR) 100 mg tablet TAKE 1 TABLET BY MOUTH EVERY DAY 90 Tab 3    acetaminophen (TYLENOL) 325 mg tablet Take  by mouth every four (4) hours as needed for Pain.  furosemide (LASIX) 20 mg tablet TAKE 1 TABLET BY MOUTH DAILY AS NEEDED 30 Tab 0    cholecalciferol, VITAMIN D3, (VITAMIN D3) 5,000 unit tab tablet Take  by mouth daily.  letrozole (FEMARA) 2.5 mg tablet Take 2.5 mg by mouth daily. Allergies   Allergen Reactions    Lipitor [Atorvastatin] Myalgia     Quit     Nsaids (Non-Steroidal Anti-Inflammatory Drug) Other (comments)     Severe abdominal pain    Pcn [Penicillins] Swelling     Swelling, hives & photosensitive rxn    Statins-Hmg-Coa Reductase Inhibitors Other (comments)     Lag crumps       Family History   Problem Relation Age of Onset    Diabetes Maternal Aunt     Cancer Maternal Uncle         throat    Cancer Maternal Grandmother         melanoma    Heart Disease Other     Heart Attack Paternal Grandmother 48    Hypertension Mother     Cancer Maternal Grandfather         Melanoma    Stroke Neg Hx      Social History     Tobacco Use    Smoking status: Former Smoker     Packs/day: 0.50     Years: 11.00     Pack years: 5.50     Last attempt to quit: 2003     Years since quittin.9    Smokeless tobacco: Never Used   Substance Use Topics    Alcohol use:  Yes     Alcohol/week: 1.0 standard drinks     Types: 1 Glasses of wine per week     Frequency: Monthly or less     Drinks per session: 1 or 2     Comment: rarely- 3-4 drinks/yr       Felipa Edwards, who was evaluated through a synchronous (real-time) audio only encounter, and/or her healthcare decision maker, is aware that it is a billable service, with coverage as determined by her insurance carrier. She provided verbal consent to proceed: n/a- consent obtained within past 12 months, and patient identification was verified. It was conducted pursuant to the emergency declaration under the [address] [place] [address], [address] [place] [address] authority and the [employer] Act. A caregiver was present when appropriate. Ability to conduct physical exam was limited. I was in the office. The patient was at home.       Minesh Khanna NP

## 2020-12-01 NOTE — PATIENT INSTRUCTIONS
Medicare Wellness Visit, Female The best way to live healthy is to have a lifestyle where you eat a well-balanced diet, exercise regularly, limit alcohol use, and quit all forms of tobacco/nicotine, if applicable. Regular preventive services are another way to keep healthy. Preventive services (vaccines, screening tests, monitoring & exams) can help personalize your care plan, which helps you manage your own care. Screening tests can find health problems at the earliest stages, when they are easiest to treat. [facility] follows the current, evidence-based guidelines published by the [facility] ([facility]) when recommending preventive services for our patients. Because we follow these guidelines, sometimes recommendations change over time as research supports it. (For example, mammograms used to be recommended annually. Even though Medicare will still pay for an annual mammogram, the newer guidelines recommend a mammogram every two years for women of average risk). Of course, you and your doctor may decide to screen more often for some diseases, based on your risk and your co-morbidities (chronic disease you are already diagnosed with). Preventive services for you include: - Medicare offers their members a free annual wellness visit, which is time for you and your primary care provider to discuss and plan for your preventive service needs. Take advantage of this benefit every year! 
-All adults over the age of 72 should receive the recommended pneumonia vaccines. Current USPSTF guidelines recommend a series of two vaccines for the best pneumonia protection.  
-All adults should have a flu vaccine yearly and a tetanus vaccine every 10 years.  
-All adults age 48 and older should receive the shingles vaccines (series of two vaccines). -All adults age 38-68 who are overweight should have a diabetes screening test once every three years. -All adults born between [birth year] and 1964 should be screened once for Hepatitis C. 
-Other screening tests and preventive services for persons with diabetes include: an eye exam to screen for diabetic retinopathy, a kidney function test, a foot exam, and stricter control over your cholesterol.  
-Cardiovascular screening for adults with routine risk involves an electrocardiogram (ECG) at intervals determined by your doctor.  
-Colorectal cancer screenings should be done for adults age 54-65 with no increased risk factors for colorectal cancer. There are a number of acceptable methods of screening for this type of cancer. Each test has its own benefits and drawbacks. Discuss with your doctor what is most appropriate for you during your annual wellness visit. The different tests include: colonoscopy (considered the best screening method), a fecal occult blood test, a fecal DNA test, and sigmoidoscopy. 
 
-A bone mass density test is recommended when a woman turns 65 to screen for osteoporosis. This test is only recommended one time, as a screening. Some providers will use this same test as a disease monitoring tool if you already have osteoporosis. -Breast cancer screenings are recommended every other year for women of normal risk, age 54-69. 
-Cervical cancer screenings for women over age 72 are only recommended with certain risk factors. Here is a list of your current Health Maintenance items (your personalized list of preventive services) with a due date: 
Health Maintenance Due Topic Date Due  
 Hepatitis C Test  1950  
 DTaP/Tdap/Td  (1 - Tdap) 08/25/1972  Shingles Vaccine (1 of 2) 08/25/2001  Glaucoma Screening   08/25/2016  Pneumococcal Vaccine (1 of 1 - PPSV23) 08/25/2016 [address] [place] [address] Annual Well Visit  03/28/2018  Cholesterol Test   05/23/2020  Yearly Flu Vaccine (1) 09/01/2020

## 2020-12-01 NOTE — PROGRESS NOTES
Amando Johnson is a 72 y.o. female who was seen by synchronous (real-time) audio-video technology on 12/1/2020 for Annual Wellness Visit and Establish Care (Former patient of Dr. Cornelious Lesches)        Assessment & Plan:   Diagnoses and all orders for this visit:    1. Encounter for hepatitis C screening test for low risk patient  -     HEPATITIS B CORE AB, TOTAL; Future    2. Essential hypertension  -     CBC WITH AUTOMATED DIFF; Future  -     METABOLIC PANEL, COMPREHENSIVE; Future    3. Pure hypercholesterolemia  -     LIPID PANEL; Future    4. Medicare annual wellness visit, subsequent    5. Screening for alcoholism    Fasting labs ordered  F/u in 6 months  No change to current treatment plan        Subjective: The patient presents for an Audio-visual teleconference appointment for hypertension, hyperlipidemia and osteoporosis. She is a prior patient of Dr. Cornelious Lesches and presents today to establish care. She is also managed by Dr. Zack Calderon for breast cancer. She presents complaining of intermittent dry cough, headache, sinus congestion and just \"feeling awful\". She has a scheduled appointment with the red clinic at [facility] today at 2:30 p.m. HTN- notes her BP is normally control around 120/80. She is compliant with the medication treatment plan and is prescribed Labetalol and Losartan daily. She has Furosemide on hand and if she feels \"winded\" will take a dose. She notes she generally take Furosemide twice yearly. She is managed by Cardiology- notes she cant tolerate statin but was given Crestor to take every 2 weeks. She is followed by Dr. Jaqui Ferrer for Osteoporosis and was started on Tymlos. Prior to Admission medications    Medication Sig Start Date End Date Taking? Authorizing Provider   abaloparatide (Tymlos) 80 mcg (3,120 mcg/1.56 mL) pnij by SubCUTAneous route. Yes Provider, Historical   rosuvastatin (CRESTOR) 10 mg tablet Take 1 Tab by mouth nightly.  7/27/20  Yes Jason Atkins MD labetalol (NORMODYNE) 200 mg tablet TAKE 1 TABLET BY MOUTH TWICE DAILY 1/7/20  Yes Stewart Domingo NP   famotidine (PEPCID) 20 mg tablet Take 20 mg by mouth two (2) times a day. Yes Provider, Historical   diclofenac (VOLTAREN) 1 % gel Apply 2 g to affected area every six (6) hours as needed for Pain (right mid-anterior ribs). 12/3/19  Yes Shoshana Frank,    losartan (COZAAR) 100 mg tablet TAKE 1 TABLET BY MOUTH EVERY DAY 11/15/19  Yes Rosalina Wick NP   acetaminophen (TYLENOL) 325 mg tablet Take  by mouth every four (4) hours as needed for Pain. Yes Provider, Historical   furosemide (LASIX) 20 mg tablet TAKE 1 TABLET BY MOUTH DAILY AS NEEDED 11/10/18  Yes Stewart Domingo NP   cholecalciferol, VITAMIN D3, (VITAMIN D3) 5,000 unit tab tablet Take  by mouth daily. Yes Provider, Historical   letrozole (FEMARA) 2.5 mg tablet Take 2.5 mg by mouth daily. Yes Provider, Historical   denosumab (PROLIA) 60 mg/mL injection 60 mg by SubCUTAneous route. 12/1/20  Provider, Historical     Patient Active Problem List   Diagnosis Code    Rogers's esophagus K22.70    Other dysphagia R13.19    Acute reaction to stress F43.0    Essential hypertension I10    Pure hypercholesterolemia E78.00    Obesity (BMI 30.0-34. 9) E66.9    Acute diastolic CHF (congestive heart failure) (HCC) I50.31    Chronic diastolic congestive heart failure (HCC) I50.32    Personal history of breast cancer Z85.3    Breast cancer of upper-outer quadrant of right female breast [facility]) C50.411     Patient Active Problem List    Diagnosis Date Noted    [facility] quadrant [facility] 01/29/2018    Personal history of breast cancer 12/13/2017    Chronic diastolic congestive heart failure ([facility] 07/18/2017    Acute diastolic CHF (congestive heart failure) ([facility] 11/11/2016    Obesity (BMI 30.0-34.9) 11/01/2016    Essential hypertension 10/28/2016    Pure hypercholesterolemia 10/28/2016    Acute reaction to stress     Rogers's esophagus 10/17/2012    Other dysphagia 10/17/2012     Current Outpatient Medications   Medication Sig Dispense Refill    abaloparatide (Tymlos) 80 mcg (3,120 mcg/1.56 mL) pnij by SubCUTAneous route.  rosuvastatin (CRESTOR) 10 mg tablet Take 1 Tab by mouth nightly. 90 Tab 1    labetalol (NORMODYNE) 200 mg tablet TAKE 1 TABLET BY MOUTH TWICE DAILY 180 Tab 2    famotidine (PEPCID) 20 mg tablet Take 20 mg by mouth two (2) times a day.  diclofenac (VOLTAREN) 1 % gel Apply 2 g to affected area every six (6) hours as needed for Pain (right mid-anterior ribs). 200 g 1    losartan (COZAAR) 100 mg tablet TAKE 1 TABLET BY MOUTH EVERY DAY 90 Tab 3    acetaminophen (TYLENOL) 325 mg tablet Take  by mouth every four (4) hours as needed for Pain.  furosemide (LASIX) 20 mg tablet TAKE 1 TABLET BY MOUTH DAILY AS NEEDED 30 Tab 0    cholecalciferol, VITAMIN D3, (VITAMIN D3) 5,000 unit tab tablet Take  by mouth daily.  letrozole (FEMARA) 2.5 mg tablet Take 2.5 mg by mouth daily.        Allergies   Allergen Reactions    Lipitor [Atorvastatin] Myalgia     Quit 2/17    Nsaids (Non-Steroidal Anti-Inflammatory Drug) Other (comments)     Severe abdominal pain    Pcn [Penicillins] Swelling     Swelling, hives & photosensitive rxn    Statins-Hmg-Coa Reductase Inhibitors Other (comments)     Lag crumps     Past Medical History:   Diagnosis Date    Acute reaction to stress     Breast cancer (HCC)     right breast cancer    Closed fracture of left wrist     Diastolic dysfunction     Fatty liver     GERD (gastroesophageal reflux disease)     Hyperlipidemia     Hypertension     diastolic dysfunction    Hypoglycemia     Kidney stones     11/16 for yrs; spon passage always so far    Radiation therapy complication     RSD (reflex sympathetic dystrophy) 8/2009    no blood pressure or sticks in left arm    Syncope 2000    no recurrence since; was orthostatic at that time    Unspecified adverse effect of anesthesia     hard to sedate       ROS    Constitutional: No apparent distress noted  General- \"feels awful\"  Eyes- negative visual changes  CV- denies chest pain, palpitation  Pul: cough  GI: negative nausea, flank pain, diarrhea, constipation  Urinary:- No dysuria or polyuria  MS- negative myalgia, negative joint pain  Neuro- headache  Skin- negative for rashes or lesions. Psych- denies any anxiety or depression    Objective:   No flowsheet data found.      [INSTRUCTIONS:  \"[x]\" Indicates a positive item  \"[]\" Indicates a negative item  -- DELETE ALL ITEMS NOT EXAMINED]    Constitutional: [x] Appears well-developed and well-nourished [x] No apparent distress      [] Abnormal -     Mental status: [x] Alert and awake  [x] Oriented to person/place/time [x] Able to follow commands    [] Abnormal -     Eyes:   EOM    [x]  Normal    [] Abnormal -   Sclera  [x]  Normal    [] Abnormal -          Discharge [x]  None visible   [] Abnormal -     HENT: [x] Normocephalic, atraumatic  [] Abnormal -   [x] Mouth/Throat: Mucous membranes are moist    External Ears [x] Normal  [] Abnormal -    Neck: [x] No visualized mass [] Abnormal -     Pulmonary/Chest: [x] Respiratory effort normal   [x] No visualized signs of difficulty breathing or respiratory distress        [] Abnormal -      Musculoskeletal:   [x] Normal gait with no signs of ataxia         [x] Normal range of motion of neck        [] Abnormal -     Neurological:        [x] No Facial Asymmetry (Cranial nerve 7 motor function) (limited exam due to video visit)          [x] No gaze palsy        [] Abnormal -          Skin:        [x] No significant exanthematous lesions or discoloration noted on facial skin         [] Abnormal -            Psychiatric:       [x] Normal Affect [] Abnormal -        [x] No Hallucinations    Other pertinent observable physical exam findings:-        We discussed the expected course, resolution and complications of the diagnosis(es) in detail. Medication risks, benefits, costs, interactions, and alternatives were discussed as indicated. I advised her to contact the office if her condition worsens, changes or fails to improve as anticipated. She expressed understanding with the diagnosis(es) and plan. Sebas Kwon, who was evaluated through a patient-initiated, synchronous (real-time) audio-video encounter, and/or her healthcare decision maker, is aware that it is a billable service, with coverage as determined by her insurance carrier. She provided verbal consent to proceed: Yes, and patient identification was verified. It was conducted pursuant to the emergency declaration under the [facility], [address] [place] [address] authority and the [employer] General Act. A caregiver was present when appropriate. Ability to conduct physical exam was limited. I was at home. The patient was at home.       Silvino Lopez NP

## 2020-12-05 LAB — SARS-COV-2, COV2NT: NOT DETECTED

## 2020-12-06 NOTE — PROGRESS NOTES
[facility] clinic nurses: Please notify pt their Covid test was Negative. If patients symptoms have not improved, they need to follow-up with their PCP. Remind pt to stay home but if they must leave home, take all precautions: wear a mask, continue social distancing, disinfect home/work areas, avoid touching the face, and sanitize hands frequently. If they need a return to work note, please provide. Thank you.

## 2020-12-07 NOTE — PROGRESS NOTES
Patient was called and verified 2 identifiers. Patient was notified that her covid test results were negative. Reminded patient to wear her mask, social distance and to wash hands frequently.

## 2020-12-10 RX ORDER — LOSARTAN POTASSIUM 100 MG/1
TABLET ORAL
Qty: 90 TAB | Refills: 3 | Status: SHIPPED | OUTPATIENT
Start: 2020-12-10 | End: 2021-12-20

## 2021-03-09 DIAGNOSIS — I10 ESSENTIAL HYPERTENSION: ICD-10-CM

## 2021-03-09 RX ORDER — LABETALOL 200 MG/1
TABLET, FILM COATED ORAL
Qty: 180 TAB | Refills: 2 | Status: SHIPPED | OUTPATIENT
Start: 2021-03-09 | End: 2021-12-09

## 2021-05-07 ENCOUNTER — TRANSCRIBE ORDER (OUTPATIENT)
Dept: SCHEDULING | Age: 71
End: 2021-05-07

## 2021-05-07 DIAGNOSIS — Z12.31 VISIT FOR SCREENING MAMMOGRAM: Primary | ICD-10-CM

## 2021-06-03 ENCOUNTER — HOSPITAL ENCOUNTER (OUTPATIENT)
Dept: MAMMOGRAPHY | Age: 71
Discharge: HOME | End: 2021-06-03
Attending: INTERNAL MEDICINE
Payer: MEDICARE

## 2021-06-03 DIAGNOSIS — R92.8 ABNORMAL MAMMOGRAM: ICD-10-CM

## 2021-06-03 DIAGNOSIS — R92.1 BREAST CALCIFICATIONS: ICD-10-CM

## 2021-06-03 PROCEDURE — 77062 BREAST TOMOSYNTHESIS BI: CPT

## 2021-07-26 ENCOUNTER — OFFICE VISIT (OUTPATIENT)
Dept: CARDIOLOGY CLINIC | Age: 71
End: 2021-07-26
Payer: MEDICARE

## 2021-07-26 VITALS
OXYGEN SATURATION: 96 % | BODY MASS INDEX: 33.04 KG/M2 | SYSTOLIC BLOOD PRESSURE: 121 MMHG | HEART RATE: 72 BPM | HEIGHT: 61 IN | WEIGHT: 175 LBS | DIASTOLIC BLOOD PRESSURE: 70 MMHG

## 2021-07-26 DIAGNOSIS — I10 ESSENTIAL HYPERTENSION: ICD-10-CM

## 2021-07-26 DIAGNOSIS — E66.9 OBESITY (BMI 30.0-34.9): ICD-10-CM

## 2021-07-26 DIAGNOSIS — E78.00 PURE HYPERCHOLESTEROLEMIA: ICD-10-CM

## 2021-07-26 DIAGNOSIS — I50.32 CHRONIC DIASTOLIC CONGESTIVE HEART FAILURE (HCC): Primary | ICD-10-CM

## 2021-07-26 PROCEDURE — G9899 SCRN MAM PERF RSLTS DOC: HCPCS | Performed by: INTERNAL MEDICINE

## 2021-07-26 PROCEDURE — G8427 DOCREV CUR MEDS BY ELIG CLIN: HCPCS | Performed by: INTERNAL MEDICINE

## 2021-07-26 PROCEDURE — G8752 SYS BP LESS 140: HCPCS | Performed by: INTERNAL MEDICINE

## 2021-07-26 PROCEDURE — G8399 PT W/DXA RESULTS DOCUMENT: HCPCS | Performed by: INTERNAL MEDICINE

## 2021-07-26 PROCEDURE — 99214 OFFICE O/P EST MOD 30 MIN: CPT | Performed by: INTERNAL MEDICINE

## 2021-07-26 PROCEDURE — 3017F COLORECTAL CA SCREEN DOC REV: CPT | Performed by: INTERNAL MEDICINE

## 2021-07-26 PROCEDURE — G8536 NO DOC ELDER MAL SCRN: HCPCS | Performed by: INTERNAL MEDICINE

## 2021-07-26 PROCEDURE — 93000 ELECTROCARDIOGRAM COMPLETE: CPT | Performed by: INTERNAL MEDICINE

## 2021-07-26 PROCEDURE — 1101F PT FALLS ASSESS-DOCD LE1/YR: CPT | Performed by: INTERNAL MEDICINE

## 2021-07-26 PROCEDURE — 1090F PRES/ABSN URINE INCON ASSESS: CPT | Performed by: INTERNAL MEDICINE

## 2021-07-26 PROCEDURE — G8432 DEP SCR NOT DOC, RNG: HCPCS | Performed by: INTERNAL MEDICINE

## 2021-07-26 PROCEDURE — G8417 CALC BMI ABV UP PARAM F/U: HCPCS | Performed by: INTERNAL MEDICINE

## 2021-07-26 PROCEDURE — G8754 DIAS BP LESS 90: HCPCS | Performed by: INTERNAL MEDICINE

## 2021-07-26 RX ORDER — RALOXIFENE HYDROCHLORIDE 60 MG/1
TABLET, FILM COATED ORAL
COMMUNITY
Start: 2021-05-02 | End: 2022-09-16 | Stop reason: SDUPTHER

## 2021-07-26 NOTE — PROGRESS NOTES
Hdietary management education, guidance, and counselingistory of present ILLNESS  Lesly Storey is a 72 y.o. female. 5/19 patient tolerating Crestor once or twice a week. LDL has shown improvement. She also has right lower anterior chest discomfort which is positional and with breathing and is being worked up with bone scan. 1/19 patient is able to tolerate Lipitor 10 mg once in 1-2 weeks only. LDL has increased again. 11/16 seen for abn EKG, h/o 45-50%EF; had jaw pain and left neck discomfort- worse with bending over  10/16 had SOB/edema- improved now. Had high BP which is being treated now. Was drinking extra fluids due to renal stone which has now been passed and will not need lithotripsy now        Hypertension  The history is provided by the patient. This is a new problem. The current episode started more than 1 week ago (treatment started 10/16 again after some yrs). Pertinent negatives include no chest pain, no headaches and no shortness of breath. CHF  The history is provided by the medical records. This is a chronic problem. Pertinent negatives include no chest pain, no headaches and no shortness of breath. Cholesterol Problem  The history is provided by the medical records. This is a chronic problem. Pertinent negatives include no chest pain, no headaches and no shortness of breath. Leg Swelling  The history is provided by the patient. This is a chronic problem. The current episode started more than 1 week ago. The problem occurs rarely (once in 3-4 months). The problem has not changed since onset. Pertinent negatives include no chest pain, no headaches and no shortness of breath. The symptoms are aggravated by standing. The symptoms are relieved by sleep. Review of Systems   Constitutional: Negative for chills, fever, malaise/fatigue and weight loss. HENT: Negative for nosebleeds. Eyes: Negative for discharge.    Respiratory: Negative for cough, shortness of breath and wheezing. Cardiovascular: Positive for leg swelling. Negative for chest pain, palpitations, orthopnea, claudication and PND. Gastrointestinal: Negative for diarrhea, nausea and vomiting. Genitourinary: Negative for dysuria and hematuria. Musculoskeletal: Negative for joint pain. Skin: Negative for rash. Neurological: Negative for dizziness, seizures, loss of consciousness and headaches. Endo/Heme/Allergies: Negative for polydipsia. Does not bruise/bleed easily. Psychiatric/Behavioral: Negative for depression and substance abuse. The patient does not have insomnia.       Allergies   Allergen Reactions    Lipitor [Atorvastatin] Myalgia     Quit 2/17    Nsaids (Non-Steroidal Anti-Inflammatory Drug) Other (comments)     Severe abdominal pain    Pcn [Penicillins] Swelling     Swelling, hives & photosensitive rxn    Statins-Hmg-Coa Reductase Inhibitors Other (comments)     Lag crumps       Past Medical History:   Diagnosis Date    Acute reaction to stress     Breast cancer (HCC)     right breast cancer    Closed fracture of left wrist     Diastolic dysfunction     Fatty liver     GERD (gastroesophageal reflux disease)     Hyperlipidemia     Hypertension     diastolic dysfunction    Hypoglycemia     Kidney stones     11/16 for yrs; spon passage always so far    Radiation therapy complication     RSD (reflex sympathetic dystrophy) 8/2009    no blood pressure or sticks in left arm    Syncope 2000    no recurrence since; was orthostatic at that time    Unspecified adverse effect of anesthesia     hard to sedate       Family History   Problem Relation Age of Onset    Diabetes Maternal Aunt     Cancer Maternal Uncle         throat    Cancer Maternal Grandmother         melanoma    Heart Disease Other     Heart Attack Paternal Grandmother 48    Hypertension Mother     Cancer Maternal Grandfather         Melanoma    Stroke Neg Hx        Social History     Tobacco Use    Smoking status: Former Smoker     Packs/day: 0.50     Years: 11.00     Pack years: 5.50     Quit date: 2003     Years since quittin.5    Smokeless tobacco: Never Used   Substance Use Topics    Alcohol use: Yes     Alcohol/week: 1.0 standard drinks     Types: 1 Glasses of wine per week     Comment: rarely- 3-4 drinks/yr    Drug use: No        Current Outpatient Medications   Medication Sig    labetaloL (NORMODYNE) 200 mg tablet TAKE 1 TABLET BY MOUTH TWICE DAILY    losartan (COZAAR) 100 mg tablet TAKE 1 TABLET BY MOUTH EVERY DAY    omeprazole-sodium bicarbonate (Zegerid) 20-1.1 mg-gram capsule Take 1 Cap by mouth daily.  rosuvastatin (CRESTOR) 10 mg tablet Take 1 Tab by mouth nightly.  famotidine (PEPCID) 20 mg tablet Take 20 mg by mouth two (2) times a day.  diclofenac (VOLTAREN) 1 % gel Apply 2 g to affected area every six (6) hours as needed for Pain (right mid-anterior ribs).  acetaminophen (TYLENOL) 325 mg tablet Take  by mouth every four (4) hours as needed for Pain.  furosemide (LASIX) 20 mg tablet TAKE 1 TABLET BY MOUTH DAILY AS NEEDED    cholecalciferol, VITAMIN D3, (VITAMIN D3) 5,000 unit tab tablet Take  by mouth daily.  raloxifene (EVISTA) 60 mg tablet TAKE 1 TABLET BY MOUTH EVERY MORNING     No current facility-administered medications for this visit. Past Surgical History:   Procedure Laterality Date    HX CARPAL TUNNEL RELEASE      HX GYN      lap. fibroid removal    HX HEART CATHETERIZATION   approx    normal coronaries per pt    HX MASTECTOMY Right 2017    RIGHT [facility]  performed by Luiz Stevens MD at [address] [place] [address] HX ORTHOPAEDIC      ORIF left wrist    HX SHOULDER ARTHROSCOPY Left        Diagnostic Studies: Old records reviewed and show as follows  EKG tracings reviewed by me today.  Nuc Stress  Conclusion:    1.  Normal perfusion scan.    2. Normal wall motion and ejection fraction.    3. Low risk scan. Visit Vitals  /70 (BP 1 Location: Left upper arm, BP Patient Position: Sitting, BP Cuff Size: Adult)   Pulse 72   Ht 5' 1\" (1.549 m)   Wt 79.4 kg (175 lb)   SpO2 96%   BMI 33.07 kg/m²       Ms. Lea Favre has a reminder for a \"due or due soon\" health maintenance. I have asked that she contact her primary care provider for follow-up on this health maintenance. Physical Exam  Constitutional:       General: She is not in acute distress. Appearance: She is well-developed. Comments: obese   HENT:      Head: Normocephalic and atraumatic. Mouth/Throat:      Dentition: Normal dentition. Eyes:      General: No scleral icterus. Right eye: No discharge. Left eye: No discharge. Neck:      Thyroid: No thyromegaly. Vascular: No carotid bruit or JVD. Cardiovascular:      Rate and Rhythm: Normal rate and regular rhythm. Pulses: Intact distal pulses. Heart sounds: Normal heart sounds, S1 normal and S2 normal. No murmur heard. No friction rub. No gallop. Pulmonary:      Effort: Pulmonary effort is normal.      Breath sounds: Normal breath sounds. No wheezing or rales. Abdominal:      Palpations: Abdomen is soft. There is no mass. Tenderness: There is no abdominal tenderness. Musculoskeletal:      Cervical back: Neck supple. Right lower leg: No edema. Left lower leg: No edema. Lymphadenopathy:      Cervical:      Right cervical: No superficial cervical adenopathy. Left cervical: No superficial cervical adenopathy. Skin:     General: Skin is warm and dry. Findings: No rash. Neurological:      Mental Status: She is alert and oriented to person, place, and time. Psychiatric:         Behavior: Behavior normal.         ASSESSMENT and PLAN    I have reviewed/discussed the above normal BMI with the patient.   I have recommended the following interventions: dietary management education, guidance, and counseling, encourage exercise and monitor weight . AAC CVD risk is calculated at 9.2% over 10 years. She has tried different statins and cannot tolerate. She is also try to lose weight. She tries to follow diet but is not very regular about her exercise. She has significant joint pains and history of breast cancer and takes Femara. Shweta Sheridan HLD : Results for Zackary Silva (MRN [id number]) as of 7/27/2020 10:25   Ref. Range 1/31/2019 08:52 5/23/2019 08:06   Triglyceride Latest Ref Range: <150 MG/ 134   Cholesterol, total Latest Ref Range: <200 MG/ (H) 186   HDL Cholesterol Latest Ref Range: 40 - 60 MG/DL 56 59   CHOL/HDL Ratio Latest Ref Range: 0 - 5.0   4.2 3.2   VLDL, calculated Latest Units: MG/DL 24.8 26.8   LDL, calculated Latest Ref Range: 0 - 100 MG/.2 (H) 100.2 (H)       NYHA2    Chest pain appears to be musculoskeletal. Confirmed by her oncologist and orthopedics. LDL has reduced well with Crestor once or twice a week. BP controlled well. Diet weight and exercise discussed. Diagnoses and all orders for this visit:    1. Chronic diastolic congestive heart failure ([address]    2. Essential hypertension  -     AMB POC EKG ROUTINE W/ 12 LEADS, INTER & REP  -     METABOLIC PANEL, BASIC; Future  -     LIPID PANEL; Future  -     HEPATIC FUNCTION PANEL; Future  -     CBC W/O DIFF; Future  -     TSH 3RD GENERATION; Future    3. Pure hypercholesterolemia  -     LIPID PANEL; Future    4. Obesity (BMI 30.0-34. 9)        Pertinent laboratory and test data reviewed and discussed with patient.   See patient instructions also for other medical advice given    Medications Discontinued During This Encounter   Medication Reason    abaloparatide (Tymlos) 80 mcg (3,120 mcg/1.56 mL) pnij Cost of Medication    letrozole (FEMARA) 2.5 mg tablet Not A Current Medication       Follow-up and Dispositions    · Return in about 1 year (around 7/26/2022), or if symptoms worsen or fail to improve, for with ekg, post test. 7/26/2021 BP controlled well. Lost a few pounds for which she was congratulated. Labs as ordered. Diet, weight and exercise discussed. Mediterranean diet guidelines printed.

## 2021-07-26 NOTE — PATIENT INSTRUCTIONS
Medications Discontinued During This Encounter   Medication Reason    abaloparatide (Tymlos) 80 mcg (3,120 mcg/1.56 mL) pnij Cost of Medication    letrozole (FEMARA) 2.5 mg tablet Not A Current Medication          Learning About the Mediterranean Diet  What is the Mediterranean diet? The Mediterranean diet is a style of eating rather than a diet plan. It features foods eaten in [place], [place], [place] and [place], and other countries along the . It emphasizes eating foods like fish, fruits, vegetables, beans, high-fiber breads and whole grains, nuts, and olive oil. This style of eating includes limited red meat, cheese, and sweets. Why choose the Mediterranean diet? A Mediterranean-style diet may improve heart health. It contains more fat than other heart-healthy diets. But the fats are mainly from nuts, unsaturated oils (such as fish oils and olive oil), and certain nut or seed oils (such as canola, soybean, or flaxseed oil). These fats may help protect the heart and blood vessels. How can you get started on the Mediterranean diet? Here are some things you can do to switch to a more Mediterranean way of eating. What to eat  · Eat a variety of fruits and vegetables each day, such as grapes, blueberries, tomatoes, broccoli, peppers, figs, olives, spinach, eggplant, beans, lentils, and chickpeas. · Eat a variety of whole-grain foods each day, such as oats, brown rice, and whole wheat bread, pasta, and couscous. · Eat fish at least 2 times a week. Try tuna, salmon, mackerel, lake trout, herring, or sardines. · Eat moderate amounts of low-fat dairy products, such as milk, cheese, or yogurt. · Eat moderate amounts of poultry and eggs. · Choose healthy (unsaturated) fats, such as nuts, olive oil, and certain nut or seed oils like canola, soybean, and flaxseed. · Limit unhealthy (saturated) fats, such as butter, palm oil, and coconut oil.  And limit fats found in animal products, such as meat and dairy products made with whole milk. Try to eat red meat only a few times a month in very small amounts. · Limit sweets and desserts to only a few times a week. This includes sugar-sweetened drinks like soda. The Mediterranean diet may also include red wine with your meal--1 glass each day for women and up to 2 glasses a day for men. Tips for eating at home  · Use herbs, spices, garlic, lemon zest, and citrus juice instead of salt to add flavor to foods. · Add avocado slices to your sandwich instead of quiros. · Have fish for lunch or dinner instead of red meat. Brush the fish with olive oil, and broil or grill it. · Sprinkle your salad with seeds or nuts instead of cheese. · Cook with olive or canola oil instead of butter or oils that are high in saturated fat. · Switch from 2% milk or whole milk to 1% or fat-free milk. · Dip raw vegetables in a vinaigrette dressing or hummus instead of dips made from mayonnaise or sour cream.  · Have a piece of fruit for dessert instead of a piece of cake. Try baked apples, or have some dried fruit. Tips for eating out  · Try broiled, grilled, baked, or poached fish instead of having it fried or breaded. · Ask your  to have your meals prepared with olive oil instead of butter. · Order dishes made with marinara sauce or sauces made from olive oil. Avoid sauces made from cream or mayonnaise. · Choose whole-grain breads, whole wheat pasta and pizza crust, brown rice, beans, and lentils. · Cut back on butter or margarine on bread. Instead, you can dip your bread in a small amount of olive oil. · Ask for a side salad or grilled vegetables instead of french fries or chips. Where can you learn more? Go to http://www.morgan.com/  Enter O407 in the search box to learn more about \"Learning About the Mediterranean Diet. \"  Current as of: December 17, 2020               Content Version: 12.8  © 4106-6833 Healthwise, [facility].    Care instructions adapted under license by [employer] (which disclaims liability or warranty for this information). If you have questions about a medical condition or this instruction, always ask your healthcare professional. [address] any warranty or liability for your use of this information.

## 2021-07-26 NOTE — PROGRESS NOTES
1. Have you been to the ER, urgent care clinic since your last visit? Hospitalized since your last visit? No    2. Have you seen or consulted any other health care providers outside of the [address] [place] [address] since your last visit? Include any pap smears or colon screening. Yes Where: Oncology Routine/ PCP Routine     3. Since your last visit, have you had any of the following symptoms?      swelling in legs/arms. 4.  Have you had any blood work, X-rays or cardiac testing? No    5. Where do you normally have your labs drawn? [address]    6. Do you need any refills today?    No

## 2021-11-12 ENCOUNTER — TRANSCRIBE ORDER (OUTPATIENT)
Dept: SCHEDULING | Age: 71
End: 2021-11-12

## 2021-11-12 DIAGNOSIS — C50.919 BREAST CANCER (HCC): ICD-10-CM

## 2021-11-12 DIAGNOSIS — R92.8 ABNORMAL MAMMOGRAM: Primary | ICD-10-CM

## 2021-12-09 DIAGNOSIS — I10 ESSENTIAL HYPERTENSION: ICD-10-CM

## 2021-12-09 RX ORDER — LABETALOL 200 MG/1
TABLET, FILM COATED ORAL
Qty: 180 TABLET | Refills: 2 | Status: SHIPPED | OUTPATIENT
Start: 2021-12-09 | End: 2022-05-06 | Stop reason: SDUPTHER

## 2021-12-20 ENCOUNTER — HOSPITAL ENCOUNTER (OUTPATIENT)
Dept: GENERAL RADIOLOGY | Age: 71
Discharge: HOME | End: 2021-12-20
Attending: PHYSICIAN ASSISTANT
Payer: MEDICARE

## 2021-12-20 ENCOUNTER — HOSPITAL ENCOUNTER (OUTPATIENT)
Dept: MAMMOGRAPHY | Age: 71
Discharge: HOME | End: 2021-12-20
Attending: PHYSICIAN ASSISTANT
Payer: MEDICARE

## 2021-12-20 ENCOUNTER — TELEPHONE (OUTPATIENT)
Dept: FAMILY MEDICINE CLINIC | Age: 71
End: 2021-12-20

## 2021-12-20 ENCOUNTER — HOSPITAL ENCOUNTER (OUTPATIENT)
Dept: ULTRASOUND IMAGING | Age: 71
Discharge: HOME | End: 2021-12-20
Attending: PHYSICIAN ASSISTANT
Payer: MEDICARE

## 2021-12-20 ENCOUNTER — OFFICE VISIT (OUTPATIENT)
Dept: FAMILY MEDICINE CLINIC | Age: 71
End: 2021-12-20
Payer: MEDICARE

## 2021-12-20 VITALS
HEIGHT: 61 IN | RESPIRATION RATE: 16 BRPM | WEIGHT: 181 LBS | BODY MASS INDEX: 34.17 KG/M2 | TEMPERATURE: 95.3 F | OXYGEN SATURATION: 97 % | DIASTOLIC BLOOD PRESSURE: 87 MMHG | HEART RATE: 77 BPM | SYSTOLIC BLOOD PRESSURE: 153 MMHG

## 2021-12-20 DIAGNOSIS — C50.919 BREAST CANCER (HCC): ICD-10-CM

## 2021-12-20 DIAGNOSIS — M25.551 HIP PAIN, ACUTE, RIGHT: ICD-10-CM

## 2021-12-20 DIAGNOSIS — Z13.29 SCREENING FOR THYROID DISORDER: ICD-10-CM

## 2021-12-20 DIAGNOSIS — I10 ESSENTIAL HYPERTENSION: ICD-10-CM

## 2021-12-20 DIAGNOSIS — R92.8 ABNORMAL MAMMOGRAM: ICD-10-CM

## 2021-12-20 DIAGNOSIS — M54.50 ACUTE MIDLINE LOW BACK PAIN WITHOUT SCIATICA: Primary | ICD-10-CM

## 2021-12-20 DIAGNOSIS — M54.50 ACUTE MIDLINE LOW BACK PAIN WITHOUT SCIATICA: ICD-10-CM

## 2021-12-20 DIAGNOSIS — E66.9 OBESITY (BMI 30.0-34.9): ICD-10-CM

## 2021-12-20 DIAGNOSIS — S32.10XA CLOSED FRACTURE OF SACRUM, UNSPECIFIED PORTION OF SACRUM, INITIAL ENCOUNTER (HCC): Primary | ICD-10-CM

## 2021-12-20 DIAGNOSIS — I10 ESSENTIAL HYPERTENSION: Primary | ICD-10-CM

## 2021-12-20 DIAGNOSIS — E78.00 PURE HYPERCHOLESTEROLEMIA: ICD-10-CM
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
## 2021-12-20 DIAGNOSIS — Z11.59 ENCOUNTER FOR HEPATITIS C SCREENING TEST FOR LOW RISK PATIENT: ICD-10-CM

## 2021-12-20 DIAGNOSIS — Z13.1 SCREENING FOR DIABETES MELLITUS: ICD-10-CM

## 2021-12-20 PROCEDURE — G8417 CALC BMI ABV UP PARAM F/U: HCPCS | Performed by: STUDENT IN AN ORGANIZED HEALTH CARE EDUCATION/TRAINING PROGRAM

## 2021-12-20 PROCEDURE — 3017F COLORECTAL CA SCREEN DOC REV: CPT | Performed by: STUDENT IN AN ORGANIZED HEALTH CARE EDUCATION/TRAINING PROGRAM

## 2021-12-20 PROCEDURE — 77065 DX MAMMO INCL CAD UNI: CPT

## 2021-12-20 PROCEDURE — 99213 OFFICE O/P EST LOW 20 MIN: CPT | Performed by: STUDENT IN AN ORGANIZED HEALTH CARE EDUCATION/TRAINING PROGRAM

## 2021-12-20 PROCEDURE — 73502 X-RAY EXAM HIP UNI 2-3 VIEWS: CPT

## 2021-12-20 PROCEDURE — 72220 X-RAY EXAM SACRUM TAILBONE: CPT

## 2021-12-20 PROCEDURE — 72100 X-RAY EXAM L-S SPINE 2/3 VWS: CPT

## 2021-12-20 PROCEDURE — G8399 PT W/DXA RESULTS DOCUMENT: HCPCS | Performed by: STUDENT IN AN ORGANIZED HEALTH CARE EDUCATION/TRAINING PROGRAM

## 2021-12-20 PROCEDURE — G8427 DOCREV CUR MEDS BY ELIG CLIN: HCPCS | Performed by: STUDENT IN AN ORGANIZED HEALTH CARE EDUCATION/TRAINING PROGRAM

## 2021-12-20 PROCEDURE — G8754 DIAS BP LESS 90: HCPCS | Performed by: STUDENT IN AN ORGANIZED HEALTH CARE EDUCATION/TRAINING PROGRAM

## 2021-12-20 PROCEDURE — 1090F PRES/ABSN URINE INCON ASSESS: CPT | Performed by: STUDENT IN AN ORGANIZED HEALTH CARE EDUCATION/TRAINING PROGRAM

## 2021-12-20 PROCEDURE — G8536 NO DOC ELDER MAL SCRN: HCPCS | Performed by: STUDENT IN AN ORGANIZED HEALTH CARE EDUCATION/TRAINING PROGRAM

## 2021-12-20 PROCEDURE — G8753 SYS BP > OR = 140: HCPCS | Performed by: STUDENT IN AN ORGANIZED HEALTH CARE EDUCATION/TRAINING PROGRAM

## 2021-12-20 PROCEDURE — G8510 SCR DEP NEG, NO PLAN REQD: HCPCS | Performed by: STUDENT IN AN ORGANIZED HEALTH CARE EDUCATION/TRAINING PROGRAM

## 2021-12-20 PROCEDURE — G9899 SCRN MAM PERF RSLTS DOC: HCPCS | Performed by: STUDENT IN AN ORGANIZED HEALTH CARE EDUCATION/TRAINING PROGRAM

## 2021-12-20 PROCEDURE — 1101F PT FALLS ASSESS-DOCD LE1/YR: CPT | Performed by: STUDENT IN AN ORGANIZED HEALTH CARE EDUCATION/TRAINING PROGRAM

## 2021-12-20 RX ORDER — LOSARTAN POTASSIUM 100 MG/1
TABLET ORAL
Qty: 90 TABLET | Refills: 3 | Status: SHIPPED | OUTPATIENT
Start: 2021-12-20
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
## 2022-02-02 ENCOUNTER — OFFICE VISIT (OUTPATIENT)
Dept: ORTHOPEDIC SURGERY | Age: 72
End: 2022-02-02
Payer: MEDICARE

## 2022-02-02 VITALS
TEMPERATURE: 98.2 F | HEART RATE: 75 BPM | BODY MASS INDEX: 34.17 KG/M2 | WEIGHT: 181 LBS | OXYGEN SATURATION: 98 % | HEIGHT: 61 IN

## 2022-02-02 DIAGNOSIS — M80.00XA AGE-RELATED OSTEOPOROSIS WITH CURRENT PATHOLOGICAL FRACTURE, INITIAL ENCOUNTER: ICD-10-CM

## 2022-02-02 DIAGNOSIS — S38.1XXA CRUSHING INJURY OF ABDOMEN, LOWER BACK, AND PELVIS, INITIAL ENCOUNTER: ICD-10-CM

## 2022-02-02 DIAGNOSIS — M53.3 SACRAL PAIN: Primary | ICD-10-CM

## 2022-02-02 DIAGNOSIS — M43.16 SPONDYLOLISTHESIS OF LUMBAR REGION: ICD-10-CM

## 2022-02-02 DIAGNOSIS — M53.3 SACRAL PAIN: ICD-10-CM

## 2022-02-02 PROBLEM — M80.00XG AGE-RELATED OSTEOPOROSIS WITH CURRENT PATHOLOGICAL FRACTURE WITH DELAYED HEALING: Status: ACTIVE | Noted: 2022-02-02

## 2022-02-02 PROCEDURE — G8756 NO BP MEASURE DOC: HCPCS | Performed by: PHYSICAL MEDICINE & REHABILITATION

## 2022-02-02 PROCEDURE — G8417 CALC BMI ABV UP PARAM F/U: HCPCS | Performed by: PHYSICAL MEDICINE & REHABILITATION

## 2022-02-02 PROCEDURE — 1101F PT FALLS ASSESS-DOCD LE1/YR: CPT | Performed by: PHYSICAL MEDICINE & REHABILITATION

## 2022-02-02 PROCEDURE — G8427 DOCREV CUR MEDS BY ELIG CLIN: HCPCS | Performed by: PHYSICAL MEDICINE & REHABILITATION

## 2022-02-02 PROCEDURE — G8536 NO DOC ELDER MAL SCRN: HCPCS | Performed by: PHYSICAL MEDICINE & REHABILITATION

## 2022-02-02 PROCEDURE — 99203 OFFICE O/P NEW LOW 30 MIN: CPT | Performed by: PHYSICAL MEDICINE & REHABILITATION

## 2022-02-02 PROCEDURE — 1090F PRES/ABSN URINE INCON ASSESS: CPT | Performed by: PHYSICAL MEDICINE & REHABILITATION

## 2022-02-02 PROCEDURE — G8432 DEP SCR NOT DOC, RNG: HCPCS | Performed by: PHYSICAL MEDICINE & REHABILITATION

## 2022-02-02 PROCEDURE — 3017F COLORECTAL CA SCREEN DOC REV: CPT | Performed by: PHYSICAL MEDICINE & REHABILITATION

## 2022-02-02 PROCEDURE — G9899 SCRN MAM PERF RSLTS DOC: HCPCS | Performed by: PHYSICAL MEDICINE & REHABILITATION

## 2022-02-02 NOTE — LETTER
2/2/2022    Patient: Berto Lombardo   YOB: 1950   Date of Visit: 2/2/2022     Juhi Rodriguez NP  [address] [place] [address]  [address] [facility]  [address] [address]  [address] [place]    Dear ROBERTA Morton MD,      Thank you for referring Ms. Antoinette Almaraz to [facility] for evaluation. My notes for this consultation are attached. If you have questions, please do not hesitate to call me. I look forward to following your patient along with you.       Sincerely,    Josephine Vegas MD

## 2022-02-02 NOTE — PROGRESS NOTES
Betsy Serna presents today for   Chief Complaint   Patient presents with    Hip Pain    Back Pain       Is someone accompanying this pt? no    Is the patient using any DME equipment during OV? no    Depression Screening:  3 most recent PHQ Screens 12/20/2021   Little interest or pleasure in doing things Not at all   Feeling down, depressed, irritable, or hopeless Not at all   Total Score PHQ 2 0       Learning Assessment:  Learning Assessment 11/3/2017   PRIMARY LEARNER Patient   BARRIERS PRIMARY LEARNER NONE   PRIMARY LANGUAGE ENGLISH   LEARNER PREFERENCE PRIMARY READING     DEMONSTRATION     LISTENING   ANSWERED BY self   RELATIONSHIP SELF       Abuse Screening:  Abuse Screening Questionnaire 12/1/2020   Do you ever feel afraid of your partner? N   Are you in a relationship with someone who physically or mentally threatens you? N   Is it safe for you to go home? Y       Fall Risk  Fall Risk Assessment, last 12 mths 12/20/2021   Able to walk? Yes   Fall in past 12 months? 0   Do you feel unsteady? 0   Are you worried about falling 0       OPIOID RISK TOOL  No flowsheet data found. Coordination of Care:  1. Have you been to the ER, urgent care clinic since your last visit? no  Hospitalized since your last visit? no    2. Have you seen or consulted any other health care providers outside of the [address] [place] [address] since your last visit? no Include any pap smears or colon screening.  no
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
## 2022-02-17 ENCOUNTER — HOSPITAL ENCOUNTER (OUTPATIENT)
Dept: CT IMAGING | Age: 72
Discharge: HOME | End: 2022-02-17
Attending: PHYSICAL MEDICINE & REHABILITATION
Payer: MEDICARE

## 2022-02-17 PROCEDURE — 72192 CT PELVIS W/O DYE: CPT

## 2022-02-21 ENCOUNTER — DOCUMENTATION ONLY (OUTPATIENT)
Dept: ORTHOPEDIC SURGERY | Age: 72
End: 2022-02-21

## 2022-02-21 DIAGNOSIS — S38.1XXA CRUSHING INJURY OF ABDOMEN, LOWER BACK, AND PELVIS, INITIAL ENCOUNTER: ICD-10-CM

## 2022-02-21 DIAGNOSIS — M43.16 SPONDYLOLISTHESIS OF LUMBAR REGION: ICD-10-CM

## 2022-02-21 DIAGNOSIS — M53.3 SACRAL PAIN: Primary | ICD-10-CM

## 2022-02-21 NOTE — PROGRESS NOTES
Patient returned the disconnected call. I verified her name and date of birth. I informed patient of the providers message for the results of her CT Scan that there were no fractures. And she can start physical therapy. Patient verbalized understanding and I inquired where would she like to go for physical therapy. Patient stated the [facility] [employer] [facility]. I informed her that an order will be put in and they will contact her for an appointment. Patient inquired if she should keep her appointment on 3/17/22. I informed her yes to see the status of the physical therapy.

## 2022-02-21 NOTE — PROGRESS NOTES
Physical therapy referral entered per provider's verbal order. It will be sent electronically to the [facility] requested by the pt. She is aware that they will contact her to schedule.

## 2022-02-21 NOTE — PROGRESS NOTES
Pt called the office right back. I went to tell her the results of her CT scan and then the phone went silent. I said \"hello\" a couple of times and was never able to hear anything. The call was then disconnected.

## 2022-02-21 NOTE — PROGRESS NOTES
Attempted to contact the pt about her results. She was not able to be reached. A message was left for the pt asking her to contact the office regarding some results. The number to the office was provided.

## 2022-02-23 ENCOUNTER — OFFICE VISIT (OUTPATIENT)
Dept: FAMILY MEDICINE CLINIC | Age: 72
End: 2022-02-23
Payer: MEDICARE

## 2022-02-23 VITALS
HEIGHT: 60 IN | WEIGHT: 182 LBS | DIASTOLIC BLOOD PRESSURE: 67 MMHG | SYSTOLIC BLOOD PRESSURE: 120 MMHG | OXYGEN SATURATION: 95 % | TEMPERATURE: 96.8 F | BODY MASS INDEX: 35.73 KG/M2 | RESPIRATION RATE: 16 BRPM | HEART RATE: 80 BPM

## 2022-02-23 DIAGNOSIS — E66.01 SEVERE OBESITY (BMI 35.0-39.9) WITH COMORBIDITY (HCC): ICD-10-CM

## 2022-02-23 DIAGNOSIS — I10 ESSENTIAL HYPERTENSION: ICD-10-CM

## 2022-02-23 DIAGNOSIS — Z00.00 MEDICARE ANNUAL WELLNESS VISIT, SUBSEQUENT: Primary | ICD-10-CM

## 2022-02-23 DIAGNOSIS — Z23 ENCOUNTER FOR IMMUNIZATION: ICD-10-CM

## 2022-02-23 DIAGNOSIS — E78.2 MIXED HYPERLIPIDEMIA: ICD-10-CM

## 2022-02-23 PROCEDURE — G8752 SYS BP LESS 140: HCPCS | Performed by: NURSE PRACTITIONER

## 2022-02-23 PROCEDURE — G0009 ADMIN PNEUMOCOCCAL VACCINE: HCPCS | Performed by: NURSE PRACTITIONER

## 2022-02-23 PROCEDURE — G8432 DEP SCR NOT DOC, RNG: HCPCS | Performed by: NURSE PRACTITIONER

## 2022-02-23 PROCEDURE — G8427 DOCREV CUR MEDS BY ELIG CLIN: HCPCS | Performed by: NURSE PRACTITIONER

## 2022-02-23 PROCEDURE — 1101F PT FALLS ASSESS-DOCD LE1/YR: CPT | Performed by: NURSE PRACTITIONER

## 2022-02-23 PROCEDURE — G9899 SCRN MAM PERF RSLTS DOC: HCPCS | Performed by: NURSE PRACTITIONER

## 2022-02-23 PROCEDURE — G8536 NO DOC ELDER MAL SCRN: HCPCS | Performed by: NURSE PRACTITIONER

## 2022-02-23 PROCEDURE — 3017F COLORECTAL CA SCREEN DOC REV: CPT | Performed by: NURSE PRACTITIONER

## 2022-02-23 PROCEDURE — G8417 CALC BMI ABV UP PARAM F/U: HCPCS | Performed by: NURSE PRACTITIONER

## 2022-02-23 PROCEDURE — G8754 DIAS BP LESS 90: HCPCS | Performed by: NURSE PRACTITIONER

## 2022-02-23 PROCEDURE — G0439 PPPS, SUBSEQ VISIT: HCPCS | Performed by: NURSE PRACTITIONER

## 2022-02-23 PROCEDURE — 90732 PPSV23 VACC 2 YRS+ SUBQ/IM: CPT | Performed by: NURSE PRACTITIONER

## 2022-02-23 NOTE — PROGRESS NOTES
Homa Her is a 80 y.o. female presenting today for Annual Wellness Visit and Hypertension  . Chief Complaint   Patient presents with    Annual Wellness Visit    Hypertension       HPI:  Homa Her presents to the office today for Annual wellness, hypertension and hyperlipdemia follow-up care. Hypertension-  BP stable. Compliant with the treatment plan. Negative for chest pain, palpiation, headache or dizziness. Negative for side effects to the medication     HLD-prescribed rosuvastatin daily. No recent lipid panel in the medical record. Denies any myalgia. Patient is compliant with the treatment plan    Review of Systems   Constitutional: Negative for malaise/fatigue. Respiratory: Negative for cough and shortness of breath. Cardiovascular: Negative for chest pain, palpitations and leg swelling. Gastrointestinal: Negative for abdominal pain, nausea and vomiting. Genitourinary: Negative for dysuria. Musculoskeletal: Negative for back pain and myalgias. Neurological: Negative for dizziness and headaches.        Allergies   Allergen Reactions    Lipitor [Atorvastatin] Myalgia     Quit 2/17    Nsaids (Non-Steroidal Anti-Inflammatory Drug) Other (comments)     Severe abdominal pain    Pcn [Penicillins] Swelling     Swelling, hives & photosensitive rxn    Statins-Hmg-Coa Reductase Inhibitors Other (comments)     Lag crumps       PHQ Screening   3 most recent PHQ Screens 2/23/2022   Little interest or pleasure in doing things Not at all   Feeling down, depressed, irritable, or hopeless Not at all   Total Score PHQ 2 0   Trouble falling or staying asleep, or sleeping too much Not at all   Feeling tired or having little energy Not at all   Poor appetite, weight loss, or overeating Not at all   Feeling bad about yourself - or that you are a failure or have let yourself or your family down Not at all   Trouble concentrating on things such as school, work, reading, or watching TV Not at all Moving or speaking so slowly that other people could have noticed; or the opposite being so fidgety that others notice Not at all   Thoughts of being better off dead, or hurting yourself in some way Not at all   PHQ 9 Score 0   How difficult have these problems made it for you to do your work, take care of your home and get along with others Not difficult at all       History  Past Medical History:   Diagnosis Date    Acute reaction to stress     Breast cancer ([facility] [address]     right breast cancer    Closed fracture of left wrist     Diastolic dysfunction     Fatty liver     GERD (gastroesophageal reflux disease)     Hyperlipidemia     Hypertension     diastolic dysfunction    Hypoglycemia     Kidney stones      for yrs; spon passage always so far    Radiation therapy complication     RSD (reflex sympathetic dystrophy) 2009    no blood pressure or sticks in left arm    Syncope     no recurrence since; was orthostatic at that time    Unspecified adverse effect of anesthesia     hard to sedate       Past Surgical History:   Procedure Laterality Date    HX CARPAL TUNNEL RELEASE      HX GYN      lap. fibroid removal    HX HEART CATHETERIZATION   approx    normal coronaries per pt    HX MASTECTOMY Right 2017    RIGHT BREAST LUMPECTOMY WITH NEEDLE LOCALIZATION Sherry Colón LYMPH NODE BIOPSY  performed by Yusef Mackay MD at [address] [place] [address] HX ORTHOPAEDIC      ORIF left wrist    HX SHOULDER ARTHROSCOPY Left        Social History     Socioeconomic History    Marital status:      Spouse name: Not on file    Number of children: Not on file    Years of education: Not on file    Highest education level: Not on file   Occupational History    Not on file   Tobacco Use    Smoking status: Former Smoker     Packs/day: 0.50     Years: 11.00     Pack years: 5.50     Quit date: 2003     Years since quittin.2    Smokeless tobacco: Never Used   Substance and Sexual Activity    Alcohol use: Yes     Alcohol/week: 1.0 standard drink     Types: 1 Glasses of wine per week     Comment: rarely- 3-4 drinks/yr    Drug use: No    Sexual activity: Not Currently   Other Topics Concern    Not on file   Social History Narrative    Not on file     Social Determinants of Health     Financial Resource Strain:     Difficulty of Paying Living Expenses: Not on file   Food Insecurity:     Worried About Running Out of Food in the Last Year: Not on file    Ramirez of Food in the Last Year: Not on file   Transportation Needs:     Lack of Transportation (Medical): Not on file    Lack of Transportation (Non-Medical): Not on file   Physical Activity:     Days of Exercise per Week: Not on file    Minutes of Exercise per Session: Not on file   Stress:     Feeling of Stress : Not on file   Social Connections:     Frequency of Communication with Friends and Family: Not on file    Frequency of Social Gatherings with Friends and Family: Not on file    Attends Orthodoxy Services: Not on file    Active Member of [address] [place] [address] or Organizations: Not on file    Attends Club or Organization Meetings: Not on file    Marital Status: Not on file   Intimate Partner Violence:     Fear of Current or Ex-Partner: Not on file    Emotionally Abused: Not on file    Physically Abused: Not on file    Sexually Abused: Not on file   Housing Stability:     Unable to Pay for Housing in the Last Year: Not on file    Number of [place] in the Last Year: Not on file    Unstable Housing in the Last Year: Not on file       Current Outpatient Medications   Medication Sig Dispense Refill    losartan (COZAAR) 100 mg tablet TAKE 1 TABLET BY MOUTH EVERY DAY 90 Tablet 3    labetaloL (NORMODYNE) 200 mg tablet TAKE 1 TABLET BY MOUTH TWICE DAILY 180 Tablet 2    raloxifene (EVISTA) 60 mg tablet TAKE 1 TABLET BY MOUTH EVERY MORNING      omeprazole-sodium bicarbonate (Zegerid) 20-1.1 mg-gram capsule Take 1 Cap by mouth daily.       rosuvastatin (CRESTOR) 10 mg tablet Take 1 Tab by mouth nightly. 90 Tab 1    famotidine (PEPCID) 20 mg tablet Take 20 mg by mouth two (2) times a day.  acetaminophen (TYLENOL) 325 mg tablet Take  by mouth every four (4) hours as needed for Pain.  furosemide (LASIX) 20 mg tablet TAKE 1 TABLET BY MOUTH DAILY AS NEEDED 30 Tab 0    cholecalciferol, VITAMIN D3, (VITAMIN D3) 5,000 unit tab tablet Take  by mouth daily.  diclofenac (VOLTAREN) 1 % gel Apply 2 g to affected area every six (6) hours as needed for Pain (right mid-anterior ribs). (Patient not taking: Reported on 2/2/2022) 200 g 1         Vitals:    02/23/22 0950   BP: 120/67   Pulse: 80   Resp: 16   Temp: 96.8 °F (36 °C)   TempSrc: Oral   SpO2: 95%   Weight: 182 lb (82.6 kg)   Height: 5' (1.524 m)   PainSc:   0 - No pain       Physical Exam  Vitals and nursing note reviewed. Constitutional:       Appearance: Normal appearance. HENT:      Head: Normocephalic. Cardiovascular:      Rate and Rhythm: Normal rate and regular rhythm. Pulses: Normal pulses. Heart sounds: Normal heart sounds. Pulmonary:      Effort: Pulmonary effort is normal.      Breath sounds: Normal breath sounds. Abdominal:      General: Bowel sounds are normal.      Palpations: Abdomen is soft. Skin:     General: Skin is warm and dry. Neurological:      General: No focal deficit present. Mental Status: She is alert. No visits with results within 3 Month(s) from this visit. Latest known visit with results is:   Hospital Outpatient Visit on 12/01/2020   Component Date Value Ref Range Status    SARS-CoV-2 12/01/2020 Not Detected  Not Detected   Final    Comment: (NOTE)  This nucleic acid amplification test was developed and its  performance characteristics determined by [facility]. Nucleic acid amplification tests include PCR and TMA. This test has  not been FDA cleared or approved.  This test has been authorized by  FDA under an Emergency Use Authorization (EUA). This test is only  authorized for the duration of time the declaration that  circumstances exist justifying the authorization of the emergency use  of in vitro diagnostic tests for detection of SARS-CoV-2 virus and/or  diagnosis of COVID-19 infection under section 564(b)(1) of the Act,  21 U. S.C. 896WBX-3(T) (1), unless the authorization is terminated or  revoked sooner. When diagnostic testing is negative, the possibility of a false  negative result should be considered in the context of a patient's  recent exposures and the presence of clinical signs and symptoms  consistent with COVID-19. An individual without symptoms of COVID-  19 and who is not shedding SARS-CoV-2 vi                           briana would expect to have a  negative (not detected) result in this assay. Performed At: [address] [id number]  Mary Jimenez MD NF:[id number]         No results found for any visits on 02/23/22. Patient Care Team:  Patient Care Team:  Zack Murillo NP as PCP - General (Nurse Practitioner)  Zack Murillo NP as PCP - [facility]  Apple Yee MD as Physician (Urology)      Assessment / Plan:      ICD-10-CM ICD-9-CM    1. Medicare annual wellness visit, subsequent  Z00.00 V70.0    2. Encounter for immunization  Z23 V03.89 PNEUMOCOCCAL POLYSACCHARIDE VACCINE, 23-VALENT, ADULT OR IMMUNOSUPPRESSED PT DOSE,   3. Essential hypertension  I10 401.9    4. Severe obesity (BMI 35.0-39. 9) with comorbidity ([address]  E66.01 278.01    5. Mixed hyperlipidemia  E78.2 272.2              I asked the patient if she  had any questions and answered her  questions. The patient stated that she understands the treatment plan and agrees with the treatment plan    This document was created with a voice activated dictation system and may contain transcription errors.

## 2022-02-23 NOTE — PROGRESS NOTES
Judd Cummings is a 80 y.o. female who presents for routine immunizations. She denies any symptoms , reactions or allergies that would exclude them from being immunized today. Risks and adverse reactions were discussed and the VIS was given to them. All questions were addressed. She refused to stay for observation. There were no reaction (s) observed when patient left clinic.

## 2022-02-23 NOTE — PROGRESS NOTES
This is the Subsequent Medicare Annual Wellness Exam, performed 12 months or more after the Initial AWV or the last Subsequent AWV    I have reviewed the patient's medical history in detail and updated the computerized patient record. Assessment/Plan   Education and counseling provided:  Are appropriate based on today's review and evaluation    1. Medicare annual wellness visit, subsequent       Depression Risk Factor Screening     3 most recent PHQ Screens 12/20/2021   Little interest or pleasure in doing things Not at all   Feeling down, depressed, irritable, or hopeless Not at all   Total Score PHQ 2 0       Alcohol & Drug Abuse Risk Screen    Do you average more than 1 drink per night or more than 7 drinks a week:  No    On any one occasion in the past three months have you have had more than 3 drinks containing alcohol:  No          Functional Ability and Level of Safety    Hearing: Hearing is good. Activities of Daily Living: The home contains: no safety equipment. Patient does total self care      Ambulation: with no difficulty     Fall Risk:  Fall Risk Assessment, last 12 mths 12/20/2021   Able to walk? Yes   Fall in past 12 months? 0   Do you feel unsteady?  0   Are you worried about falling 0      Abuse Screen:  Patient is not abused       Cognitive Screening    Has your family/caregiver stated any concerns about your memory: no     Cognitive Screening: Normal - Animal Naming Test    Health Maintenance Due     Health Maintenance Due   Topic Date Due    Hepatitis C Screening  Never done    DTaP/Tdap/Td series (1 - Tdap) Never done    Shingrix Vaccine Age 50> (1 of 2) Never done    Pneumococcal 65+ years (1 of 1 - PPSV23) Never done    Lipid Screen  05/23/2020    Flu Vaccine (1) 09/01/2021       Patient Care Team   Patient Care Team:  Eliazar Romero NP as PCP - General (Nurse Practitioner)  Eliazar Romero NP [facility]CP - [facility] Empaneled Provider  Sonja Monson MD as Physician (Urology)    History     Patient Active Problem List   Diagnosis Code    Rogers's esophagus K22.70    Other dysphagia R13.19    Acute reaction to stress F43.0    Essential hypertension I10    Pure hypercholesterolemia E78.00    Obesity (BMI 30.0-34. 9) E66.9    Acute diastolic CHF (congestive heart failure) (HCC) I50.31    Chronic diastolic congestive heart failure (HCC) I50.32    Personal history of breast cancer Z85.3    Breast cancer of upper-outer quadrant of right female breast ([facility] [address] C50.411    Age-related osteoporosis with current pathological fracture with delayed healing M80.00XG     Past Medical History:   Diagnosis Date    Acute reaction to stress     Breast cancer ([facility] [address]     right breast cancer    Closed fracture of left wrist     Diastolic dysfunction     Fatty liver     GERD (gastroesophageal reflux disease)     Hyperlipidemia     Hypertension     diastolic dysfunction    Hypoglycemia     Kidney stones     11/16 for yrs; spon passage always so far    Radiation therapy complication     RSD (reflex sympathetic dystrophy) 8/2009    no blood pressure or sticks in left arm    Syncope 2000    no recurrence since; was orthostatic at that time    Unspecified adverse effect of anesthesia     hard to sedate      Past Surgical History:   Procedure Laterality Date    HX CARPAL TUNNEL RELEASE      HX GYN      lap. fibroid removal    HX HEART CATHETERIZATION  2005 approx    normal coronaries per pt    HX MASTECTOMY Right 11/30/2017    RIGHT BREAST LUMPECTOMY WITH NEEDLE LOCALIZATION Karen Landeros LYMPH NODE BIOPSY  performed by Andrea Dixon MD at [facility] MAIN OR    HX ORTHOPAEDIC      ORIF left wrist    HX SHOULDER ARTHROSCOPY Left 1988     Current Outpatient Medications   Medication Sig Dispense Refill    losartan (COZAAR) 100 mg tablet TAKE 1 TABLET BY MOUTH EVERY DAY 90 Tablet 3    labetaloL (NORMODYNE) 200 mg tablet TAKE 1 TABLET BY MOUTH TWICE DAILY 180 Tablet 2    raloxifene (EVISTA) 60 mg tablet TAKE 1 TABLET BY MOUTH EVERY MORNING      omeprazole-sodium bicarbonate (Zegerid) 20-1.1 mg-gram capsule Take 1 Cap by mouth daily.  rosuvastatin (CRESTOR) 10 mg tablet Take 1 Tab by mouth nightly. 90 Tab 1    famotidine (PEPCID) 20 mg tablet Take 20 mg by mouth two (2) times a day.  diclofenac (VOLTAREN) 1 % gel Apply 2 g to affected area every six (6) hours as needed for Pain (right mid-anterior ribs). (Patient not taking: Reported on 2022) 200 g 1    acetaminophen (TYLENOL) 325 mg tablet Take  by mouth every four (4) hours as needed for Pain.  furosemide (LASIX) 20 mg tablet TAKE 1 TABLET BY MOUTH DAILY AS NEEDED 30 Tab 0    cholecalciferol, VITAMIN D3, (VITAMIN D3) 5,000 unit tab tablet Take  by mouth daily. Allergies   Allergen Reactions    Lipitor [Atorvastatin] Myalgia     Quit     Nsaids (Non-Steroidal Anti-Inflammatory Drug) Other (comments)     Severe abdominal pain    Pcn [Penicillins] Swelling     Swelling, hives & photosensitive rxn    Statins-Hmg-Coa Reductase Inhibitors Other (comments)     Lag crumps       Family History   Problem Relation Age of Onset    Diabetes Maternal Aunt     Cancer Maternal Uncle         throat    Cancer Maternal Grandmother         melanoma    Heart Disease Other     Heart Attack Paternal Grandmother 48    Hypertension Mother     Cancer Maternal Grandfather         Melanoma    Stroke Neg Hx      Social History     Tobacco Use    Smoking status: Former Smoker     Packs/day: 0.50     Years: 11.00     Pack years: 5.50     Quit date: 2003     Years since quittin.1    Smokeless tobacco: Never Used   Substance Use Topics    Alcohol use:  Yes     Alcohol/week: 1.0 standard drink     Types: 1 Glasses of wine per week     Comment: rarely- 3-4 drinks/yr         Denise Gordon

## 2022-02-23 NOTE — PATIENT INSTRUCTIONS
Medicare Wellness Visit, Female     The best way to live healthy is to have a lifestyle where you eat a well-balanced diet, exercise regularly, limit alcohol use, and quit all forms of tobacco/nicotine, if applicable. Regular preventive services are another way to keep healthy. Preventive services (vaccines, screening tests, monitoring & exams) can help personalize your care plan, which helps you manage your own care. Screening tests can find health problems at the earliest stages, when they are easiest to treat. [facility] follows the current, evidence-based guidelines published by the [facility] Gil Lubin ([facility]) when recommending preventive services for our patients. Because we follow these guidelines, sometimes recommendations change over time as research supports it. (For example, mammograms used to be recommended annually. Even though Medicare will still pay for an annual mammogram, the newer guidelines recommend a mammogram every two years for women of average risk). Of course, you and your doctor may decide to screen more often for some diseases, based on your risk and your co-morbidities (chronic disease you are already diagnosed with). Preventive services for you include:  - Medicare offers their members a free annual wellness visit, which is time for you and your primary care provider to discuss and plan for your preventive service needs. Take advantage of this benefit every year!  -All adults over the age of 72 should receive the recommended pneumonia vaccines. Current USPSTF guidelines recommend a series of two vaccines for the best pneumonia protection.   -All adults should have a flu vaccine yearly and a tetanus vaccine every 10 years.   -All adults age 48 and older should receive the shingles vaccines (series of two vaccines).       -All adults age 38-68 who are overweight should have a diabetes screening test once every three years.   -All adults born between [birth year] and 1964 should be screened once for Hepatitis C.  -Other screening tests and preventive services for persons with diabetes include: an eye exam to screen for diabetic retinopathy, a kidney function test, a foot exam, and stricter control over your cholesterol.   -Cardiovascular screening for adults with routine risk involves an electrocardiogram (ECG) at intervals determined by your doctor.   -Colorectal cancer screenings should be done for adults age 54-65 with no increased risk factors for colorectal cancer. There are a number of acceptable methods of screening for this type of cancer. Each test has its own benefits and drawbacks. Discuss with your doctor what is most appropriate for you during your annual wellness visit. The different tests include: colonoscopy (considered the best screening method), a fecal occult blood test, a fecal DNA test, and sigmoidoscopy.    -A bone mass density test is recommended when a woman turns 65 to screen for osteoporosis. This test is only recommended one time, as a screening. Some providers will use this same test as a disease monitoring tool if you already have osteoporosis. -Breast cancer screenings are recommended every other year for women of normal risk, age 54-69.  -Cervical cancer screenings for women over age 72 are only recommended with certain risk factors.      Here is a list of your current Health Maintenance items (your personalized list of preventive services) with a due date:  Health Maintenance Due   Topic Date Due    Hepatitis C Test  Never done    DTaP/Tdap/Td  (1 - Tdap) Never done    Shingles Vaccine (1 of 2) Never done    Pneumococcal Vaccine (1 of 1 - PPSV23) Never done    Cholesterol Test   05/23/2020    Yearly Flu Vaccine (1) 09/01/2021

## 2022-02-23 NOTE — PROGRESS NOTES
Aurora Skiff presents today for   Chief Complaint   Patient presents with    Annual Wellness Visit    Hypertension       Is someone accompanying this pt? no    Is the patient using any DME equipment during OV? no    Depression Screening:  3 most recent PHQ Screens 2/23/2022   Little interest or pleasure in doing things Not at all   Feeling down, depressed, irritable, or hopeless Not at all   Total Score PHQ 2 0   Trouble falling or staying asleep, or sleeping too much Not at all   Feeling tired or having little energy Not at all   Poor appetite, weight loss, or overeating Not at all   Feeling bad about yourself - or that you are a failure or have let yourself or your family down Not at all   Trouble concentrating on things such as school, work, reading, or watching TV Not at all   Moving or speaking so slowly that other people could have noticed; or the opposite being so fidgety that others notice Not at all   Thoughts of being better off dead, or hurting yourself in some way Not at all   PHQ 9 Score 0   How difficult have these problems made it for you to do your work, take care of your home and get along with others Not difficult at all       Learning Assessment:  Learning Assessment 11/3/2017   PRIMARY LEARNER Patient   BARRIERS PRIMARY LEARNER NONE   PRIMARY LANGUAGE ENGLISH   LEARNER PREFERENCE PRIMARY READING     DEMONSTRATION     LISTENING   ANSWERED BY self   RELATIONSHIP SELF       Abuse Screening:  Abuse Screening Questionnaire 12/1/2020   Do you ever feel afraid of your partner? N   Are you in a relationship with someone who physically or mentally threatens you? N   Is it safe for you to go home? Y       Fall Risk  Fall Risk Assessment, last 12 mths 2/23/2022   Able to walk? Yes   Fall in past 12 months? 0   Do you feel unsteady? 0   Are you worried about falling 0       Health Maintenance reviewed and discussed and ordered per Provider.     Health Maintenance Due   Topic Date Due    Hepatitis C Screening  Never done    DTaP/Tdap/Td series (1 - Tdap) Never done    Shingrix Vaccine Age 50> (1 of 2) Never done    Pneumococcal 65+ years (1 of 1 - PPSV23) Never done    Lipid Screen  05/23/2020    Flu Vaccine (1) 09/01/2021   . Coordination of Care:  1. Have you been to the ER, urgent care clinic since your last visit? Hospitalized since your last visit? no    2. Have you seen or consulted any other health care providers outside of the [address] [place] [address] since your last visit? Include any pap smears or colon screening.  no

## 2022-03-04 ENCOUNTER — HOSPITAL ENCOUNTER (OUTPATIENT)
Dept: PHYSICAL THERAPY | Age: 72
Discharge: HOME | End: 2022-03-04
Payer: MEDICARE

## 2022-03-04 PROCEDURE — 97110 THERAPEUTIC EXERCISES: CPT

## 2022-03-04 PROCEDURE — 97530 THERAPEUTIC ACTIVITIES: CPT

## 2022-03-04 PROCEDURE — 97162 PT EVAL MOD COMPLEX 30 MIN: CPT

## 2022-03-04 NOTE — PROGRESS NOTES
PT DAILY TREATMENT NOTE     Patient Name: Aurora Skiff  VQRH:  : 1950  [x]  Patient  Verified  Payor: VA MEDICARE / Plan: VA MEDICARE PART A & B / Product Type: Medicare /    In time:1202  Out time:1249  Total Treatment Time (min): 48  Visit #: 1 of     Medicare/BCBS Only   Total Timed Codes (min):  25 1:1 Treatment Time:  25       Treatment Area: Other low back pain [M54.59]    SUBJECTIVE  Pain Level (0-10 scale): 2/10  Any medication changes, allergies to medications, adverse drug reactions, diagnosis change, or new procedure performed?: [x] No    [] Yes (see summary sheet for update)  Subjective functional status/changes:   [] No changes reported  See eval    OBJECTIVE      15 min []Eval                  []Re-Eval       25 min Therapeutic Exercise:  [] See flow sheet :   Rationale: increase ROM, increase strength and improve coordination to improve the patients ability to ease with adl's            With   [] TE   [] TA   [] neuro   [] other: Patient Education: [x] Review HEP    [] Progressed/Changed HEP based on:   [] positioning   [] body mechanics   [] transfers   [] heat/ice application    [] other:      Other Objective/Functional Measures: Left upslip/ Right AI  Myofacial pull. Right on left rotation. L3-L5 rotated left  Pain Level (0-10 scale) post treatment: 0/10    ASSESSMENT/Changes in Function: see eval    Patient will continue to benefit from skilled PT services to modify and progress therapeutic interventions, address functional mobility deficits, address ROM deficits, address strength deficits, analyze and address soft tissue restrictions, analyze and cue movement patterns, analyze and modify body mechanics/ergonomics, assess and modify postural abnormalities and address imbalance/dizziness to attain remaining goals. [x]  See Plan of Care  []  See progress note/recertification  []  See Discharge Summary         Progress towards goals / Updated goals:  See poc. PLAN  [x]  Upgrade activities as tolerated     [x]  Continue plan of care  []  Update interventions per flow sheet       []  Discharge due to:_  []  Other:_      Wyona Severe, PT 3/4/2022  10:01 AM    Future Appointments   Date Time Provider Clifford Jensen   3/4/2022 12:00 PM Calli Muñoz, PT [facility]   3/17/2022 11:15 AM Anamika Hawthorne MD [facility] AMB   7/25/2022  9:00 AM Kelley Wong MD [facility] AMB
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
## 2022-03-08 ENCOUNTER — HOSPITAL ENCOUNTER (OUTPATIENT)
Dept: PHYSICAL THERAPY | Age: 72
Discharge: HOME | End: 2022-03-08
Payer: MEDICARE

## 2022-03-08 PROCEDURE — 97110 THERAPEUTIC EXERCISES: CPT

## 2022-03-08 NOTE — PROGRESS NOTES
PT DAILY TREATMENT NOTE     Patient Name: Kitty Perry  Date:3/8/2022  : 1950  [x]  Patient  Verified  Payor: VA MEDICARE / Plan: VA MEDICARE PART A & B / Product Type: Medicare /    In time:130  Out time:215  Total Treatment Time (min): 45  Visit #: 2 of     Medicare/BCBS Only   Total Timed Codes (min):  45 1:1 Treatment Time:  38       Treatment Area: Other low back pain [M54.59]    SUBJECTIVE  Pain Level (0-10 scale): 0/10  Any medication changes, allergies to medications, adverse drug reactions, diagnosis change, or new procedure performed?: [x] No    [] Yes (see summary sheet for update)  Subjective functional status/changes:   [] No changes reported  Pt stated that she has felt pretty good after last visit    OBJECTIVE    38 min Therapeutic Exercise:  [x] See flow sheet :   Rationale: increase ROM and increase strength to improve the patients ability to increase ease with ADLs    7 min Manual Therapy:  Pelvic alignment performed by ATC - NO CHARGE   The manual therapy interventions were performed at a separate and distinct time from the therapeutic activities interventions. Rationale: decrease pain, increase ROM and increase tissue extensibility to increase ease with ADLs    With   [x] TE   [] TA   [] neuro   [] other: Patient Education: [x] Review HEP    [] Progressed/Changed HEP based on:   [] positioning   [] body mechanics   [] transfers   [] heat/ice application    [] other:      Other Objective/Functional Measures:   Manual by ATC:   Leg pull to correct left upslip   Leg pull corrected right AI   MET and shot gun to correct left on left sacral rotation and L 3-5 left rotation  Had moderate difficulty getting into prone due to rib pain  No complaint of increased pain during session      Pain Level (0-10 scale) post treatment: 0/10    ASSESSMENT/Changes in Function:   Initiated therex today per flow sheet. Pt put forth good effort with all exercises.  Pt reported compliance with HEP    Patient will continue to benefit from skilled PT services to modify and progress therapeutic interventions, address functional mobility deficits, address ROM deficits, address strength deficits, analyze and cue movement patterns, analyze and modify body mechanics/ergonomics, assess and modify postural abnormalities, address imbalance/dizziness and instruct in home and community integration to attain remaining goals. [x]  See Plan of Care  []  See progress note/recertification  []  See Discharge Summary         Progress towards goals / Updated goals:  Short Term Goals: To be accomplished in 1 weeks:              1. Pt will be compliant with self massage to decrease IT band Pain and tenderness. Status at Eval: Moderate IT Band pain. Progressing. Reviewed technique today. 3/8/22                Long Term Goals: To be accomplished in 1 weeks:              1. Pt will increase FOTO score by   pts to improve function. Status at [facility]: FOTO=56                 2. Pt will report >60% improvement in sx since onset to ease with ADL's. Status at Eval: 0%                 3. Pt will report being able to sleep >4 hrs as she finds a POC with decreased sx. Status at Eval: Sleeps <4hrs                 4. Pt will be able to carry groceries \"not limited at all\" per FOTO to improve activity tolerance. Status at Eval:Limited a Little.     PLAN  []  Upgrade activities as tolerated     [x]  Continue plan of care  []  Update interventions per flow sheet       []  Discharge due to:_  []  Other:_      Aiden Bullock PTA 3/8/2022  6:49 AM    Future Appointments   Date Time Provider [facility]   3/8/2022  1:30 PM Priya Moore PTA [facility]   3/11/2022  9:00 AM Rexyeny Meansto [facility]   3/15/2022  9:00 AM Erik Bryant, PT [facility]   3/17/2022 11:15 AM [facility]   3/18/2022 11:15 AM Rexine Miguelangelto [facility]   3/22/2022 12:45 PM Reuben Spence PTA [facility] 3/24/2022  9:00 AM Jama Banerjee, PTA [facility]   3/29/2022  9:00 AM Jama Banerjee, PTA [facility]   4/1/2022  9:00 AM Jama Banerjee, PTA [facility]   7/25/2022  9:00 AM Pramod Reyes MD [facility] AMB

## 2022-03-11 ENCOUNTER — HOSPITAL ENCOUNTER (OUTPATIENT)
Dept: PHYSICAL THERAPY | Age: 72
Discharge: HOME | End: 2022-03-11
Payer: MEDICARE

## 2022-03-11 PROCEDURE — 97110 THERAPEUTIC EXERCISES: CPT

## 2022-03-11 NOTE — PROGRESS NOTES
PT DAILY TREATMENT NOTE     Patient Name: Tom Tamayo  ROM  : 1950  [x]  Patient  Verified  Payor: Maira Cash / Plan: VA MEDICARE PART A & B / Product Type: Medicare /    In time:910  Out time:942  Total Treatment Time (min): 32  Visit #: 3 of     Medicare/BCBS Only   Total Timed Codes (min):  32 1:1 Treatment Time:  32       Treatment Area: Other low back pain [M54.59]    SUBJECTIVE  Pain Level (0-10 scale): 0/10  Any medication changes, allergies to medications, adverse drug reactions, diagnosis change, or new procedure performed?: [x] No    [] Yes (see summary sheet for update)  Subjective functional status/changes:   [] No changes reported  Pt stated that she has no pain today, just achy    OBJECTIVE    32 min Therapeutic Exercise:  [x]? See flow sheet :   Rationale: increase ROM and increase strength to improve the patients ability to increase ease with ADLs     With   [x] TE   [] TA   [] neuro   [] other: Patient Education: [x] Review HEP    [] Progressed/Changed HEP based on:   [] positioning   [] body mechanics   [] transfers   [] heat/ice application    [] other:      Other Objective/Functional Measures:   Had slight difficulty with supine exercises reporting pain in the sacral area, pt declined pillow for increased support  No complaint of increased pain during session   Cont with decreased flexibility in B HS     Pain Level (0-10 scale) post treatment: 0/10    ASSESSMENT/Changes in Function:   Pt is making good progress toward goals. Pt had no pain today. Pt reports increased ease with sleep. Pt cont to report having a little difficulty with carrying heavy objects.      Patient will continue to benefit from skilled PT services to modify and progress therapeutic interventions, address functional mobility deficits, address ROM deficits, address strength deficits, analyze and address soft tissue restrictions, analyze and cue movement patterns, analyze and modify body mechanics/ergonomics, assess and modify postural abnormalities and instruct in home and community integration to attain remaining goals. [x]  See Plan of Care  []  See progress note/recertification  []  See Discharge Summary         Progress towards goals / Updated goals:  Short Term Goals: To be accomplished in 1 weeks:              1. Pt will be compliant with self massage to decrease IT band Pain and tenderness. Status at Eval: Moderate IT Band pain. Progressing. Reviewed technique today. 3/8/22                Long Term Goals: To be accomplished in [facility] [facility]:              1. Pt will increase FOTO score by   pts to improve function. Status at [facility]: FOTO=56                 2. Pt will report >60% improvement in sx since onset to ease with ADL's. Status at [facility]: 0%                 3. Pt will report being able to sleep >4 hrs as she finds a POC with decreased sx. Status at Eval: Sleeps <4hrs                 4. Pt will be able to carry groceries \"not limited at all\" per FOTO to improve activity tolerance.   Status at Eval:Limited a Little.     PLAN  []  Upgrade activities as tolerated     [x]  Continue plan of care  []  Update interventions per flow sheet       []  Discharge due to:_  []  Other:_      Diane Spencer, MATEUSZ 3/11/2022  6:36 AM    Future Appointments   Date Time Provider Clifford Rileyi   3/11/2022  9:00 AM Colleen Hector PTA [facility]   3/15/2022  9:00 AM Zari Hardy PT CHANELLE [facility]   3/17/2022 11:15 AM Susan Lizarraga MD [facility] AMB   3/18/2022 11:15 AM Larry Hanna [facility]   3/22/2022 12:45 PM Prasanth Vaca PTA [facility]   3/24/2022  9:00 AM Maksim Scott PTA [facility]   3/29/2022  9:00 AM Maksim Scott PTA [facility]   4/1/2022  9:00 AM Maksim Scott, PTA [facility]   7/25/2022  9:00 AM Flory Garibay MD [facility] BS AMB

## 2022-03-15 ENCOUNTER — HOSPITAL ENCOUNTER (OUTPATIENT)
Dept: PHYSICAL THERAPY | Age: 72
Discharge: HOME | End: 2022-03-15
Payer: MEDICARE

## 2022-03-15 PROCEDURE — 97110 THERAPEUTIC EXERCISES: CPT

## 2022-03-15 NOTE — PROGRESS NOTES
PT DAILY TREATMENT NOTE     Patient Name: Porfirio Blank  Date:3/15/2022  : 1950  [x]  Patient  Verified  Payor: Sixto Copeland / Plan: VA MEDICARE PART A & B / Product Type: Medicare /    In time: 9:11  Out time: 9:58  Total Treatment Time (min): 47  Visit #: 4 of     Medicare/BCBS Only   Total Timed Codes (min): 47 1:1 Treatment Time:  40       Treatment Area: Other low back pain [M54.59]    SUBJECTIVE  Pain Level (0-10 scale): 1/10  Any medication changes, allergies to medications, adverse drug reactions, diagnosis change, or new procedure performed?: [x] No    [] Yes (see summary sheet for update)  Subjective functional status/changes:   [] No changes reported  Patient stated that she walked the dog yesterday and she felt great, but then later evening she felt out of alignment later and had some discomfort. Patient stated she does some of the exercises when that happens and it seems to help. OBJECTIVE    47 min Therapeutic Exercise:  [x] See flow sheet :   Rationale: increase ROM and increase strength to improve the patients ability to perform ADLs safely. With   [] TE   [] TA   [] neuro   [] other: Patient Education: [x] Review HEP    [] Progressed/Changed HEP based on:   [] positioning   [] body mechanics   [] transfers   [] heat/ice application    [] other:      Other Objective/Functional Measures: Pelvic alignment assessed and was found WFL. Pain Level (0-10 scale) post treatment: 0.5/10     ASSESSMENT/Changes in Function: Therapist provided cues for correct technique and muscle activation with exercises. Patient was challenged with seated Hip ER on the right with Orange t-band, so modified to isometrics and patient stated that was better. Modified reps of hip 3-way to 2 sets of 5 reps to reduce discomfort and encourage maintaining good form. Patient is progressing toward LTG # 2. Patient reported decreased pain at end of the session.       Patient will continue to benefit from skilled PT services to modify and progress therapeutic interventions, address functional mobility deficits, address ROM deficits, address strength deficits, analyze and address soft tissue restrictions, analyze and cue movement patterns, analyze and modify body mechanics/ergonomics, assess and modify postural abnormalities and address imbalance/dizziness to attain remaining goals. []  See Plan of Care  []  See progress note/recertification  []  See Discharge Summary         Progress towards goals / Updated goals:  Short Term Goals: To be accomplished in 1 weeks:              1. Pt will be compliant with self massage to decrease IT band Pain and tenderness. Status at Eval: Moderate IT Band pain. Progressing. Reviewed technique today. 3/8/22                Long Term Goals: To be accomplished in [address] [facility]:              1. Pt will increase FOTO score by   pts to improve function. Status at [facility]: FOTO=56                 2. Pt will report >60% improvement in sx since onset to ease with ADL's. Status at [facility]: 0%   Current: Progressing: Patient reports 40% improvement in symptoms since [facility]. (3/15/22)               3. Pt will report being able to sleep >4 hrs as she finds a POC with decreased sx. Status at Eval: Sleeps <4hrs                 4. Pt will be able to carry groceries \"not limited at all\" per FOTO to improve activity tolerance.   Status at Eval:Limited a Little.     PLAN  []  Upgrade activities as tolerated     [x]  Continue plan of care  []  Update interventions per flow sheet       []  Discharge due to:_  []  Other:_      Muriel Infante, PT 3/15/2022  9:21 AM    Future Appointments   Date Time Provider Clifford Jensen   3/17/2022 11:15 AM Anamika Hawthorne MD [address] [place] [address]   3/18/2022 11:15 AM Jami Beatty [facility]   3/22/2022 12:45 PM Killian Edwards PTA [facility]   3/24/2022  9:00 AM Jaquelin Fulton PTA [facility]   3/29/2022  9:00 AM Jaquelin Fulton PTA [facility]   4/1/2022  9:00 AM Sarah Reza, PTA [facility]   7/25/2022  9:00 AM Janeen Potter MD [facility] BS AMB

## 2022-03-17 ENCOUNTER — OFFICE VISIT (OUTPATIENT)
Dept: ORTHOPEDIC SURGERY | Age: 72
End: 2022-03-17
Payer: MEDICARE

## 2022-03-17 VITALS
TEMPERATURE: 97.3 F | HEIGHT: 60 IN | HEART RATE: 79 BPM | BODY MASS INDEX: 35.73 KG/M2 | OXYGEN SATURATION: 97 % | WEIGHT: 182 LBS
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
## 2022-03-17 DIAGNOSIS — M53.3 SACROILIAC DYSFUNCTION: Primary | ICD-10-CM

## 2022-03-17 PROCEDURE — 99213 OFFICE O/P EST LOW 20 MIN: CPT | Performed by: PHYSICAL MEDICINE & REHABILITATION

## 2022-03-17 PROCEDURE — G8417 CALC BMI ABV UP PARAM F/U: HCPCS | Performed by: PHYSICAL MEDICINE & REHABILITATION

## 2022-03-17 PROCEDURE — 1101F PT FALLS ASSESS-DOCD LE1/YR: CPT | Performed by: PHYSICAL MEDICINE & REHABILITATION

## 2022-03-17 PROCEDURE — 1090F PRES/ABSN URINE INCON ASSESS: CPT | Performed by: PHYSICAL MEDICINE & REHABILITATION

## 2022-03-17 PROCEDURE — 3017F COLORECTAL CA SCREEN DOC REV: CPT | Performed by: PHYSICAL MEDICINE & REHABILITATION

## 2022-03-17 PROCEDURE — G9899 SCRN MAM PERF RSLTS DOC: HCPCS | Performed by: PHYSICAL MEDICINE & REHABILITATION

## 2022-03-17 PROCEDURE — G8536 NO DOC ELDER MAL SCRN: HCPCS | Performed by: PHYSICAL MEDICINE & REHABILITATION

## 2022-03-17 PROCEDURE — G8756 NO BP MEASURE DOC: HCPCS | Performed by: PHYSICAL MEDICINE & REHABILITATION

## 2022-03-17 PROCEDURE — G8427 DOCREV CUR MEDS BY ELIG CLIN: HCPCS | Performed by: PHYSICAL MEDICINE & REHABILITATION

## 2022-03-17 PROCEDURE — G8432 DEP SCR NOT DOC, RNG: HCPCS | Performed by: PHYSICAL MEDICINE & REHABILITATION

## 2022-03-17 RX ORDER — LIDOCAINE 50 MG/G
2 PATCH TOPICAL EVERY 24 HOURS
Qty: 60 EACH | Refills: 1 | Status: SHIPPED | OUTPATIENT
Start: 2022-03-17

## 2022-03-17 NOTE — PROGRESS NOTES
Elyse Leonardo presents today for   Chief Complaint   Patient presents with    Back Pain       Is someone accompanying this pt? no    Is the patient using any DME equipment during OV? no    Depression Screening:  3 most recent PHQ Screens 2/23/2022   Little interest or pleasure in doing things Not at all   Feeling down, depressed, irritable, or hopeless Not at all   Total Score PHQ 2 0   Trouble falling or staying asleep, or sleeping too much Not at all   Feeling tired or having little energy Not at all   Poor appetite, weight loss, or overeating Not at all   Feeling bad about yourself - or that you are a failure or have let yourself or your family down Not at all   Trouble concentrating on things such as school, work, reading, or watching TV Not at all   Moving or speaking so slowly that other people could have noticed; or the opposite being so fidgety that others notice Not at all   Thoughts of being better off dead, or hurting yourself in some way Not at all   PHQ 9 Score 0   How difficult have these problems made it for you to do your work, take care of your home and get along with others Not difficult at all       Learning Assessment:  Learning Assessment 11/3/2017   PRIMARY LEARNER Patient   BARRIERS PRIMARY LEARNER NONE   PRIMARY LANGUAGE ENGLISH   LEARNER PREFERENCE PRIMARY READING     DEMONSTRATION     LISTENING   ANSWERED BY self   RELATIONSHIP SELF       Abuse Screening:  Abuse Screening Questionnaire 12/1/2020   Do you ever feel afraid of your partner? N   Are you in a relationship with someone who physically or mentally threatens you? N   Is it safe for you to go home? Y       Fall Risk  Fall Risk Assessment, last 12 mths 2/23/2022   Able to walk? Yes   Fall in past 12 months? 0   Do you feel unsteady? 0   Are you worried about falling 0       OPIOID RISK TOOL  No flowsheet data found. Coordination of Care:  1.  Have you been to the ER, urgent care clinic since your last visit? no  Hospitalized since your last visit? no    2. Have you seen or consulted any other health care providers outside of the [address] [place] [address] since your last visit? no Include any pap smears or colon screening.  no
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
## 2022-03-17 NOTE — LETTER
3/18/2022    Patient: Tom Tamayo   YOB: 1950   Date of Visit: 3/17/2022     Sharron Dennis NP  [address] [place] [address] 15 [facility] 36    Dear Sharron Dennis NP,      Thank you for referring Ms. Katherine Gutiérrez to [facility] for evaluation. My notes for this consultation are attached. If you have questions, please do not hesitate to call me. I look forward to following your patient along with you.       Sincerely,    Sudeep Wright MD

## 2022-03-18 ENCOUNTER — HOSPITAL ENCOUNTER (OUTPATIENT)
Dept: PHYSICAL THERAPY | Age: 72
Discharge: HOME | End: 2022-03-18
Payer: MEDICARE

## 2022-03-18 PROCEDURE — 97110 THERAPEUTIC EXERCISES: CPT

## 2022-03-18 NOTE — PROGRESS NOTES
PT DAILY TREATMENT NOTE     Patient Name: Callie Glass  Date:3/18/2022  : 1950  [x]  Patient  Verified  Payor: Dipak Adrian / Plan: [place] MEDICARE PART A & B / Product Type: Medicare /    In time: 11:15 Out time: 12:02  Total Treatment Time (min): 47  Visit #: 5 of     Medicare/BCBS Only   Total Timed Codes (min): 34 1:1 Treatment Time: 34       Treatment Area: Other low back pain [M54.59]    SUBJECTIVE  Pain Level (0-10 scale): 1/10  Any medication changes, allergies to medications, adverse drug reactions, diagnosis change, or new procedure performed?: [x] No    [] Yes (see summary sheet for update)  Subjective functional status/changes:   [] No changes reported  Patient saw the doctor who said the CT showed no fractures and she doesn't feel there ever was a fracture, she thinks it is SIJ. The doctor said she could give her an injection but pt is not big on invasive procedures. OBJECTIVE    47/34 min Therapeutic Exercise:  [x] See flow sheet :   Rationale: increase ROM and increase strength to improve the patients ability to perform ADLs safely. With   [x] TE   [] TA   [] neuro   [] other: Patient Education: [x] Provided Initial HEP - pt did not have a copy  [] Progressed/Changed HEP based on:   [] positioning   [] body mechanics   [] transfers   [] heat/ice application    [x] other: trial of [place]ola Belt for SIJ (XL)     Other Objective/Functional Measures:  - positive response to trial of [employer] during session today  - provided initial HEP and OTB    Pain Level (0-10 scale) post treatment: 0/10     ASSESSMENT/Changes in Function:   Patient demonstrated improved tolerance to session with use of SI belt today. Min cuing for form with PPT. Provided patient with HEP and OTB as she did not have an initial one. Will progress as able while monitoring sx.     Patient will continue to benefit from skilled PT services to modify and progress therapeutic interventions, address functional mobility deficits, address ROM deficits, address strength deficits, analyze and address soft tissue restrictions, analyze and cue movement patterns, analyze and modify body mechanics/ergonomics, assess and modify postural abnormalities and address imbalance/dizziness to attain remaining goals. []  See Plan of Care  []  See progress note/recertification  []  See Discharge Summary         Progress towards goals / Updated goals:  Short Term Goals: To be accomplished in 1 weeks:              1. Pt will be compliant with self massage to decrease IT band Pain and tenderness. Status at Eval: Moderate IT Band pain. Progressing. Reviewed technique today. 3/8/22                Long Term Goals: To be accomplished in 1 weeks:  1. Pt will increase FOTO score by   pts to improve function. Status at Eval: FOTO=56     2. Pt will report >60% improvement in sx since onset to ease with ADL's. Status at Eval: 0%  Current: Progressing: Patient reports 40% improvement in symptoms since [facility]. (3/15/22)     3. Pt will report being able to sleep >4 hrs as she finds a POC with decreased sx. Status at Eval: Sleeps <4hrs       4. Pt will be able to carry groceries \"not limited at all\" per FOTO to improve activity tolerance.   Status at Eval:Limited a Little. Current: trial of SI belt today to improve ease with ADLs.  (3/1822)    PLAN  []  Upgrade activities as tolerated     [x]  Continue plan of care  []  Update interventions per flow sheet       []  Discharge due to:_  []  Other:_      Mariotravis Sandifer, [place] 3/18/2022  12:02 PM    Future Appointments   Date Time Provider Clifford Jensen   3/18/2022 11:15 AM Inga Maki [facility]   3/22/2022 12:45 PM Kaleigh Kirk PTA [facility]   3/24/2022  9:00 AM Dorotheaagh Number, PTA [facility]   3/29/2022  9:00 AM Shelagh Number, PTA [facility]   4/1/2022  9:00 AM Dorotheaagh Number, PTA [facility]   7/25/2022  9:00 AM Roman Fabian MD [facility] AMB

## 2022-03-19 PROBLEM — M80.00XG AGE-RELATED OSTEOPOROSIS WITH CURRENT PATHOLOGICAL FRACTURE WITH DELAYED HEALING: Status: ACTIVE | Noted: 2022-02-02

## 2022-03-19 PROBLEM — I50.32 CHRONIC DIASTOLIC CONGESTIVE HEART FAILURE (HCC): Status: ACTIVE | Noted: 2017-07-18

## 2022-03-19 PROBLEM — Z85.3 PERSONAL HISTORY OF BREAST CANCER: Status: ACTIVE | Noted: 2017-12-13

## 2022-03-19 PROBLEM — C50.411 BREAST CANCER OF UPPER-OUTER QUADRANT OF RIGHT FEMALE BREAST (HCC): Status: ACTIVE | Noted: 2018-01-29

## 2022-03-22 ENCOUNTER — HOSPITAL ENCOUNTER (OUTPATIENT)
Dept: PHYSICAL THERAPY | Age: 72
Discharge: HOME | End: 2022-03-22
Payer: MEDICARE

## 2022-03-22 PROCEDURE — 97112 NEUROMUSCULAR REEDUCATION: CPT

## 2022-03-22 PROCEDURE — 97110 THERAPEUTIC EXERCISES: CPT

## 2022-03-22 NOTE — PROGRESS NOTES
PT DAILY TREATMENT NOTE     Patient Name: Jimi Moya  Date:3/22/2022  : 1950  [x]  Patient  Verified  Payor: Marjan Salinas / Plan: [place] MEDICARE PART A & B / Product Type: Medicare /    In time:12:48  Out time:1:28  Total Treatment Time (min): 40  Visit #: 6 of     Medicare/BCBS Only   Total Timed Codes (min):  40 1:1 Treatment Time:  40       Treatment Area: Other low back pain [M54.59]    SUBJECTIVE  Pain Level (0-10 scale): 0  Any medication changes, allergies to medications, adverse drug reactions, diagnosis change, or new procedure performed?: [x] No    [] Yes (see summary sheet for update)  Subjective functional status/changes:   [] No changes reported  Pt states \"I've been wearing that belt and I think it's really helping\"    OBJECTIVE    32 min Therapeutic Exercise:  [x] See flow sheet :   Rationale: increase ROM and increase strength to improve the patients ability to perform ADLs    8 min Neuromuscular Re-education:  [x]  See flow sheet :   Rationale: increase strength, improve coordination, improve balance and increase proprioception  to improve the patients ability to perform functional tasks            With   [] TE   [] TA   [] neuro   [] other: Patient Education: [x] Review HEP    [] Progressed/Changed HEP based on:   [] positioning   [] body mechanics   [] transfers   [] heat/ice application    [] other:      Other Objective/Functional Measures: Added OTB KDO with TA    Pain Level (0-10 scale) post treatment: 0    ASSESSMENT/Changes in Function: Demo's improved postural awareness today and reports significant improvement while wearing SI belt. Pt was challenged well with therex and denies incr'd pain following treatment today.      Patient will continue to benefit from skilled PT services to modify and progress therapeutic interventions, address functional mobility deficits, address ROM deficits, address strength deficits, analyze and address soft tissue restrictions, analyze and cue movement patterns, analyze and modify body mechanics/ergonomics and assess and modify postural abnormalities to attain remaining goals. []  See Plan of Care  []  See progress note/recertification  []  See Discharge Summary         Progress towards goals / Updated goals:  Short Term Goals: To be accomplished in 1 weeks:              1. Pt will be compliant with self massage to decrease IT band Pain and tenderness. Status at Eval: Moderate IT Band pain. Progressing. Reviewed technique today. 3/8/22                Long Term Goals: To be accomplished in 1 weeks:  1. Pt will increase FOTO score by   pts to improve function. Status at Eval: FOTO=56     2. Pt will report >60% improvement in sx since onset to ease with ADL's. Status at Eval: 0%  Current: Progressing: Patient reports 40% improvement in symptoms since [facility]. (3/15/22)      3. Pt will report being able to sleep >4 hrs as she finds a POC with decreased sx. Status at Eval: Sleeps <4hrs  Current: Cont's to report difficulty sleeping (3/22/2022)        4. Pt will be able to carry groceries \"not limited at all\" per FOTO to improve activity tolerance.   Status at Eval:Limited a Little. Current: trial of SI belt today to improve ease with ADLs.  (3/1822)    PLAN  []  Upgrade activities as tolerated     [x]  Continue plan of care  []  Update interventions per flow sheet       []  Discharge due to:_  []  Other:_      Jaqui Alcaraz Winter, PTA 3/22/2022  12:52 PM    Future Appointments   Date Time Provider Clifford Jensen   3/28/2022 11:15 AM Lita Phillips [facility]   3/29/2022  9:00 AM Marlo You, PTA [facility]   3/29/2022 11:15 AM Lita Phillips [facility]   4/1/2022  9:00 AM Marlo You, PTA [facility]   7/25/2022  9:00 AM Keegan Carvajal MD [facility] AMB

## 2022-03-24 ENCOUNTER — APPOINTMENT (OUTPATIENT)
Dept: PHYSICAL THERAPY | Age: 72
End: 2022-03-24
Payer: MEDICARE

## 2022-03-28 ENCOUNTER — HOSPITAL ENCOUNTER (OUTPATIENT)
Dept: PHYSICAL THERAPY | Age: 72
Discharge: HOME | End: 2022-03-28
Payer: MEDICARE

## 2022-03-28 PROCEDURE — 97110 THERAPEUTIC EXERCISES: CPT

## 2022-03-28 PROCEDURE — 97112 NEUROMUSCULAR REEDUCATION: CPT

## 2022-03-28 NOTE — PROGRESS NOTES
PT DAILY TREATMENT NOTE     Patient Name: Brandee Murillo  Date:3/28/2022  : 1950  [x]  Patient  Verified  Payor: Lidia Pabon / Plan: [place] MEDICARE PART A & B / Product Type: Medicare /    In time: 11:15  Out time: 11:58  Total Treatment Time (min): 43  Visit #: 7 of     Medicare/BCBS Only   Total Timed Codes (min):  43 1:1 Treatment Time:  43       Treatment Area: Other low back pain [M54.59]    SUBJECTIVE  Pain Level (0-10 scale): 0-1/10  Any medication changes, allergies to medications, adverse drug reactions, diagnosis change, or new procedure performed?: [x] No    [] Yes (see summary sheet for update)  Subjective functional status/changes:   [] No changes reported  Pt reports her pain bounces back and forth between a 0-1. She feels like she can stand up straight and is suddenly strong when she wears the SI belt. When she uses the lidocaine patch and the belt she feels good, she went to a concert the other night and she was able to tolerate standing for 2 hours, with intermittent leaning on a wall. OBJECTIVE    28 min Therapeutic Exercise:  [x] See flow sheet :   Rationale: increase ROM and increase strength to improve the patients ability to perform ADLs    15 min Neuromuscular Re-education:  [x]  See flow sheet :   Rationale: increase strength, improve coordination, improve balance and increase proprioception  to improve the patients ability to perform functional tasks          With   [x] TE   [] TA   [] neuro   [] other: Patient Education: [x] Review HEP - verbal addition of hip flexor S  [] Progressed/Changed HEP based on:   [] positioning   [] body mechanics   [] transfers   [] heat/ice application    [] other:      Other Objective/Functional Measures:    - increased reps and resistance as noted on FS    Pain Level (0-10 scale) post treatment: 0/10    ASSESSMENT/Changes in Function:   Patient is progressing slowly with subjective report of increased activity tolerance with SI belt.  Karolon Kahlil tolerance to progression today with moderate ms fatigue but no pain. Patient demonstrates significant hip IR and ER strength, IR>ER in all planes. Focus is on core and lumbopelvic strength and stability to maintain pelvic alignment and decrease SIJ sx. Patient will continue to benefit from skilled PT services to modify and progress therapeutic interventions, address functional mobility deficits, address ROM deficits, address strength deficits, analyze and address soft tissue restrictions, analyze and cue movement patterns, analyze and modify body mechanics/ergonomics and assess and modify postural abnormalities to attain remaining goals. []  See Plan of Care  []  See progress note/recertification  []  See Discharge Summary         Progress towards goals / Updated goals:  Short Term Goals: To be accomplished in 1 weeks:  1. Pt will be compliant with self massage to decrease IT band Pain and tenderness. Status at Eval: Moderate IT Band pain. Progressing. Reviewed technique today. 3/8/22                Long Term Goals: To be accomplished in 1 weeks:  1. Pt will increase FOTO score by pts to improve function. Status at [facility]: FOTO=56     2. Pt will report >60% improvement in sx since onset to ease with ADL's. Status at Eval: 0%  Current: Progressing: Patient reports 40% improvement in symptoms since [facility]. (3/15/22)      3. Pt will report being able to sleep >4 hrs as she finds a POC with decreased sx. Status at Eval: Sleeps <4hrs  Current: Cont's to report difficulty sleeping (3/22/2022)     4. Pt will be able to carry groceries \"not limited at all\" per FOTO to improve activity tolerance.   Status at Eval:Limited a Little. Current: trial of SI belt today to improve ease with ADLs.  (3/1822)    PLAN  []  Upgrade activities as tolerated     [x]  Continue plan of care  []  Update interventions per flow sheet       []  Discharge due to:_  []  Other:_      CRISSY Cross 3/28/2022  11:58 AM    Future Appointments   Date Time Provider Clifford Camila   3/28/2022 11:15 AM Mile Ovalles [facility]   3/29/2022  9:00 AM Rozell Osler, PTA [facility]   3/29/2022 11:15 AM Mile Ovalles [facility]   4/1/2022  9:00 AM Rozell Osler, PTA [facility]   7/25/2022  9:00 AM Zeina Frank MD [facility] AMB

## 2022-03-29 ENCOUNTER — APPOINTMENT (OUTPATIENT)
Dept: PHYSICAL THERAPY | Age: 72
End: 2022-03-29
Payer: MEDICARE

## 2022-03-29 ENCOUNTER — HOSPITAL ENCOUNTER (OUTPATIENT)
Dept: PHYSICAL THERAPY | Age: 72
Discharge: HOME | End: 2022-03-29
Payer: MEDICARE

## 2022-03-29 PROCEDURE — 97140 MANUAL THERAPY 1/> REGIONS: CPT

## 2022-03-29 PROCEDURE — 97110 THERAPEUTIC EXERCISES: CPT

## 2022-03-29 NOTE — PROGRESS NOTES
PT DAILY TREATMENT NOTE     Patient Name: Clementina Nassar  Date:3/29/2022  : 1950  [x]  Patient  Verified  Payor: Marciano Robles / Plan: [place] MEDICARE PART A & B / Product Type: Medicare /    In time:9:00  Out time:9:50  Total Treatment Time (min): 50  Visit #: 8 of 12    Medicare/BCBS Only   Total Timed Codes (min):  50 1:1 Treatment Time:  50       Treatment Area: Other low back pain [M54.59]    SUBJECTIVE  Pain Level (0-10 scale): 0/10  Any medication changes, allergies to medications, adverse drug reactions, diagnosis change, or new procedure performed?: [x] No    [] Yes (see summary sheet for update)  Subjective functional status/changes:   [x] No changes reported  Pt reports lidocaine patch on right glute and SI belt help with p! She was able to stand for a concert over the weekend with minimal pain. Pt reports continued p! With sleeping. OBJECTIVE      35 min Therapeutic Exercise:  [x] See flow sheet :   Rationale: increase ROM, increase strength, improve coordination, improve balance and increase proprioception to improve the patients ability to stand and ambulate for longer periods of time and increase overall LE strength. 15 min Manual Therapy:  Leg lengthening for right upslip, MET for right upslip, MET Right AI, shotgun technique, MET for left/left sacral torsion    The manual therapy interventions were performed at a separate and distinct time from the therapeutic activities interventions. Rationale: decrease pain, increase ROM, increase tissue extensibility and decrease edema  to increase ROM, ability to ambulate without p!, and  perform ADLs.       With   [x] TE   [] TA   [] neuro   [] other: Patient Education: [x] Review HEP    [x] Progressed/Changed HEP based on:   [] positioning   [] body mechanics   [] transfers   [] heat/ice application    [x] other: Hip IR weakness     Other Objective/Functional Measures:  Hip IR L:3+ R:3-  Hip ER L:4 R:3+  Educated on postioning pillows between knees while side sleeping  Educated pt on bringing in SI belt for next session  Noted hip ADD weakness after shot gun technique  Cueing to prevent trunk leaning for hip IR strengthening  Pain Level (0-10 scale) post treatment: 0/10    ASSESSMENT/Changes in Function:   Patient continues to show weakness in glute mms, hip IR, ER, and ADD causing a continued pelvic obliquity. Patient is able to manage pain and walk or ambulate for longer periods without pain with use of SI belt. Pt would benefit from continued skilled therapy to learn self corrections for pelvic obliquity, and further strengthen core and hips to maintain alignment for improved sleep and ease of standing without increased pain. Patient will continue to benefit from skilled PT services to increase overall hip and glute strength and ROM to allow her to complete ADLs without pain. She would benefit from continued bilateral Hip IR, ER, ADD, and glute med strengthening exercises to self correct SI and pelvis alignment. modify and progress therapeutic interventions, address functional mobility deficits, address ROM deficits, address strength deficits, analyze and address soft tissue restrictions, analyze and cue movement patterns, analyze and modify body mechanics/ergonomics and assess and modify postural abnormalities to attain remaining goals. [x]  See Plan of Care  [x]  See progress note/recertification  []  See Discharge Summary         Progress towards goals / Updated goals:  Short Term Goals: To be accomplished in 1 weeks:  1. Pt will be compliant with self massage to decrease IT band Pain and tenderness. Status at Eval: Moderate IT Band pain. Progressing. Reviewed technique today. 3/8/22                Long Term Goals: To be accomplished in 1 weeks:  1. Pt will increase FOTO score by pts to improve function. Status at Eval: FOTO=56     2. Pt will report >60% improvement in sx since onset to ease with ADL's.    Status at Eval: 0%  Current: Progressing: Patient reports 40% improvement in symptoms since [facility]. (3/15/22)      3. Pt will report being able to sleep >4 hrs as she finds a POC with decreased sx. Status at Eval: Sleeps <4hrs  Current: Cont's to report difficulty sleeping (3/22/2022)     4. Pt will be able to carry groceries \"not limited at all\" per FOTO to improve activity tolerance. Status at Eval:Limited a Little. Current: trial of SI belt today to improve ease with ADLs. (3/1822)    PLAN  [x]  Upgrade activities as tolerated     []  Continue plan of care  []  Update interventions per flow sheet       []  Discharge due to:_  []  Other:_      NATASHA Abarca 3/29/2022  9:00 AM    I was present during the entire treatment, directing and participating in the treatment.    CRISSY Santana     Future Appointments   Date Time Provider [facility]   4/1/2022  9:00 AM Deniz Schafer PTA [facility]   5/6/2022 10:00 AM MD PERNELL Rachel BS AMB   7/25/2022  9:00 AM Cherry Gillette MD [facility] BS AMB

## 2022-04-01 ENCOUNTER — HOSPITAL ENCOUNTER (OUTPATIENT)
Dept: PHYSICAL THERAPY | Age: 72
Discharge: HOME | End: 2022-04-01
Payer: MEDICARE

## 2022-04-01 PROCEDURE — 97530 THERAPEUTIC ACTIVITIES: CPT

## 2022-04-01 PROCEDURE — 97140 MANUAL THERAPY 1/> REGIONS: CPT

## 2022-04-01 NOTE — PROGRESS NOTES
[facility]  [employer]  [address] [facility]  [phone] fax    Continued Plan of Care/ Re-certification for Physical Therapy Services    Patient name: Marco Antonio Jiménez Start of Care: 3/4/2022   Referral source: Kamryn Hopkins MD : 1950   Medical/Treatment Diagnosis: Other low back pain [M54.59]  Payor: Harvey Souza / Plan: Cassandra Frye / Product Type: Medicare /  Onset Date:22      Prior Hospitalization: see medical history Provider#: [id number]   Medications: Verified on Patient Summary List    Comorbidities: HTN, Breast Cancer 2017. Prior Level of Function: Retired Nurse. Visits from Start of Care: 9    Missed Visits: 0    The Plan of Care and following information is based on the patient's current status:  Goal: Pt will be compliant with self massage to decrease IT band Pain and tenderness. Status at last note/certification: Moderate IT Band pain. Current Status: met    Goal: Pt will increase FOTO score by   pts to improve function. Status at last note/certification: [id number]  Current Status: not met 61/100    Goal: Pt will report >60% improvement in sx since onset to ease with ADLs. Status at last note/certification: 0%  Current Status: not met 50%    Goal: Pt will report being able to sleep >4 hrs as she finds a POC with decreased sx. Status at last note/certification: unable  Current Status: met 5 hours    Goal: Pt will be able to carry groceries \"not limited at all\" per FOTO to improve activity tolerance. Status at last note/certification: Limited a Little.   Current Status: not met \"Limited a little\"    Key functional changes: FOTO 61/100; 50% improvement; increased sleeping tolerance     Problems/ barriers to goal attainment:  strength for donning SI belt     Problem List: pain affecting function, decrease ROM, decrease strength, impaired gait/ balance, decrease ADL/ functional abilitiies, decrease activity tolerance, decrease flexibility/ joint mobility and decrease transfer abilities    Treatment Plan: Therapeutic exercise, Therapeutic activities, Neuromuscular re-education, Physical agent/modality, Gait/balance training, Manual therapy, Patient education, Self Care training, Functional mobility training, Home safety training and Stair training     Patient Goal (s) has been updated and includes: \"to be able to walk longer without pain\"    Goals for this certification period to be accomplished in 4 weeks:  1. Patient will increase FOTO by 13 points (69/100) to demonstrate improvement in functional mobility. 2. Patient will report \"not limited at all\" when asked on the FOTO questionnaire, \"How much difficulty would you have walking a mile? \" to demonstrate return to PLOF and increased ease of community ambulation. 3. Patient will be able to independently perform pelvic corrections and don her SI belt for improved stability while strengthening for decreased pain and improved ease of walking. 4. Patient will be able to report ambulation tolerance of 30 minutes prior to increased pain to demonstrate improving strength and endurance for shopping and ADLs. 5. Patient will increase B hip internal and external rotation strength to 4/5 for improved stability in the hips for ease of transfers and ambulation with decreased pain. Frequency / Duration: Patient to be seen 1-2 times per week for 4 weeks:    Assessment / Recommendations: Ms. Anna Edmonds is making steady progress in therapy meeting 2/5 goals. She subjectively reports 50% improvement since start of care and ability to now sleep 5 hours at night without waking due to pain. She continues to present with a pelvic obliquity and sacral torsion due to decreased hip strength needed to maintain alignment corrections. She has been utilizing an SI belt with benefit for pelvic approximation and stability to improve ease of transfers, standing and walking.  She is only able to ambulate 15 minutes before increased pain. Her FOTO score increased 5 points to 61/100. Skilled PT remains medically necessary to progress ability to self correct her pelvic obliquity and don her SI belt, progress her core/hip strength to maintain alignment and further decrease pain for ease of sleeping, transfers and walking to return to OF. Certification Period: 4/2/2022 to 5/1/2022    Dick Weinstein, PTA 4/1/2022 3:12 PM    ________________________________________________________________________  I certify that the above Therapy Services are being furnished while the patient is under my care. I agree with the treatment plan and certify that this therapy is necessary. [] I have read the above and request that my patient continue as recommended.   [] I have read the above report and request that my patient continue therapy with the following changes/special instructions: _______________________________________  [] I have read the above report and request that my patient be discharged from therapy    Physician's Signature:____________Date:_________TIME:________     Enrique Dee MD  ** Signature, Date and Time must be completed for valid certification **    Please sign and return to [facility] [address]  [phone] fax

## 2022-04-01 NOTE — PROGRESS NOTES
PT DAILY TREATMENT NOTE     Patient Name: Antonino Krishnamurthy  PVYF:[id number]/3/[birth year]2  : 1950  [x]  Patient  Verified  Payor: Caryl Petersoner / Plan: [place] & [place] / Product Type: Medicare /    In time: 9:00  Out time: 9:45  Total Treatment Time (min): 45  Visit #: 9 of 12    Medicare/BCBS Only   Total Timed Codes (min):  45 1:1 Treatment Time:  45       Treatment Area: Other low back pain [M54.59]    SUBJECTIVE  Pain Level (0-10 scale): 2/10  Any medication changes, allergies to medications, adverse drug reactions, diagnosis change, or new procedure performed?: [x] No    [] Yes (see summary sheet for update)  Subjective functional status/changes:   [x] No changes reported  Pt reports that ball-like feeling back on her right side of her buttocks/hip. She states she tried to put the SI belt on this morning before walking but the pain was worse. She doesn't have good  from her hands and wants to know if she is putting on the belt right. She states she used to walk a lot and wants to get back to that. She can walk maybe 15 minutes before increased pain. She is now sleeping through the night which is 5 hours. She reports at least 50% improvement since [facility]. OBJECTIVE    30 min Therapeutic Activity:  [x] See flow sheet : FOTO; goal reassessment; pt education on self correction; education on donning SI belt   Rationale: increase ROM, increase strength, improve coordination, improve balance and increase proprioception to improve the patients ability to stand and ambulate for longer periods of time and increase overall LE strength. 15 min Manual Therapy:  Leg lengthening for right upslip; MET for right AI; shotgun technique; MET for left/left sacral torsion    The manual therapy interventions were performed at a separate and distinct time from the therapeutic activities interventions.   Rationale: decrease pain, increase ROM, increase tissue extensibility and decrease edema  to increase ROM, ability to ambulate without p!, and  perform ADLs. With   [x] TE   [] TA   [] neuro   [] other: Patient Education: [x] Review HEP    [x] Progressed/Changed HEP based on:   [] positioning   [] body mechanics   [] transfers   [] heat/ice application    [x] other: Hip IR weakness     Other Objective/Functional Measures: FOTO: 61/100  % Improvement: 50%  Hours able to sleep: 5 hours  Provided self correction for alignment  Trial of 1/8\" lift to left shoe for LLD  Improved gait post correction  Educated on self donning of SI belt after self correction and using side straps to tighten    Pain Level (0-10 scale) post treatment: 0.5/10    ASSESSMENT/Changes in Function: See Recertification. Patient will continue to benefit from skilled PT services to increase overall hip and glute strength and ROM to allow her to complete ADLs without pain. She would benefit from continued bilateral Hip IR, ER, ADD, and glute med strengthening exercises to self correct SI and pelvis alignment. modify and progress therapeutic interventions, address functional mobility deficits, address ROM deficits, address strength deficits, analyze and address soft tissue restrictions, analyze and cue movement patterns, analyze and modify body mechanics/ergonomics and assess and modify postural abnormalities to attain remaining goals. [x]  See Plan of Care  [x]  See progress note/recertification  []  See Discharge Summary         Progress towards goals / Updated goals:  Short Term Goals: To be accomplished in 1 weeks:  1. Pt will be compliant with self massage to decrease IT band Pain and tenderness. Status at Eval: Moderate IT Band pain. Progressing. Reviewed technique today. 3/8/22              Long Term Goals: To be accomplished in 1 weeks:  1. Pt will increase FOTO score by pts to improve function. Status at Eval: FOTO=56  Progressing 61/100   2. Pt will report >60% improvement in sx since onset to ease with ADL's.    Status at Eval: 0%  Current: Progressing: Patient reports 40% improvement in symptoms since [facility]. (3/15/22)   Progressing 50%  3. Pt will report being able to sleep >4 hrs as she finds a POC with decreased sx. Status at Eval: Sleeps <4hrs  Current: Cont's to report difficulty sleeping (3/22/2022)  MET 5 hours  4. Pt will be able to carry groceries \"not limited at all\" per FOTO to improve activity tolerance. Status at Eval:Limited a Little. Current: trial of SI belt today to improve ease with ADLs.  (3/1822)  Limited a little (4/1/22)      PLAN  [x]  Upgrade activities as tolerated     [x]  Continue plan of care  []  Update interventions per flow sheet       []  Discharge due to:_  []  Other:_      Reta Singh PTA 4/1/2022  9:00 AM      Future Appointments   Date Time Provider [facility]ila   4/1/2022  9:00 AM Deniz Schafer PTA [facility]   5/6/2022 10:00 AM MD PERNELL Rachel BS AMB   7/25/2022  9:00 AM Cherry Gillette MD [facility] BS AMB

## 2022-04-12 ENCOUNTER — HOSPITAL ENCOUNTER (OUTPATIENT)
Dept: PHYSICAL THERAPY | Age: 72
Discharge: HOME | End: 2022-04-12
Payer: MEDICARE

## 2022-04-12 PROCEDURE — 97140 MANUAL THERAPY 1/> REGIONS: CPT

## 2022-04-12 PROCEDURE — 97110 THERAPEUTIC EXERCISES: CPT

## 2022-04-12 NOTE — PROGRESS NOTES
PT DAILY TREATMENT NOTE     Patient Name: Liliana Feliz  VXPC:3[birth year]  : 1950  [x]  Patient  Verified  Payor: Mumtaz Amador / Plan: [place] MEDICARE PART A & B / Product Type: Medicare /    In time: 9:46  Out time: 10:30  Total Treatment Time (min): 44  Visit #: 1 of 8    Medicare/BCBS Only   Total Timed Codes (min):  44 1:1 Treatment Time:  44       Treatment Area: Other low back pain [M54.59]    SUBJECTIVE  Pain Level (0-10 scale): 1/10  Any medication changes, allergies to medications, adverse drug reactions, diagnosis change, or new procedure performed?: [x] No    [] Yes (see summary sheet for update)  Subjective functional status/changes:   [] No changes reported  Patient stated that she has noticed a big improvement since starting PT. Patient stated the lift in her shoe has been very helpful, and she has noticed she is able to walk easier and with more of an upright posture. OBJECTIVE    36 min Therapeutic Exercise:  [x] See flow sheet : focus on improving available trunk and (B) LE ROM and strength    Rationale: increase ROM and increase strength to improve the patients ability to perform ADLs safely. 8 min Manual Therapy:  Pelvic alignment assessment 2x. MET to correct right Anterior innominate rotation. Therapist guided isometrics Alternating Hip adduction/abduction in hook lying. The manual therapy interventions were performed at a separate and distinct time from the therapeutic activities interventions. Rationale: decrease pain and increase ROM to increase ease with ADLs. With   [] TE   [] TA   [] neuro   [] other: Patient Education: [x] Review HEP    [] Progressed/Changed HEP based on:   [] positioning   [] body mechanics   [] transfers   [] heat/ice application    [] other:      Other Objective/Functional Measures: Mild right anterior innominate rotation, corrected easily with MET. No upslip noted.        Pain Level (0-10 scale) post treatment: 0-0.5/10 ASSESSMENT/Changes in Function: Therapist provided cues for correct technique and muscle activation with exercises. Educated patient how to properly place on SI belt. Also administered 2 more heel lifts same height as heel lift patient is already using for patient to be able to use in other shoes. Patient challenged with standing hip 3-way with resistance so started with low reps, but patient denied increased pain. Patient reported decreased pain at end of the session. Patient will continue to benefit from skilled PT services to modify and progress therapeutic interventions, address functional mobility deficits, address ROM deficits, address strength deficits, analyze and address soft tissue restrictions, analyze and cue movement patterns, analyze and modify body mechanics/ergonomics, assess and modify postural abnormalities and address imbalance/dizziness to attain remaining goals. []  See Plan of Care  []  See progress note/recertification  []  See Discharge Summary         Progress towards goals / Updated goals:  Goals for this certification period to be accomplished in 4 weeks:  1. Patient will increase FOTO by 13 points (69/100) to demonstrate improvement in functional mobility. 2. Patient will report \"not limited at all\" when asked on the FOTO questionnaire, \"How much difficulty would you have walking a mile? \" to demonstrate return to PLOF and increased ease of community ambulation. 3. Patient will be able to independently perform pelvic corrections and don her SI belt for improved stability while strengthening for decreased pain and improved ease of walking. 4. Patient will be able to report ambulation tolerance of 30 minutes prior to increased pain to demonstrate improving strength and endurance for shopping and ADLs.   5. Patient will increase B hip internal and external rotation strength to 4/5 for improved stability in the hips for ease of transfers and ambulation with decreased pain.    PLAN  []  Upgrade activities as tolerated     [x]  Continue plan of care  []  Update interventions per flow sheet       []  Discharge due to:_  []  Other:_      Garth Kaplan, PT 4/12/2022  9:48 AM    Future Appointments   Date Time Provider Clifford Jensen   4/15/2022  9:00 AM Nawaf White [id number]   5/6/2022 10:00 AM MD JACQUELINE MckeonMA-MO BS AMB   7/25/2022  9:00 AM Joe Dougherty MD [facility] BS AMB

## 2022-04-15 ENCOUNTER — HOSPITAL ENCOUNTER (OUTPATIENT)
Dept: PHYSICAL THERAPY | Age: 72
Discharge: HOME | End: 2022-04-15
Payer: MEDICARE

## 2022-04-15 PROCEDURE — 97110 THERAPEUTIC EXERCISES: CPT

## 2022-04-15 PROCEDURE — 97140 MANUAL THERAPY 1/> REGIONS: CPT

## 2022-04-15 NOTE — PROGRESS NOTES
PT DAILY TREATMENT NOTE     Patient Name: Katharine Gallardo  CCRH:[birth year]  : 1950  [x]  Patient  Verified  Payor: Yvette Hopson / Plan: [place] MEDICARE PART A & B / Product Type: Medicare /    In time:9:04  Out time: 9:47  Total Treatment Time (min): 43  Visit #: 2 of 8    Medicare/BCBS Only   Total Timed Codes (min):  43 1:1 Treatment Time:  38       Treatment Area: Other low back pain [M54.59]    SUBJECTIVE  Pain Level (0-10 scale): 1/10  Any medication changes, allergies to medications, adverse drug reactions, diagnosis change, or new procedure performed?: [x] No    [] Yes (see summary sheet for update)  Subjective functional status/changes:   [] No changes reported  Patient stated she tried one of the heel lifts in her old shoes that don't have very good support, and noticed discomfort in left hip, so patient removed the heel lift/old shoes and put on shoes that have good support with the heel lift and it felt much better. Discussed importance of wearing shoes with good support. OBJECTIVE    35 min Therapeutic Exercise:  [x] See flow sheet : focus on improving available trunk and (B) LE ROM and strength    Rationale: increase ROM and increase strength to improve the patients ability to perform ADLs safely. 8 min Manual Therapy:  Pelvic alignment assessment 2x. MET to correct anterior innominate rotation. Therapist guided isometrics alternating hip abduction/adduction in hook lying. The manual therapy interventions were performed at a separate and distinct time from the therapeutic activities interventions. Rationale: decrease pain and increase ROM to increase ease with ADls. With   [] TE   [] TA   [] neuro   [] other: Patient Education: [x] Review HEP    [] Progressed/Changed HEP based on:   [] positioning   [] body mechanics   [] transfers   [] heat/ice application    [] other:      Other Objective/Functional Measures:  Anterior innominate rotation on the right noted.  Easily corrected with MET. Patient was able to don SI belt correctly with only SBA. Pain Level (0-10 scale) post treatment: 0/10     ASSESSMENT/Changes in Function: Therapist provided cues for correct technique and muscle activation with exercise. Added Seated Hip ER/IR isometrics to focus on hip rotational strength. Patient able to perform without increased pain. Patient challenged with standing hip 3-way with resistance so reduced reps as needed. Patient reported no pain at end of the session. Patient will continue to benefit from skilled PT services to modify and progress therapeutic interventions, address functional mobility deficits, address ROM deficits, address strength deficits, analyze and address soft tissue restrictions, analyze and cue movement patterns, analyze and modify body mechanics/ergonomics, assess and modify postural abnormalities and address imbalance/dizziness to attain remaining goals. []  See Plan of Care  []  See progress note/recertification  []  See Discharge Summary         Progress towards goals / Updated goals:  Goals for this certification period to be accomplished in 4 weeks:  1. Patient will increase FOTO by 13 points (69/100) to demonstrate improvement in functional mobility. 2. Patient will report \"not limited at all\" when asked on the FOTO questionnaire, \"How much difficulty would you have walking a mile? \" to demonstrate return to PLOF and increased ease of community ambulation. 3. Patient will be able to independently perform pelvic corrections and don her SI belt for improved stability while strengthening for decreased pain and improved ease of walking. 4. Patient will be able to report ambulation tolerance of 30 minutes prior to increased pain to demonstrate improving strength and endurance for shopping and ADLs. Current: Progressing: Patient reports ability to tolerate 15-20 minutes of ambulation (4/15/22)   5.  Patient will increase B hip internal and external rotation strength to 4/5 for improved stability in the hips for ease of transfers and ambulation with decreased pain.     PLAN  []  Upgrade activities as tolerated     [x]  Continue plan of care  []  Update interventions per flow sheet       []  Discharge due to:_  []  Other:_      Luther Malik, PT 4/15/2022  9:23 AM    Future Appointments   Date Time Provider Clifford Jensen   4/22/2022  8:15 AM Radha Corrigan, MATEUSZ [facility]   4/26/2022 10:30 AM Wesly Ribeiro, PT [id number] [facility]   4/28/2022  9:00 AM Maribeth Bosch, MATEUSZ [facility]   5/3/2022 11:15 AM Wesly Ribeiro, PT [facility]   5/5/2022  8:15 AM Maribeth Bosch, PTA [facility]   5/6/2022 10:00 AM MD HEATHER Velazquez-MO BS AMB   7/25/2022  9:00 AM Estela Teixeira MD [facility] BS AMB

## 2022-04-22 ENCOUNTER — HOSPITAL ENCOUNTER (OUTPATIENT)
Dept: PHYSICAL THERAPY | Age: 72
Discharge: HOME | End: 2022-04-22
Payer: MEDICARE

## 2022-04-22 PROCEDURE — 97110 THERAPEUTIC EXERCISES: CPT

## 2022-04-22 PROCEDURE — 97112 NEUROMUSCULAR REEDUCATION: CPT

## 2022-04-22 NOTE — PROGRESS NOTES
PT DAILY TREATMENT NOTE     Patient Name: Shirley Akbar  Date:2022  : 1950  [x]  Patient  Verified  Payor: Ash David / Plan: [place] / Product Type: Medicare /    In time: 8:16  Out time: 9:01  Total Treatment Time (min): 45  Visit #: 3 of 8    Medicare/BCBS Only   Total Timed Codes (min):  45 1:1 Treatment Time: 45       Treatment Area: Other low back pain [M54.59]    SUBJECTIVE  Pain Level (0-10 scale): 0/10  Any medication changes, allergies to medications, adverse drug reactions, diagnosis change, or new procedure performed?: [x] No    [] Yes (see summary sheet for update)  Subjective functional status/changes:   [] No changes reported  Patient reports she didn't come earlier in the week because she was in [place] visiting her mom and she was helping her with yard work but she work the [employer] belt and it helped. She did as much of her HEP as she could while she was there. OBJECTIVE    35 min Therapeutic Exercise:  [x] See flow sheet : focus on improving available trunk and (B) LE ROM and strength    Rationale: increase ROM and increase strength to improve the patients ability to perform ADLs safely. 10 min Neuromuscular Re-education:  [x] See flow sheet : Pelvic Alignment: right AI with pt education to self-correct with MET and shotgun   Rationale: increase ROM and increase strength to improve the patients ability to perform ADLs safely.         With   [x] TE   [] TA   [] neuro   [] other: Patient Education: [] Review HEP    [x] Progressed/Changed HEP based on: provided GTB to progress clams, hip IR/ER on HEP   [] positioning   [] body mechanics   [] transfers   [] heat/ice application    [x] other: education on self-correction of pelvic alignment and provided print out     Other Objective/Functional Measures:    Therapist guided self-correction MET/shotgun for right AI  GTB for HEP  Added clams I and II  Therapist resisted seated hip IR/ER    Pain Level (0-10 scale) post treatment: 0/10     ASSESSMENT/Changes in Function:   Patient is progressing slowly towards her goals, she requires S/SBA for self-MET and donning SI belt. She continues to demonstrate decreased hip IR>ER however is able to perform sidelying clamshell #2 with minimal ROM which she was not able to do at start of PT. Right LE appears weaker then left LE. Patient will continue to benefit from skilled PT services to modify and progress therapeutic interventions, address functional mobility deficits, address ROM deficits, address strength deficits, analyze and address soft tissue restrictions, analyze and cue movement patterns, analyze and modify body mechanics/ergonomics, assess and modify postural abnormalities and address imbalance/dizziness to attain remaining goals. []  See Plan of Care   []  See progress note/recertification  []  See Discharge Summary         Progress towards goals / Updated goals:  Goals for this certification period to be accomplished in 4 weeks:  1. Patient will increase FOTO by 13 points (69/100) to demonstrate improvement in functional mobility. Current:   2. Patient will report \"not limited at all\" when asked on the FOTO questionnaire, \"How much difficulty would you have walking a mile? \" to demonstrate return to PLOF and increased ease of community ambulation. Current:   3. Patient will be able to independently perform pelvic corrections and don her SI belt for improved stability while strengthening for decreased pain and improved ease of walking. Current: S/SBA for MET/shotgun and with donning SI belt (4/22/22)  4. Patient will be able to report ambulation tolerance of 30 minutes prior to increased pain to demonstrate improving strength and endurance for shopping and ADLs. Current: Progressing: Patient reports ability to tolerate 15-20 minutes of ambulation (4/15/22)   5.  Patient will increase B hip internal and external rotation strength to 4/5 for improved stability in the hips for ease of transfers and ambulation with decreased pain.   Current: initiated hip IR/ER isometrics on 4/15/22. (4/22/22)    PLAN  []  Upgrade activities as tolerated     [x]  Continue plan of care  []  Update interventions per flow sheet       []  Discharge due to:_  []  Other:_      Danya Valiente PTA 4/22/2022  9:01 AM    Future Appointments   Date Time Provider Clifford Camila   4/22/2022  8:15 AM Sailaja Saleem PTA [facility]   4/26/2022 10:30 AM Anupama Briggs, PT [facility]   4/28/2022  9:00 AM Baljinder Cordova, [facility]   5/3/2022 11:15 AM Anupama Briggs, PT [facility]   5/5/2022  8:15 AM Baljinder Cordova, [place] [facility]   5/6/2022 10:00 AM MD HEATHER Mclean-[facility] AM[facility]   7/25/2022  9:00 AM Jamie Elmore MD [facility] AM[facility]

## 2022-04-26 ENCOUNTER — HOSPITAL ENCOUNTER (OUTPATIENT)
Dept: PHYSICAL THERAPY | Age: 72
Discharge: HOME | End: 2022-04-26
Payer: MEDICARE

## 2022-04-26 PROCEDURE — 97140 MANUAL THERAPY 1/> REGIONS: CPT

## 2022-04-26 PROCEDURE — 97110 THERAPEUTIC EXERCISES: CPT

## 2022-04-26 NOTE — PROGRESS NOTES
PT DAILY TREATMENT NOTE     Patient Name: Katharine Gallardo  Date:2022  : 1950  [x]  Patient  Verified  Payor: [place] MEDICARE / Plan: [place] MEDICARE PART A & B / Product Type: Medicare /    In time:1036  Out time:1115  Total Treatment Time (min): 39  Visit #: 4 of 8    Medicare/BCBS Only   Total Timed Codes (min):  39 1:1 Treatment Time:  39       Treatment Area: Other low back pain [M54.59]    SUBJECTIVE  Pain Level (0-10 scale): 0/10  Any medication changes, allergies to medications, adverse drug reactions, diagnosis change, or new procedure performed?: [x] No    [] Yes (see summary sheet for update)  Subjective functional status/changes:   [] No changes reported  Reports she thinks her back is improving. SI belt helps for a while then begins to hurt and she has to take it off. She is very pleased with her progress so far. OBJECTIVE      25 min Therapeutic Exercise:  [] See flow sheet :   Rationale: increase ROM and increase strength to improve the patients ability to ease with adl's      14 min Manual Therapy:  Ball roll massage to right piriformis. IT band and glutes/ proximal quad. The manual therapy interventions were performed at a separate and distinct time from the therapeutic activities interventions. Rationale: decrease pain, increase ROM, increase tissue extensibility and decrease trigger points to ease with adl's          With   [] TE   [] TA   [] neuro   [] other: Patient Education: [x] Review HEP    [] Progressed/Changed HEP based on:   [] positioning   [] body mechanics   [] transfers   [] heat/ice application    [] other:      Other Objective/Functional Measures: Pt unable to perform a Right SLR due to Hip flexor weakness. She is not sure how long it's been that way. Pt  to work on right  supine heel taps and work her way until LE fully extended for SLR. Left proximal left quad and hip flexor tightness relieved with supine stretch.   Pain Level (0-10 scale) post treatment: 0/10    ASSESSMENT/Changes in Function: Progressing slowly. Pt has significant  Right Hip and LE weakness and limitations (Hip IR and hip flexion). She continues to have sharp pain in her SI area. She can tolerate walking a lot when she stops to stretch. Patient will continue to benefit from skilled PT services to modify and progress therapeutic interventions, address functional mobility deficits, address ROM deficits, address strength deficits, analyze and address soft tissue restrictions, analyze and cue movement patterns, analyze and modify body mechanics/ergonomics, assess and modify postural abnormalities and address imbalance/dizziness to attain remaining goals. [x]  See Plan of Care  []  See progress note/recertification  []  See Discharge Summary         Progress towards goals / Updated goals:  Goals for this certification period to be accomplished in 4 weeks:  1. Patient will increase FOTO by 13 points (69/100) to demonstrate improvement in functional mobility. Current:   2. Patient will report \"not limited at all\" when asked on the FOTO questionnaire, \"How much difficulty would you have walking a mile? \" to demonstrate return to PLOF and increased ease of community ambulation. Current:   3. Patient will be able to independently perform pelvic corrections and don her SI belt for improved stability while strengthening for decreased pain and improved ease of walking. Current: S/SBA for MET/shotgun and with donning SI belt (4/22/22)  4. Patient will be able to report ambulation tolerance of 30 minutes prior to increased pain to demonstrate improving strength and endurance for shopping and ADLs. Current: Progressing: Patient reports ability to tolerate 15-20 minutes of ambulation (4/15/22)   5. Patient will increase B hip internal and external rotation strength to 4/5 for improved stability in the hips for ease of transfers and ambulation with decreased pain.   Current: initiated hip IR/ER isometrics on 4/15/22. (4/22/22)    PLAN  [x]  Upgrade activities as tolerated     [x]  Continue plan of care  []  Update interventions per flow sheet       []  Discharge due to:_  []  Other:_      Vishnu Lake, PT 4/26/2022  10:39 AM    Future Appointments   Date Time Provider Clifford Rileyi   4/28/2022  9:00 AM Baljinder Cordova, [facility]   5/3/2022 11:15 AM Anupama Briggs, PT [facility]   5/5/2022  8:15 AM Baljinder Cordova PTA [facility]   5/6/2022 10:00 AM MD HEATHER Mclean-[facility] AMB   7/25/2022  9:00 AM Jamie Elmore MD [facility] AM[facility]

## 2022-04-28 ENCOUNTER — HOSPITAL ENCOUNTER (OUTPATIENT)
Dept: PHYSICAL THERAPY | Age: 72
Discharge: HOME | End: 2022-04-28
Payer: MEDICARE

## 2022-04-28 PROCEDURE — 97110 THERAPEUTIC EXERCISES: CPT

## 2022-04-28 PROCEDURE — 97112 NEUROMUSCULAR REEDUCATION: CPT

## 2022-04-28 NOTE — PROGRESS NOTES
PT DAILY TREATMENT NOTE     Patient Name: Delon Post  Date:2022  : 1950  [x]  Patient  Verified  Payor: Aicha Calero / Plan: [place] MEDICARE PART A & B / Product Type: Medicare /    In time:900  Out time:940  Total Treatment Time (min): 40  Visit #: 5 of 8    Medicare/BCBS Only   Total Timed Codes (min):  40 1:1 Treatment Time:  40       Treatment Area: Other low back pain [M54.59]    SUBJECTIVE  Pain Level (0-10 scale): 1-2/10  Any medication changes, allergies to medications, adverse drug reactions, diagnosis change, or new procedure performed?: [x] No    [] Yes (see summary sheet for update)  Subjective functional status/changes:   [] No changes reported  Pt stated that she had to lay on a stretcher for an hour yesterday for her endoscopy and that made her back sore. Reported that she got a severe cramp in her right gastroc after the procedure. OBJECTIVE    30 min Therapeutic Exercise:  [x] See flow sheet :   Rationale: increase ROM and increase strength to improve the patients ability to increase ease with ADLs     10 min Neuromuscular Re-education:  [x]  See flow sheet :   Rationale: increase strength, improve coordination and increase proprioception  to improve the patients ability to increase ease with functional tasks    With   [x] TE   [] TA   [] neuro   [] other: Patient Education: [x] Review HEP    [] Progressed/Changed HEP based on:   [] positioning   [] body mechanics   [] transfers   [] heat/ice application    [] other:      Other Objective/Functional Measures:   No difficulty with exercises  Was able to perform SLR flex B without difficulty, but did have some soreness in the right upper thigh afterwards which quickly resolved  Was pleased with workout today     Pain Level (0-10 scale) post treatment: 0/10    ASSESSMENT/Changes in Function:   Pt is making slow steady progress toward goals. Pt cont to report having some difficulty with performing household chores.  Cont with decreased walking tolerance, but is improving. Cont with weakness in B hips. Patient will continue to benefit from skilled PT services to modify and progress therapeutic interventions, address functional mobility deficits, address ROM deficits, address strength deficits, analyze and address soft tissue restrictions, analyze and cue movement patterns, analyze and modify body mechanics/ergonomics, assess and modify postural abnormalities, address imbalance/dizziness and instruct in home and community integration to attain remaining goals. []  See Plan of Care  [x]  See progress note/recertification  []  See Discharge Summary         Progress towards goals / Updated goals:  1. Patient will increase FOTO by 13 points (69/100) to demonstrate improvement in functional mobility. Current:   2. Patient will report \"not limited at all\" when asked on the FOTO questionnaire, \"How much difficulty would you have walking a mile? \" to demonstrate return to PLOF and increased ease of community ambulation. Current:   3. Patient will be able to independently perform pelvic corrections and don her SI belt for improved stability while strengthening for decreased pain and improved ease of walking.   Current: S/SBA for MET/shotgun and with donning SI belt (4/22/22)  4. Patient will be able to report ambulation tolerance of 30 minutes prior to increased pain to demonstrate improving strength and endurance for shopping and ADLs. Current: Progressing: Patient reports ability to tolerate 15-20 minutes of ambulation (4/15/22)   5. Patient will increase B hip internal and external rotation strength to 4/5 for improved stability in the hips for ease of transfers and ambulation with decreased pain.   Current: initiated hip IR/ER isometrics on 4/15/22. (4/22/22)    PLAN  []  Upgrade activities as tolerated     [x]  Continue plan of care  []  Update interventions per flow sheet       []  Discharge due to:_  []  Other:_      Seabrodia Osullivan Feliz Reasons 4/28/2022  6:32 AM    Future Appointments   Date Time Provider Clifford Camila   4/28/2022  9:00 AM Danita Mckenzie, [place] [facility]   5/3/2022 11:15 AM Crystal Willoughby, PT [facility]   5/5/2022  8:15 AM Danita Mckenzie PTA [facility]   5/6/2022 10:00 AM MD PERNELL Becker BS AMB   8/25/2022  8:15 AM Zeina Frank MD [facility] AMB

## 2022-05-03 ENCOUNTER — HOSPITAL ENCOUNTER (OUTPATIENT)
Dept: PHYSICAL THERAPY | Age: 72
Discharge: HOME | End: 2022-05-03
Payer: MEDICARE

## 2022-05-03 PROCEDURE — 97110 THERAPEUTIC EXERCISES: CPT

## 2022-05-03 NOTE — PROGRESS NOTES
PT DAILY TREATMENT NOTE - [facility]     Patient Name: Kyler Preciado  UUOS:[id number]  : 1950  [x]  Patient  Verified  Payor: [place] MEDICARE / Plan: [place] MEDICARE PART A & B / Product Type: Medicare /    In time:1115  Out time:1200  Total Treatment Time (min): 45  Visit #: 6 of 6    Treatment Area: Other low back pain [M54.59]    SUBJECTIVE  Pain Level (0-10 scale): 1/10  Any medication changes, allergies to medications, adverse drug reactions, diagnosis change, or new procedure performed?: [x] No    [] Yes (see summary sheet for update)  Subjective functional status/changes:   [] No changes reported  Reports she has made 70% improvement. She has learnt a lot about management of sx and knows it will not really go away. She walked >1 mile this weekend and had to stop. OBJECTIVE      45 min Therapeutic Exercise:  [] See flow sheet :   Rationale: increase ROM and increase strength to improve the patients ability to ease with adl's    With   [] TE   [] TA   [] neuro   [] other: Patient Education: [x] Review HEP    [] Progressed/Changed HEP based on:   [] positioning   [] body mechanics   [] transfers   [] heat/ice application    [] other:      Other Objective/Functional Measures: Updated HEP. Include stretches for HS, IT Band , QL and gastroc. FOTO=61  Pain Level (0-10 scale) post treatment: 0/10    ASSESSMENT/Changes in Function: Pt DC'd    Patient will continue to benefit from skilled PT services to modify and progress therapeutic interventions, address functional mobility deficits, address ROM deficits, address strength deficits, analyze and address soft tissue restrictions, analyze and cue movement patterns, analyze and modify body mechanics/ergonomics, assess and modify postural abnormalities and address imbalance/dizziness to attain remaining goals. []  See Plan of Care  [x]  See progress note/recertification  []  See Discharge Summary         Progress towards goals / Updated goals:  1.  Patient will increase FOTO by 13 points (69/100) to demonstrate improvement in functional mobility. Current:  not met  2. Patient will report \"not limited at all\" when asked on the FOTO questionnaire, \"How much difficulty would you have walking a mile? \" to demonstrate return to PLOF and increased ease of community ambulation. Current: some difficulty  3. Patient will be able to independently perform pelvic corrections and don her SI belt for improved stability while strengthening for decreased pain and improved ease of walking.   Current: S/SBA for MET/shotgun and with donning SI belt (4/22/22)  4. Patient will be able to report ambulation tolerance of 30 minutes prior to increased pain to demonstrate improving strength and endurance for shopping and ADLs. Current: Progressing: Patient reports ability to tolerate 15-20 minutes of ambulation (4/15/22)   5. Patient will increase B hip internal and external rotation strength to 4/5 for improved stability in the hips for ease of transfers and ambulation with decreased pain.   Current: initiated hip IR/ER isometrics on 4/15/22. (4/22/22)    PLAN  [x]  Upgrade activities as tolerated     [x]  Continue plan of care  []  Update interventions per flow sheet       []  Discharge due to:_  []  Other:_      Laci Hernández, PT 5/3/2022  10:32 AM    Future Appointments   Date Time Provider Clifford Jensen   5/3/2022 11:15 AM Wily Sanchez, PT [facility]   5/5/2022  8:15 AM Paz Perry PTA [facility]   5/6/2022 10:00 AM MD HEATHER Paredes-[facility] BS AMB   8/25/2022  8:15 AM Betzaida Ojeda MD [facility] BS AMB

## 2022-05-05 ENCOUNTER — APPOINTMENT (OUTPATIENT)
Dept: PHYSICAL THERAPY | Age: 72
End: 2022-05-05
Payer: MEDICARE

## 2022-05-06 ENCOUNTER — OFFICE VISIT (OUTPATIENT)
Dept: FAMILY MEDICINE CLINIC | Age: 72
End: 2022-05-06
Payer: MEDICARE

## 2022-05-06 VITALS
BODY MASS INDEX: 33.61 KG/M2 | SYSTOLIC BLOOD PRESSURE: 99 MMHG | WEIGHT: 178 LBS | HEART RATE: 76 BPM | TEMPERATURE: 96 F | RESPIRATION RATE: 16 BRPM | HEIGHT: 61 IN | DIASTOLIC BLOOD PRESSURE: 62 MMHG

## 2022-05-06 DIAGNOSIS — E78.2 MIXED HYPERLIPIDEMIA: ICD-10-CM

## 2022-05-06 DIAGNOSIS — I50.9 CHRONIC CONGESTIVE HEART FAILURE, UNSPECIFIED HEART FAILURE TYPE (HCC): ICD-10-CM

## 2022-05-06 DIAGNOSIS — M53.3 SACROILIAC DYSFUNCTION: ICD-10-CM

## 2022-05-06 DIAGNOSIS — I10 ESSENTIAL HYPERTENSION: Primary | ICD-10-CM

## 2022-05-06 PROBLEM — C50.411 BREAST CANCER OF UPPER-OUTER QUADRANT OF RIGHT FEMALE BREAST (HCC): Status: RESOLVED | Noted: 2018-01-29 | Resolved: 2022-05-06

## 2022-05-06 PROCEDURE — G8536 NO DOC ELDER MAL SCRN: HCPCS | Performed by: STUDENT IN AN ORGANIZED HEALTH CARE EDUCATION/TRAINING PROGRAM

## 2022-05-06 PROCEDURE — 1090F PRES/ABSN URINE INCON ASSESS: CPT | Performed by: STUDENT IN AN ORGANIZED HEALTH CARE EDUCATION/TRAINING PROGRAM

## 2022-05-06 PROCEDURE — G8510 SCR DEP NEG, NO PLAN REQD: HCPCS | Performed by: STUDENT IN AN ORGANIZED HEALTH CARE EDUCATION/TRAINING PROGRAM

## 2022-05-06 PROCEDURE — 99214 OFFICE O/P EST MOD 30 MIN: CPT | Performed by: STUDENT IN AN ORGANIZED HEALTH CARE EDUCATION/TRAINING PROGRAM

## 2022-05-06 PROCEDURE — 1101F PT FALLS ASSESS-DOCD LE1/YR: CPT | Performed by: STUDENT IN AN ORGANIZED HEALTH CARE EDUCATION/TRAINING PROGRAM

## 2022-05-06 PROCEDURE — G8417 CALC BMI ABV UP PARAM F/U: HCPCS | Performed by: STUDENT IN AN ORGANIZED HEALTH CARE EDUCATION/TRAINING PROGRAM

## 2022-05-06 PROCEDURE — G8754 DIAS BP LESS 90: HCPCS | Performed by: STUDENT IN AN ORGANIZED HEALTH CARE EDUCATION/TRAINING PROGRAM

## 2022-05-06 PROCEDURE — G8427 DOCREV CUR MEDS BY ELIG CLIN: HCPCS | Performed by: STUDENT IN AN ORGANIZED HEALTH CARE EDUCATION/TRAINING PROGRAM

## 2022-05-06 PROCEDURE — G8752 SYS BP LESS 140: HCPCS | Performed by: STUDENT IN AN ORGANIZED HEALTH CARE EDUCATION/TRAINING PROGRAM

## 2022-05-06 PROCEDURE — G9899 SCRN MAM PERF RSLTS DOC: HCPCS | Performed by: STUDENT IN AN ORGANIZED HEALTH CARE EDUCATION/TRAINING PROGRAM

## 2022-05-06 PROCEDURE — 3017F COLORECTAL CA SCREEN DOC REV: CPT | Performed by: STUDENT IN AN ORGANIZED HEALTH CARE EDUCATION/TRAINING PROGRAM

## 2022-05-06 RX ORDER — LABETALOL 200 MG/1
200 TABLET, FILM COATED ORAL 2 TIMES DAILY
Qty: 60 TABLET | Refills: 2 | Status: SHIPPED | OUTPATIENT
Start: 2022-05-06 | End: 2022-09-16 | Stop reason: SDUPTHER
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
## 2022-05-06 NOTE — PROGRESS NOTES
Chief Complaint   Patient presents with   [facility] Establish Care   1. \"Have you been to the ER, urgent care clinic since your last visit? Hospitalized since your last visit? \" No    2. \"Have you seen or consulted any other health care providers outside of the [address] [place] [address] since your last visit? \" Yes     3. For patients aged 39-70: Has the patient had a colonoscopy / FIT/ Cologuard? Yes - Care Gap present. Most recent result on file      If the patient is female:    4. For patients aged 41-77: Has the patient had a mammogram within the past 2 years? Yes - Care Gap present. Most recent result on file      5. For patients aged 21-65: Has the patient had a pap smear?  No

## 2022-09-15 ENCOUNTER — HOSPITAL ENCOUNTER (OUTPATIENT)
Dept: LAB | Age: 72
Discharge: HOME | End: 2022-09-15
Payer: MEDICARE

## 2022-09-15 DIAGNOSIS — Z13.29 SCREENING FOR THYROID DISORDER: ICD-10-CM

## 2022-09-15 DIAGNOSIS — I10 ESSENTIAL HYPERTENSION: ICD-10-CM

## 2022-09-15 DIAGNOSIS — E78.00 PURE HYPERCHOLESTEROLEMIA: ICD-10-CM

## 2022-09-15 DIAGNOSIS — Z11.59 ENCOUNTER FOR HEPATITIS C SCREENING TEST FOR LOW RISK PATIENT: ICD-10-CM

## 2022-09-15 DIAGNOSIS — E66.9 OBESITY (BMI 30.0-34.9): ICD-10-CM

## 2022-09-15 LAB
ALBUMIN SERPL-MCNC: 4 G/DL (ref 3.4–5)
ALBUMIN/GLOB SERPL: 1.5 {RATIO} (ref 0.8–1.7)
ALP SERPL-CCNC: 72 U/L (ref 45–117)
ALT SERPL-CCNC: 25 U/L (ref 13–56)
ANION GAP SERPL CALC-SCNC: 5 MMOL/L (ref 3–18)
AST SERPL-CCNC: 17 U/L (ref 10–38)
BASOPHILS # BLD: 0 K/UL (ref 0–0.1)
BASOPHILS NFR BLD: 1 % (ref 0–2)
BILIRUB SERPL-MCNC: 0.4 MG/DL (ref 0.2–1)
BUN SERPL-MCNC: 20 MG/DL (ref 7–18)
BUN/CREAT SERPL: 21 (ref 12–20)
CALCIUM SERPL-MCNC: 9.2 MG/DL (ref 8.5–10.1)
CHLORIDE SERPL-SCNC: 111 MMOL/L (ref 100–111)
CHOLEST SERPL-MCNC: 228 MG/DL
CO2 SERPL-SCNC: 25 MMOL/L (ref 21–32)
CREAT SERPL-MCNC: 0.95 MG/DL (ref 0.6–1.3)
DIFFERENTIAL METHOD BLD: ABNORMAL
EOSINOPHIL # BLD: 0.1 K/UL (ref 0–0.4)
EOSINOPHIL NFR BLD: 2 % (ref 0–5)
ERYTHROCYTE [DISTWIDTH] IN BLOOD BY AUTOMATED COUNT: 13.8 % (ref 11.6–14.5)
GLOBULIN SER CALC-MCNC: 2.7 G/DL (ref 2–4)
GLUCOSE SERPL-MCNC: 101 MG/DL (ref 74–99)
HCT VFR BLD AUTO: 37.6 % (ref 35–45)
HDLC SERPL-MCNC: 52 MG/DL (ref 40–60)
HDLC SERPL: 4.4 {RATIO} (ref 0–5)
HGB BLD-MCNC: 12.2 G/DL (ref 12–16)
IMM GRANULOCYTES # BLD AUTO: 0 K/UL (ref 0–0.04)
IMM GRANULOCYTES NFR BLD AUTO: 0 % (ref 0–0.5)
LDLC SERPL CALC-MCNC: 131 MG/DL (ref 0–100)
LIPID PROFILE,FLP: ABNORMAL
LYMPHOCYTES # BLD: 1.3 K/UL (ref 0.9–3.6)
LYMPHOCYTES NFR BLD: 26 % (ref 21–52)
MCH RBC QN AUTO: 30.5 PG (ref 24–34)
MCHC RBC AUTO-ENTMCNC: 32.4 G/DL (ref 31–37)
MCV RBC AUTO: 94 FL (ref 78–100)
MONOCYTES # BLD: 0.4 K/UL (ref 0.05–1.2)
MONOCYTES NFR BLD: 8 % (ref 3–10)
NEUTS SEG # BLD: 3 K/UL (ref 1.8–8)
NEUTS SEG NFR BLD: 63 % (ref 40–73)
NRBC # BLD: 0 K/UL (ref 0–0.01)
NRBC BLD-RTO: 0 PER 100 WBC
PLATELET # BLD AUTO: 239 K/UL (ref 135–420)
PMV BLD AUTO: 10 FL (ref 9.2–11.8)
POTASSIUM SERPL-SCNC: 4.4 MMOL/L (ref 3.5–5.5)
PROT SERPL-MCNC: 6.7 G/DL (ref 6.4–8.2)
RBC # BLD AUTO: 4 M/UL (ref 4.2–5.3)
SODIUM SERPL-SCNC: 141 MMOL/L (ref 136–145)
T4 FREE SERPL-MCNC: 0.9 NG/DL (ref 0.7–1.5)
TRIGL SERPL-MCNC: 225 MG/DL (ref ?–150)
TSH SERPL DL<=0.05 MIU/L-ACNC: 1.95 UIU/ML (ref 0.36–3.74)
VLDLC SERPL CALC-MCNC: 45 MG/DL
WBC # BLD AUTO: 4.8 K/UL (ref 4.6–13.2)

## 2022-09-15 PROCEDURE — 84443 ASSAY THYROID STIM HORMONE: CPT

## 2022-09-15 PROCEDURE — 80061 LIPID PANEL: CPT

## 2022-09-15 PROCEDURE — 36415 COLL VENOUS BLD VENIPUNCTURE: CPT

## 2022-09-15 PROCEDURE — 86803 HEPATITIS C AB TEST: CPT

## 2022-09-15 PROCEDURE — 84439 ASSAY OF FREE THYROXINE: CPT

## 2022-09-15 PROCEDURE — 80053 COMPREHEN METABOLIC PANEL: CPT

## 2022-09-15 PROCEDURE — 85025 COMPLETE CBC W/AUTO DIFF WBC: CPT

## 2022-09-16 ENCOUNTER — OFFICE VISIT (OUTPATIENT)
Dept: FAMILY MEDICINE CLINIC | Age: 72
End: 2022-09-16
Payer: MEDICARE

## 2022-09-16 VITALS
HEART RATE: 78 BPM | DIASTOLIC BLOOD PRESSURE: 84 MMHG | SYSTOLIC BLOOD PRESSURE: 132 MMHG | TEMPERATURE: 97.8 F | BODY MASS INDEX: 34.36 KG/M2 | HEIGHT: 61 IN | OXYGEN SATURATION: 96 % | WEIGHT: 182 LBS

## 2022-09-16 DIAGNOSIS — M81.0 AGE-RELATED OSTEOPOROSIS WITHOUT CURRENT PATHOLOGICAL FRACTURE: ICD-10-CM

## 2022-09-16 DIAGNOSIS — E78.00 PURE HYPERCHOLESTEROLEMIA: ICD-10-CM

## 2022-09-16 DIAGNOSIS — I10 ESSENTIAL HYPERTENSION: ICD-10-CM

## 2022-09-16 DIAGNOSIS — Z23 ENCOUNTER FOR IMMUNIZATION: Primary | ICD-10-CM

## 2022-09-16 LAB
HCV AB SER IA-ACNC: <0.02 INDEX
HCV AB SERPL QL IA: NEGATIVE
HCV COMMENT,HCGAC: NORMAL

## 2022-09-16 PROCEDURE — 90662 IIV NO PRSV INCREASED AG IM: CPT | Performed by: STUDENT IN AN ORGANIZED HEALTH CARE EDUCATION/TRAINING PROGRAM

## 2022-09-16 PROCEDURE — 1090F PRES/ABSN URINE INCON ASSESS: CPT | Performed by: STUDENT IN AN ORGANIZED HEALTH CARE EDUCATION/TRAINING PROGRAM

## 2022-09-16 PROCEDURE — 3017F COLORECTAL CA SCREEN DOC REV: CPT | Performed by: STUDENT IN AN ORGANIZED HEALTH CARE EDUCATION/TRAINING PROGRAM

## 2022-09-16 PROCEDURE — G8754 DIAS BP LESS 90: HCPCS | Performed by: STUDENT IN AN ORGANIZED HEALTH CARE EDUCATION/TRAINING PROGRAM

## 2022-09-16 PROCEDURE — G8752 SYS BP LESS 140: HCPCS | Performed by: STUDENT IN AN ORGANIZED HEALTH CARE EDUCATION/TRAINING PROGRAM

## 2022-09-16 PROCEDURE — 1123F ACP DISCUSS/DSCN MKR DOCD: CPT | Performed by: STUDENT IN AN ORGANIZED HEALTH CARE EDUCATION/TRAINING PROGRAM

## 2022-09-16 PROCEDURE — 1101F PT FALLS ASSESS-DOCD LE1/YR: CPT | Performed by: STUDENT IN AN ORGANIZED HEALTH CARE EDUCATION/TRAINING PROGRAM

## 2022-09-16 PROCEDURE — G8536 NO DOC ELDER MAL SCRN: HCPCS | Performed by: STUDENT IN AN ORGANIZED HEALTH CARE EDUCATION/TRAINING PROGRAM

## 2022-09-16 PROCEDURE — G8417 CALC BMI ABV UP PARAM F/U: HCPCS | Performed by: STUDENT IN AN ORGANIZED HEALTH CARE EDUCATION/TRAINING PROGRAM

## 2022-09-16 PROCEDURE — 99214 OFFICE O/P EST MOD 30 MIN: CPT | Performed by: STUDENT IN AN ORGANIZED HEALTH CARE EDUCATION/TRAINING PROGRAM

## 2022-09-16 PROCEDURE — G8510 SCR DEP NEG, NO PLAN REQD: HCPCS | Performed by: STUDENT IN AN ORGANIZED HEALTH CARE EDUCATION/TRAINING PROGRAM

## 2022-09-16 PROCEDURE — G9899 SCRN MAM PERF RSLTS DOC: HCPCS | Performed by: STUDENT IN AN ORGANIZED HEALTH CARE EDUCATION/TRAINING PROGRAM

## 2022-09-16 PROCEDURE — G0008 ADMIN INFLUENZA VIRUS VAC: HCPCS | Performed by: STUDENT IN AN ORGANIZED HEALTH CARE EDUCATION/TRAINING PROGRAM

## 2022-09-16 PROCEDURE — G8428 CUR MEDS NOT DOCUMENT: HCPCS | Performed by: STUDENT IN AN ORGANIZED HEALTH CARE EDUCATION/TRAINING PROGRAM

## 2022-09-16 RX ORDER — ROSUVASTATIN CALCIUM 10 MG/1
10 TABLET, COATED ORAL
Qty: 90 TABLET | Refills: 1 | Status: SHIPPED | OUTPATIENT
Start: 2022-09-16

## 2022-09-16 RX ORDER — RALOXIFENE HYDROCHLORIDE 60 MG/1
60 TABLET, FILM COATED ORAL DAILY
Qty: 90 TABLET | Refills: 1 | Status: SHIPPED | OUTPATIENT
Start: 2022-09-16

## 2022-09-16 RX ORDER — LABETALOL 200 MG/1
200 TABLET, FILM COATED ORAL 2 TIMES DAILY
Qty: 180 TABLET | Refills: 2 | Status: SHIPPED | OUTPATIENT
Start: 2022-09-16

## 2022-09-16 NOTE — PROGRESS NOTES
After obtaining consent, and per orders of Dr. Holli Guaman, injection of Influenza Vaccine given by Rosario Ga. Patient instructed to remain in clinic for 20 minutes afterwards, and to report any adverse reaction to me immediately. 1. \"Have you been to the ER, urgent care clinic since your last visit? Hospitalized since your last visit? \" No    2. \"Have you seen or consulted any other health care providers outside of the [address] [place] [address] since your last visit? \" No     3. For patients aged 39-70: Has the patient had a colonoscopy / FIT/ Cologuard? Yes - no Care Gap present      If the patient is female:    4. For patients aged 41-77: Has the patient had a mammogram within the past 2 years? Yes - Care Gap present. Most recent result on file      5. For patients aged 21-65: Has the patient had a pap smear?  NA - based on age or sex
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
## 2022-09-23 NOTE — ANCILLARY DISCHARGE INSTRUCTIONS
[facility]  [address] [place] [address] Sami  [phone] fax    Physical Therapy Discharge Summary  Patient name: Artis Godinez Start of Care: 3/4/2022   Referral source: Enrique Dee MD : 1950   Medical/Treatment Diagnosis: Other low back pain [M54.59]  Payor: Jean-Paul Smyth / Plan: [employer] / Product Type: Medicare /  Onset Date:22       Prior Hospitalization: see medical history Provider#: [id number]   Medications: Verified on Patient Summary List     Comorbidities: HTN, Breast Cancer 2017. Prior Level of Function: Retired Nurse. Visits from Start of Care: 15  Missed Visits: 0    Reporting Period : 5/3/22 to 5/3/22    Summary of Care:  Goal:Pt will be issued an updated HEP to continue to improve Functional mobility. Status at last note/certification:Issued  Status at discharge: met      Reports she has made 70% improvement. She was issued an SI belt to improve with hip stability thereby improving mobility. She has learnt a lot about management of sx and knows it will not really go away. She has walked >1 mile  however pt continues to have decreased tolerance due to muscle pain and cramping. Pt DC'd to HEP.      ASSESSMENT/RECOMMENDATIONS:  [x]Discontinue therapy: []Patient has reached or is progressing toward set goals      []Patient is non-compliant or has abdicated      []Due to lack of appreciable progress towards set goals    Dov Chaudhari, PT 2022 11:00 AM

## 2022-09-23 NOTE — PROGRESS NOTES
[facility]  [address] [place] [address]  [phone] fax    Continued Plan of Care/ Re-certification for Physical Therapy Services     Patient name: Marlin Tabor Start of Care: 3/4/2022   Referral source: Jahaira Fritz MD : 1950   Medical/Treatment Diagnosis: Other low back pain [M54.59]  Payor: Rakel Carr / Plan: [address] / Product Type: Medicare /  Onset Date:22       Prior Hospitalization: see medical history Provider#: [id number]   Medications: Verified on Patient Summary List     Comorbidities: HTN, Breast Cancer 2017. Prior Level of Function: Retired Nurse. Visits from Start of Care: 15               The Plan of Care and following information is based on the patient's current status:  Goal:Patient will increase FOTO by 13 points (69/100) to demonstrate improvement in functional mobility. Status at last note/certification:FOTO=56  Current Status: not met. FOTO=61    GoalPatient will report \"not limited at all\" when asked on the FOTO questionnaire, \"How much difficulty would you have walking a mile? \" to demonstrate return to PLOF and increased ease of community ambulation. Status at last note/certification:  Current Status: not met. Some difficulty    Goal:Patient will be able to independently perform pelvic corrections and don her SI belt for improved stability while strengthening for decreased pain and improved ease of walking. Status at last note/certification: Issued corrections to pt. Current Status: Progressing. S/SBA for MET/shotgun and with donning SI belt     Goal:Patient will be able to report ambulation tolerance of 30 minutes prior to increased pain to demonstrate improving strength and endurance for shopping and ADLs. Status at last note/certification:difficulty  Current Status: Progressing. Patient reports ability to tolerate 15-20 minutes of ambulation     .  Patient will increase B hip internal and external rotation strength to 4/5 for improved stability in the hips for ease of transfers and ambulation with decreased pain. Current: initiated hip IR/ER isometrics on 4/15/22. (4/22/22)    Key functional changes: Improved ambulation function. Problems/ barriers to goal attainment: Nerve Pain to Thorax. Problem List: pain affecting function, decrease ROM, decrease strength, decrease ADL/ functional abilitiies, and decrease flexibility/ joint mobility    Treatment Plan: Therapeutic exercise, Therapeutic activities, Neuromuscular re-education, Physical agent/modality, Gait/balance training, Manual therapy, Patient education, Self Care training, and Functional mobility training     Patient Goal (s) has been updated and includes: to be able to walk w/o LBP. Goals for this certification period to be accomplished in 1 treatments:  Pt will be issued an updated HEP to continue to improve Functional mobility. Frequency / Duration: Patient to be seen 1-2 times per week for 1 weeks:    Assessment / Recommendations: Pt made steady progress toward goals. Pt cont to report having some difficulty with performing household chores. Pt can independently self correct her hips. She has been compliant with HEP and attendance. Cont with decreased walking tolerance, but is improving    Certification Period: 5/1/22-5/30/22    Krish Grimm, PT 9/23/2022 10:46 AM    ________________________________________________________________________  I certify that the above Therapy Services are being furnished while the patient is under my care. I agree with the treatment plan and certify that this therapy is necessary. [] I have read the above and request that my patient continue as recommended.   [] I have read the above report and request that my patient continue therapy with the following changes/special instructions: _______________________________________  [] I have read the above report and request that my patient be discharged from therapy    Physician's Signature:____________Date:_________TIME:________     Emil Butler MD  ** Signature, Date and Time must be completed for valid certification **    Please sign and return to [facility]  [address] [place] [address]  [phone] fax

## 2022-10-28 NOTE — MR AVS SNAPSHOT
Tried to schedule an appointment for 6 months (around 12/7/2021) for Eliquis, Atrial fibrillation, CMP, BiV ICD, [place] with Dr Senior at [facility] (this is where the patient sees the provider) and the first opening is in May 2023.  The patient was talking about his battery being replaced and doesn't think he could possibly wait that long.     Ok to leave message and he will call back    Visit Information Date & Time Provider Department Dept. Phone Encounter #  
 11/3/2017  8:45 AM Pearl Fam MD [facility] [phone] [id number] Your Appointments 12/13/2017  9:00 AM  
Follow Up with Pearl Fam MD  
[address] [facility]) Appt Note: f/u R lumpectomy [address] [facility] [place] [address] [place] [address]  
[phone]  
  
   
 [address] [facility] [place] [address] [facility] [address] Upcoming Health Maintenance Date Due Hepatitis C Screening 1950 DTaP/Tdap/Td series (1 - Tdap) 8/25/1972 ZOSTER VACCINE AGE 60> 6/25/2011 GLAUCOMA SCREENING Q2Y 8/25/2016 OSTEOPOROSIS SCREENING (DEXA) 8/25/2016 Pneumococcal 65+ High/Highest Risk (1 of 2 - PCV13) 8/25/2016 MEDICARE YEARLY EXAM 8/25/2016 INFLUENZA AGE 9 TO ADULT 8/1/2017 BREAST CANCER SCRN MAMMOGRAM 10/24/2019 COLONOSCOPY 10/17/2022 Allergies as of 11/3/2017  Review Complete On: 11/3/2017 By: Olaf Loyola LPN Severity Noted Reaction Type Reactions Lipitor [Atorvastatin]  03/01/2017    Myalgia Quit 2/17 Nsaids (Non-steroidal Anti-inflammatory Drug)  10/17/2012    Other (comments) Severe abdominal pain  
 Pcn [Penicillins]  10/17/2012    Swelling Swelling, hives & photosensitive rxn Current Immunizations  Reviewed on 10/28/2016 Name Date Influenza Vaccine 9/19/2016 Poliovirus vaccine 5/14/1998 Not reviewed this visit You Were Diagnosed With   
  
 Codes Comments Lump or mass in breast    -  Primary ICD-10-CM: N63.0 ICD-9-CM: 611.72 Vitals BP Resp OB Status Smoking Status 110/80 16 Postmenopausal Former Smoker Vitals History Preferred Pharmacy Pharmacy Name Phone [phone] [facility] [phone] Your Updated Medication List  
  
   
 This list is accurate as of: 11/3/17  9:48 AM.  Always use your most recent med list.  
  
  
  
  
 albuterol 90 mcg/actuation inhaler Commonly known as:  PROVENTIL HFA, VENTOLIN HFA, PROAIR HFA Take 1 Puff by inhalation every four (4) hours as needed for Wheezing. DEXILANT 60 mg Cpdb Generic drug:  Dexlansoprazole Take  by mouth Daily (before breakfast). furosemide 20 mg tablet Commonly known as:  LASIX Take 1 Tab by mouth daily as needed. GAVISCON EXTRA STRENGTH 160-105 mg Dennie Ripper Generic drug:  aluminum hydrox-magnesium carb Take 2 Tabs by mouth as needed. labetalol 200 mg tablet Commonly known as:  NORMODYNE  
TAKE ONE TABLET BY MOUTH 2 TIMES A DAY LORazepam 0.5 mg tablet Commonly known as:  ATIVAN Take 1 Tab by mouth two (2) times a day. Max Daily Amount: 1 mg.  
  
 losartan 100 mg tablet Commonly known as:  COZAAR Take 1 Tab by mouth daily. OMEGA 3 PO Take  by mouth. RESTASIS 0.05 % ophthalmic emulsion Generic drug:  cycloSPORINE Administer 1 Drop to both eyes two (2) times a day. TYLENOL EXTRA STRENGTH 500 mg tablet Generic drug:  acetaminophen Take 1,000 mg by mouth every six (6) hours as needed. To-Do List   
 11/30/2017 Imaging:  [facility] NDL/WIRE/CLIP/SEED BREAST RT   
  
 11/30/2017 Imaging:  NM LYMPHOSCINTIG RT BREAST Introducing [facility]! Dear Malka Harris: Thank you for requesting a [employer] account. Our records indicate that you already have an active [employer] account. You can access your account anytime at [email or web address] Did you know that you can access your hospital and ER discharge instructions at any time in [employer]? You can also review all of your test results from your hospital stay or ER visit. Additional Information If you have questions, please visit the Frequently Asked Questions section of the [employer] website at [email or web address] Remember, [employer] is NOT to be used for urgent needs. For medical emergencies, dial 911. Now available from your iPhone and Android! Please provide this summary of care documentation to your next provider. Your primary care clinician is listed as Fatoumata Berrios. If you have any questions after today's visit, please call [phone].

## 2022-11-28 ENCOUNTER — TRANSCRIBE ORDER (OUTPATIENT)
Dept: SCHEDULING | Age: 72
End: 2022-11-28

## 2022-11-28 DIAGNOSIS — Z12.31 VISIT FOR SCREENING MAMMOGRAM: Primary | ICD-10-CM

## 2022-12-28 RX ORDER — LOSARTAN POTASSIUM 100 MG/1
100 TABLET ORAL DAILY
Qty: 90 TABLET | Refills: 3 | Status: CANCELLED | OUTPATIENT
Start: 2022-12-28

## 2022-12-28 NOTE — TELEPHONE ENCOUNTER
Requested Prescriptions     Pending Prescriptions Disp Refills    losartan (COZAAR) 100 mg tablet 90 Tablet 3     Sig: Take 1 Tablet by mouth daily.      Completely out

## 2022-12-29 ENCOUNTER — PATIENT MESSAGE (OUTPATIENT)
Dept: FAMILY MEDICINE CLINIC | Age: 72
End: 2022-12-29

## 2022-12-30 RX ORDER — LOSARTAN POTASSIUM 100 MG/1
100 TABLET ORAL DAILY
Qty: 90 TABLET | Refills: 3 | Status: SHIPPED | OUTPATIENT
Start: 2022-12-30

## 2022-12-30 NOTE — TELEPHONE ENCOUNTER
From: Elyse Leonardo  To: Candie Guido MD  Sent: 12/29/2022 5:19 PM EST  Subject: Losartan 100mg    Nelly, Dr Jennifer Reeves,  I have run out of my Losartan, and it was not refilled by Dr Ryan Gonzalez office. Since you have been refilling my prescriptions, I did not need an appointment with him. I could not request refill on here so I called the office but I have not received notice that a prescription was being prepared. Is it possible to have this called into [facility]?  I told the staff person on the phone that I would keep it there instead of [facility]. I appreciate any assistance with this,    Ryne Kelley

## 2023-01-06 RX ORDER — ALBUTEROL SULFATE 90 UG/1
1 AEROSOL, METERED RESPIRATORY (INHALATION)
Qty: 1 EACH | Refills: 5 | Status: SHIPPED | OUTPATIENT
Start: 2023-01-06

## 2023-01-06 NOTE — TELEPHONE ENCOUNTER
Requested Prescriptions     Pending Prescriptions Disp Refills    albuterol (PROVENTIL HFA, VENTOLIN HFA, PROAIR HFA) 90 mcg/actuation inhaler 1 Each 5     Sig: Take 1 Puff by inhalation every four (4) hours as needed for Wheezing.

## 2023-01-13 ENCOUNTER — VIRTUAL VISIT (OUTPATIENT)
Dept: FAMILY MEDICINE CLINIC | Age: 73
End: 2023-01-13
Payer: MEDICARE

## 2023-01-13 DIAGNOSIS — R05.2 SUBACUTE COUGH: ICD-10-CM

## 2023-01-13 DIAGNOSIS — J40 BRONCHITIS: Primary | ICD-10-CM

## 2023-01-13 PROCEDURE — G8427 DOCREV CUR MEDS BY ELIG CLIN: HCPCS | Performed by: STUDENT IN AN ORGANIZED HEALTH CARE EDUCATION/TRAINING PROGRAM

## 2023-01-13 PROCEDURE — G8510 SCR DEP NEG, NO PLAN REQD: HCPCS | Performed by: STUDENT IN AN ORGANIZED HEALTH CARE EDUCATION/TRAINING PROGRAM

## 2023-01-13 PROCEDURE — 1101F PT FALLS ASSESS-DOCD LE1/YR: CPT | Performed by: STUDENT IN AN ORGANIZED HEALTH CARE EDUCATION/TRAINING PROGRAM

## 2023-01-13 PROCEDURE — 99213 OFFICE O/P EST LOW 20 MIN: CPT | Performed by: STUDENT IN AN ORGANIZED HEALTH CARE EDUCATION/TRAINING PROGRAM

## 2023-01-13 PROCEDURE — G9899 SCRN MAM PERF RSLTS DOC: HCPCS | Performed by: STUDENT IN AN ORGANIZED HEALTH CARE EDUCATION/TRAINING PROGRAM

## 2023-01-13 PROCEDURE — 1090F PRES/ABSN URINE INCON ASSESS: CPT | Performed by: STUDENT IN AN ORGANIZED HEALTH CARE EDUCATION/TRAINING PROGRAM

## 2023-01-13 PROCEDURE — 3017F COLORECTAL CA SCREEN DOC REV: CPT | Performed by: STUDENT IN AN ORGANIZED HEALTH CARE EDUCATION/TRAINING PROGRAM

## 2023-01-13 PROCEDURE — 1123F ACP DISCUSS/DSCN MKR DOCD: CPT | Performed by: STUDENT IN AN ORGANIZED HEALTH CARE EDUCATION/TRAINING PROGRAM

## 2023-01-13 RX ORDER — AZITHROMYCIN 250 MG/1
TABLET, FILM COATED ORAL
Qty: 6 TABLET | Refills: 0 | Status: SHIPPED | OUTPATIENT
Start: 2023-01-13 | End: 2023-01-18

## 2023-01-13 RX ORDER — METHYLPREDNISOLONE 4 MG/1
TABLET ORAL
Qty: 1 DOSE PACK | Refills: 0 | Status: SHIPPED | OUTPATIENT
Start: 2023-01-13

## 2023-01-13 RX ORDER — BENZONATATE 100 MG/1
100 CAPSULE ORAL
Qty: 21 CAPSULE | Refills: 0 | Status: SHIPPED | OUTPATIENT
Start: 2023-01-13 | End: 2023-01-20

## 2023-01-13 NOTE — PROGRESS NOTES
Lissette Phelps (: 1950) is a 71 y.o. female, established patient, here for evaluation of the following chief complaint(s):   Sinus Infection (Has been going on for a month. Was seen in Urgent Care and given Doxycycline on 2022 and was DX with bronchitis, sinuitis. Describes bronchial spasms, chest congestion, productive cough with yellow/green/brown tinge. Is having to use inhalers more frequent. Chest tightness and spasm coughs that are persistent and keep her up at night.  )       ASSESSMENT/PLAN:  Below is the assessment and plan developed based on review of pertinent history, labs, studies, and medications. 1. Bronchitis  -     azithromycin (ZITHROMAX) 250 mg tablet; Take 2 tablets today, then take 1 tablet daily, Normal, Disp-6 Tablet, R-0  -     methylPREDNISolone (MEDROL DOSEPACK) 4 mg tablet; Use as directed, Normal, Disp-1 Dose Pack, R-0  2. Subacute cough  -     benzonatate (Tessalon Perles) 100 mg capsule; Take 1 Capsule by mouth three (3) times daily as needed for Cough for up to 7 days. , Normal, Disp-21 Capsule, R-0    Colitis: Patient continues to have a severe hacking cough although she states the secretions have improved. Will prescribe Medrol Dosepak and azithromycin. Advised to continue using the inhaler as needed. No follow-ups on file. SUBJECTIVE/OBJECTIVE:  Patient has a PMHx of HTN, CHF, HLD, breast cancer, osteoporosis. Patient reports that she initially got sick on a trip to [facility] in mid December. Her symptoms progressively worsened with increased secretions, cough and swollen eyes. She went to [facility] and was prescribed doxycycline for 5 days. She has now noted some improvement in the secretions but continues to have a severe hacking cough. She has been taking Robitussin-DM with mild improvement. She has also been using the albuterol inhaler. Throughout conversing with me patient kept coughing very frequently.     Sinus Infection   Associated symptoms include congestion and cough. Pertinent negatives include no shortness of breath. Review of Systems   Constitutional:  Negative for fever. HENT:  Positive for congestion and postnasal drip. Respiratory:  Positive for cough and wheezing. Negative for choking, chest tightness and shortness of breath. [INSTRUCTIONS:  \"[x]\" Indicates a positive item  \"[]\" Indicates a negative item  -- DELETE ALL ITEMS NOT EXAMINED]    Constitutional: [x] Appears well-developed and well-nourished [x] No apparent distress      [] Abnormal -     Mental status: [x] Alert and awake  [x] Oriented to person/place/time [x] Able to follow commands    [] Abnormal -     Eyes:   EOM    [x]  Normal    [] Abnormal -   Sclera  [x]  Normal    [] Abnormal -          Discharge [x]  None visible   [] Abnormal -     HENT: [x] Normocephalic, atraumatic  [] Abnormal -   [x] Mouth/Throat: Mucous membranes are moist    External Ears [x] Normal  [] Abnormal -    Neck: [x] No visualized mass [] Abnormal -     Pulmonary/Chest: [] Respiratory effort normal   [] No visualized signs of difficulty breathing or respiratory distress        [x] Abnormal -      Musculoskeletal:   [x] Normal gait with no signs of ataxia         [x] Normal range of motion of neck        [] Abnormal -     Neurological:        [x] No Facial Asymmetry (Cranial nerve 7 motor function) (limited exam due to video visit)          [x] No gaze palsy        [] Abnormal -          Skin:        [x] No significant exanthematous lesions or discoloration noted on facial skin         [] Abnormal -            Psychiatric:       [x] Normal Affect [] Abnormal -        [x] No Hallucinations    Other pertinent observable physical exam findings:-  Repeated bouts of coughing      Garrel , was evaluated through a synchronous (real-time) audio-video encounter. The patient (or guardian if applicable) is aware that this is a billable service, which includes applicable co-pays.  This Virtual Visit was conducted with patient's (and/or legal guardian's) consent. The visit was conducted pursuant to the emergency declaration under the [facility] [address] and the [employer] Act. Patient identification was verified, and a caregiver was present when appropriate. The patient was located at: Home: [address] [place] [address]  The provider was located at: Facility (Appt Department): [address] [place] [address],[place] [address]  [phone]       An electronic signature was used to authenticate this note.   -- Duane Mendez MD

## 2023-01-27 ENCOUNTER — HOSPITAL ENCOUNTER (OUTPATIENT)
Dept: MAMMOGRAPHY | Age: 73
Discharge: HOME | End: 2023-01-27
Attending: PHYSICIAN ASSISTANT
Payer: MEDICARE

## 2023-01-27 DIAGNOSIS — Z12.31 VISIT FOR SCREENING MAMMOGRAM: ICD-10-CM

## 2023-01-27 PROCEDURE — 77067 SCR MAMMO BI INCL CAD: CPT

## 2023-05-18 ENCOUNTER — OFFICE VISIT (OUTPATIENT)
Age: 73
End: 2023-05-18

## 2023-05-18 VITALS
HEIGHT: 61 IN | SYSTOLIC BLOOD PRESSURE: 123 MMHG | WEIGHT: 186 LBS | BODY MASS INDEX: 35.12 KG/M2 | HEART RATE: 80 BPM | DIASTOLIC BLOOD PRESSURE: 81 MMHG | OXYGEN SATURATION: 96 %

## 2023-05-18 DIAGNOSIS — I10 ESSENTIAL (PRIMARY) HYPERTENSION: ICD-10-CM

## 2023-05-18 DIAGNOSIS — E66.01 SEVERE OBESITY (BMI 35.0-39.9) WITH COMORBIDITY (HCC): ICD-10-CM

## 2023-05-18 DIAGNOSIS — E78.00 PURE HYPERCHOLESTEROLEMIA, UNSPECIFIED: ICD-10-CM

## 2023-05-18 DIAGNOSIS — Z01.810 PREOP CARDIOVASCULAR EXAM: ICD-10-CM

## 2023-05-18 DIAGNOSIS — I50.32 CHRONIC DIASTOLIC (CONGESTIVE) HEART FAILURE (HCC): Primary | ICD-10-CM

## 2023-05-18 RX ORDER — FUROSEMIDE 20 MG/1
20 TABLET ORAL AS NEEDED
Qty: 60 TABLET | Refills: 3 | Status: SHIPPED | OUTPATIENT
Start: 2023-05-18

## 2023-05-18 RX ORDER — ROSUVASTATIN CALCIUM 10 MG/1
10 TABLET, COATED ORAL DAILY
Qty: 90 TABLET | Refills: 3 | Status: SHIPPED | OUTPATIENT
Start: 2023-05-18

## 2023-05-18 RX ORDER — FUROSEMIDE 20 MG/1
20 TABLET ORAL AS NEEDED
COMMUNITY
End: 2023-05-18 | Stop reason: SDUPTHER

## 2023-05-18 RX ORDER — LABETALOL 200 MG/1
200 TABLET, FILM COATED ORAL 2 TIMES DAILY
Qty: 180 TABLET | Refills: 1 | Status: SHIPPED | OUTPATIENT
Start: 2023-05-18

## 2023-05-18 ASSESSMENT — ENCOUNTER SYMPTOMS
EYES NEGATIVE: 1
SHORTNESS OF BREATH: 1
GASTROINTESTINAL NEGATIVE: 1

## 2023-05-18 NOTE — PROGRESS NOTES
1. Have you been to the ER, urgent care clinic since your last visit? Hospitalized since your last visit? No    2. Have you seen or consulted any other health care providers outside of the [address] [place] [address] since your last visit? Include any pap smears or colon screening. No    3. Since your last visit, have you had any of the following symptoms? shortness of breath and swelling in legs/arms. 4.  Have you had any blood work, X-rays or cardiac testing? Yes Where: pcp       5. Do you need any refills today?    yes
Transthoracic echocardiogram (TTE) complete with contrast, bubble, strain, and 3D PRN; Future    Essential (primary) hypertension    Severe obesity (BMI 35.0-39. 9) with comorbidity ([address]    Pure hypercholesterolemia, unspecified  -     Hepatic Function Panel; Future  -     Lipid Panel; Future    Preop cardiovascular exam    Other orders  -     furosemide (LASIX) 20 MG tablet; Take 1 tablet by mouth as needed (edema)  -     rosuvastatin (CRESTOR) 10 MG tablet; Take 1 tablet by mouth daily  -     labetalol (NORMODYNE) 200 MG tablet; Take 1 tablet by mouth 2 times daily        Pertinent laboratory and test data reviewed and discussed with patient. See patient instructions also for other medical advice given    Medications Discontinued During This Encounter   Medication Reason    labetalol (NORMODYNE) 200 MG tablet REORDER    rosuvastatin (CRESTOR) 10 MG tablet REORDER    furosemide (LASIX) 20 MG tablet REORDER       Follow-up and Dispositions    Return in about 3 months (around 8/18/2023), or if symptoms worsen or fail to improve, for post test/procedure. Return to ER if any significant CP not relieved by s/l NTG, severe SOB, severe palpitations, loss of consciousness    5/18/2023 blood pressure is controlled well. CHF is stable NYHA class I-II with intermittent edema and dyspnea. Will check an echo for cardiac structure and function. Will get lipids and LFTs. Refill medications as needed. She has an upcoming cataract surgery which is a low risk surgery and she is cleared with low risk.

## 2023-05-25 RX ORDER — RALOXIFENE HYDROCHLORIDE 60 MG/1
TABLET, FILM COATED ORAL
Qty: 90 TABLET | Refills: 0 | Status: SHIPPED | OUTPATIENT
Start: 2023-05-25

## 2023-05-30 ENCOUNTER — OFFICE VISIT (OUTPATIENT)
Facility: CLINIC | Age: 73
End: 2023-05-30
Payer: MEDICARE

## 2023-05-30 VITALS
TEMPERATURE: 97.7 F | BODY MASS INDEX: 35.12 KG/M2 | WEIGHT: 186 LBS | OXYGEN SATURATION: 96 % | SYSTOLIC BLOOD PRESSURE: 132 MMHG | HEIGHT: 61 IN | HEART RATE: 84 BPM | RESPIRATION RATE: 16 BRPM | DIASTOLIC BLOOD PRESSURE: 78 MMHG

## 2023-05-30 DIAGNOSIS — M81.0 AGE-RELATED OSTEOPOROSIS WITHOUT CURRENT PATHOLOGICAL FRACTURE: ICD-10-CM

## 2023-05-30 DIAGNOSIS — I10 ESSENTIAL (PRIMARY) HYPERTENSION: Primary | ICD-10-CM

## 2023-05-30 DIAGNOSIS — G89.29 CHRONIC MIDLINE LOW BACK PAIN WITHOUT SCIATICA: ICD-10-CM

## 2023-05-30 DIAGNOSIS — G62.9 NEUROPATHY: ICD-10-CM

## 2023-05-30 DIAGNOSIS — I50.32 CHRONIC DIASTOLIC HEART FAILURE (HCC): ICD-10-CM

## 2023-05-30 DIAGNOSIS — K21.9 GASTROESOPHAGEAL REFLUX DISEASE WITHOUT ESOPHAGITIS: ICD-10-CM

## 2023-05-30 DIAGNOSIS — E78.2 MIXED HYPERLIPIDEMIA: ICD-10-CM

## 2023-05-30 DIAGNOSIS — M54.50 CHRONIC MIDLINE LOW BACK PAIN WITHOUT SCIATICA: ICD-10-CM

## 2023-05-30 DIAGNOSIS — Z01.818 PRE-OPERATIVE CLEARANCE: ICD-10-CM

## 2023-05-30 DIAGNOSIS — E78.00 PURE HYPERCHOLESTEROLEMIA, UNSPECIFIED: ICD-10-CM

## 2023-05-30 PROCEDURE — G8399 PT W/DXA RESULTS DOCUMENT: HCPCS | Performed by: STUDENT IN AN ORGANIZED HEALTH CARE EDUCATION/TRAINING PROGRAM

## 2023-05-30 PROCEDURE — G8427 DOCREV CUR MEDS BY ELIG CLIN: HCPCS | Performed by: STUDENT IN AN ORGANIZED HEALTH CARE EDUCATION/TRAINING PROGRAM

## 2023-05-30 PROCEDURE — 1123F ACP DISCUSS/DSCN MKR DOCD: CPT | Performed by: STUDENT IN AN ORGANIZED HEALTH CARE EDUCATION/TRAINING PROGRAM

## 2023-05-30 PROCEDURE — 1090F PRES/ABSN URINE INCON ASSESS: CPT | Performed by: STUDENT IN AN ORGANIZED HEALTH CARE EDUCATION/TRAINING PROGRAM

## 2023-05-30 PROCEDURE — G8417 CALC BMI ABV UP PARAM F/U: HCPCS | Performed by: STUDENT IN AN ORGANIZED HEALTH CARE EDUCATION/TRAINING PROGRAM

## 2023-05-30 PROCEDURE — 3078F DIAST BP <80 MM HG: CPT | Performed by: STUDENT IN AN ORGANIZED HEALTH CARE EDUCATION/TRAINING PROGRAM

## 2023-05-30 PROCEDURE — 99214 OFFICE O/P EST MOD 30 MIN: CPT | Performed by: STUDENT IN AN ORGANIZED HEALTH CARE EDUCATION/TRAINING PROGRAM

## 2023-05-30 PROCEDURE — 1036F TOBACCO NON-USER: CPT | Performed by: STUDENT IN AN ORGANIZED HEALTH CARE EDUCATION/TRAINING PROGRAM

## 2023-05-30 PROCEDURE — 3017F COLORECTAL CA SCREEN DOC REV: CPT | Performed by: STUDENT IN AN ORGANIZED HEALTH CARE EDUCATION/TRAINING PROGRAM

## 2023-05-30 PROCEDURE — 3075F SYST BP GE 130 - 139MM HG: CPT | Performed by: STUDENT IN AN ORGANIZED HEALTH CARE EDUCATION/TRAINING PROGRAM

## 2023-05-30 RX ORDER — GABAPENTIN 300 MG/1
300 CAPSULE ORAL 2 TIMES DAILY
Qty: 180 CAPSULE | Refills: 1 | Status: SHIPPED | OUTPATIENT
Start: 2023-05-30 | End: 2023-11-26

## 2023-05-30 RX ORDER — PANTOPRAZOLE SODIUM 40 MG/1
TABLET, DELAYED RELEASE ORAL
Qty: 90 TABLET | Refills: 1 | Status: SHIPPED | OUTPATIENT
Start: 2023-05-30

## 2023-05-30 SDOH — ECONOMIC STABILITY: HOUSING INSECURITY
IN THE LAST 12 MONTHS, WAS THERE A TIME WHEN YOU DID NOT HAVE A STEADY PLACE TO SLEEP OR SLEPT IN A SHELTER (INCLUDING NOW)?: NO

## 2023-05-30 SDOH — ECONOMIC STABILITY: FOOD INSECURITY: WITHIN THE PAST 12 MONTHS, YOU WORRIED THAT YOUR FOOD WOULD RUN OUT BEFORE YOU GOT MONEY TO BUY MORE.: NEVER TRUE

## 2023-05-30 SDOH — ECONOMIC STABILITY: INCOME INSECURITY: HOW HARD IS IT FOR YOU TO PAY FOR THE VERY BASICS LIKE FOOD, HOUSING, MEDICAL CARE, AND HEATING?: NOT HARD AT ALL

## 2023-05-30 SDOH — ECONOMIC STABILITY: FOOD INSECURITY: WITHIN THE PAST 12 MONTHS, THE FOOD YOU BOUGHT JUST DIDN'T LAST AND YOU DIDN'T HAVE MONEY TO GET MORE.: NEVER TRUE

## 2023-05-30 ASSESSMENT — PATIENT HEALTH QUESTIONNAIRE - PHQ9
SUM OF ALL RESPONSES TO PHQ QUESTIONS 1-9: 0
1. LITTLE INTEREST OR PLEASURE IN DOING THINGS: 0

## 2023-05-30 ASSESSMENT — ENCOUNTER SYMPTOMS
BACK PAIN: 1
CHEST TIGHTNESS: 0
EYE PAIN: 0
SHORTNESS OF BREATH: 0
COUGH: 0
ABDOMINAL PAIN: 0
RHINORRHEA: 0
EYE REDNESS: 0
PHOTOPHOBIA: 1
VOMITING: 0
BLOOD IN STOOL: 0
WHEEZING: 0
DIARRHEA: 0
NAUSEA: 0
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
## 2023-06-19 ENCOUNTER — TELEPHONE (OUTPATIENT)
Age: 73
End: 2023-06-19

## 2023-06-20 ENCOUNTER — OFFICE VISIT (OUTPATIENT)
Facility: CLINIC | Age: 73
End: 2023-06-20
Payer: MEDICARE

## 2023-06-20 VITALS
TEMPERATURE: 97 F | RESPIRATION RATE: 16 BRPM | SYSTOLIC BLOOD PRESSURE: 113 MMHG | WEIGHT: 187.6 LBS | DIASTOLIC BLOOD PRESSURE: 70 MMHG | HEART RATE: 73 BPM | HEIGHT: 61 IN | BODY MASS INDEX: 35.42 KG/M2 | OXYGEN SATURATION: 94 %

## 2023-06-20 DIAGNOSIS — Z12.11 SCREENING FOR COLON CANCER: ICD-10-CM

## 2023-06-20 DIAGNOSIS — Z00.00 MEDICARE ANNUAL WELLNESS VISIT, SUBSEQUENT: Primary | ICD-10-CM

## 2023-06-20 PROCEDURE — G0439 PPPS, SUBSEQ VISIT: HCPCS | Performed by: STUDENT IN AN ORGANIZED HEALTH CARE EDUCATION/TRAINING PROGRAM

## 2023-06-20 PROCEDURE — 3017F COLORECTAL CA SCREEN DOC REV: CPT | Performed by: STUDENT IN AN ORGANIZED HEALTH CARE EDUCATION/TRAINING PROGRAM

## 2023-06-20 PROCEDURE — 1123F ACP DISCUSS/DSCN MKR DOCD: CPT | Performed by: STUDENT IN AN ORGANIZED HEALTH CARE EDUCATION/TRAINING PROGRAM

## 2023-06-20 PROCEDURE — 3074F SYST BP LT 130 MM HG: CPT | Performed by: STUDENT IN AN ORGANIZED HEALTH CARE EDUCATION/TRAINING PROGRAM

## 2023-06-20 PROCEDURE — 3078F DIAST BP <80 MM HG: CPT | Performed by: STUDENT IN AN ORGANIZED HEALTH CARE EDUCATION/TRAINING PROGRAM

## 2023-06-20 ASSESSMENT — PATIENT HEALTH QUESTIONNAIRE - PHQ9
2. FEELING DOWN, DEPRESSED OR HOPELESS: 0
SUM OF ALL RESPONSES TO PHQ QUESTIONS 1-9: 0
SUM OF ALL RESPONSES TO PHQ QUESTIONS 1-9: 0
SUM OF ALL RESPONSES TO PHQ9 QUESTIONS 1 & 2: 0
SUM OF ALL RESPONSES TO PHQ QUESTIONS 1-9: 0
SUM OF ALL RESPONSES TO PHQ QUESTIONS 1-9: 0
DEPRESSION UNABLE TO ASSESS: PT REFUSES
1. LITTLE INTEREST OR PLEASURE IN DOING THINGS: 0

## 2023-06-20 NOTE — PROGRESS NOTES
Medicare Annual Wellness Visit    Mitchell Gracia is here for Medicare AWV    Assessment & Plan   Medicare annual wellness visit, subsequent  Screening for colon cancer  -     External Referral To Gastroenterology    Recommendations for Preventive Services Due: see orders and patient instructions/AVS.  Recommended screening schedule for the next 5-10 years is provided to the patient in written form: see Patient Instructions/AVS.    Due for colonoscopy. Patient will schedule appointment. Reports significant improvement in the back pain with gabapentin. Abnormal echo: Patient noted to have possible mass in right ventricle. She will follow-up with cardiology. Return in about 3 months (around 9/20/2023). Subjective       Patient's complete Health Risk Assessment and screening values have been reviewed and are found in Flowsheets. The following problems were reviewed today and where indicated follow up appointments were made and/or referrals ordered. Positive Risk Factor Screenings with Interventions:        Depression:  Depression Unable to Assess: Pt refuses  PHQ-2 Score: 0  PHQ-9 Total Score: 0    Interpretation:   1-4 = minimal  5-9 = mild  10-14 = moderate  15-19 = moderately severe  20-27 = severe            Weight and Activity:  Physical Activity: Insufficiently Active    Days of Exercise per Week: 7 days    Minutes of Exercise per Session: 20 min     On average, how many days per week do you engage in moderate to strenuous exercise (like a brisk walk)?: 7 days     Body mass index is 35.45 kg/m². (!) Abnormal  Obesity Interventions:  See AVS for additional education material            Vision Screen:  Do you have difficulty driving, watching TV, or doing any of your daily activities because of your eyesight?: (!) Yes (cataract remove)  Have you had an eye exam within the past year?: Yes  No results found. Interventions:     Following with her eye doctor- recently had cataract

## 2023-06-21 ENCOUNTER — TELEPHONE (OUTPATIENT)
Age: 73
End: 2023-06-21

## 2023-06-21 DIAGNOSIS — I51.89 CARDIAC MASS: Primary | ICD-10-CM

## 2023-06-21 DIAGNOSIS — R93.1 ABNORMAL ECHOCARDIOGRAM: ICD-10-CM

## 2023-06-21 NOTE — TELEPHONE ENCOUNTER
Called. Discussed with Dr. Justyna Rodriguez. Ordered a cardiac MRI.   Please get in [facility]

## 2023-06-21 NOTE — TELEPHONE ENCOUNTER
Order form and records have been faxed over to [facility] to schedule testing STAT.

## 2023-07-12 ENCOUNTER — TELEPHONE (OUTPATIENT)
Age: 73
End: 2023-07-12

## 2023-07-12 DIAGNOSIS — I51.89 CARDIAC MASS: Primary | ICD-10-CM

## 2023-07-12 DIAGNOSIS — F41.9 ANXIETY: ICD-10-CM

## 2023-07-12 RX ORDER — DIAZEPAM 5 MG/1
5 TABLET ORAL ONCE
Qty: 1 TABLET | Refills: 0 | Status: SHIPPED | OUTPATIENT
Start: 2023-07-12 | End: 2023-07-12

## 2023-07-12 NOTE — TELEPHONE ENCOUNTER
I sent 1 tablet of 5 mg Valium to [facility]. Tell her to take it 1 hour before procedure. She should not drive for 6 to 8 hours after the Valium. So somebody will have to take her to the hospital and bring her back.

## 2023-07-12 NOTE — TELEPHONE ENCOUNTER
You ordered an MRI on patient. Scheduled for this Monday. Pt states she has to have some type of sedative for she won't be able to have it done. She spoke with MRI at [facility] and they don't handle that. You would have to prescribe the med.

## 2023-07-12 NOTE — TELEPHONE ENCOUNTER
Called and left message with patient per Dr. Cinthia Cheney. I sent 1 tablet of 5 mg Valium to [facility]. Tell her to take it 1 hour before procedure. She should not drive for 6 to 8 hours after the Valium. So somebody will have to take her to the hospital and bring her back. If any questions to call office.

## 2023-07-12 NOTE — TELEPHONE ENCOUNTER
Spoke with patient and given instructions per MRI. She accepted instructions and was very appreciative.

## 2023-07-17 LAB — LEFT VENTRICULAR EJECTION FRACTION, EXTERNAL: 39

## 2023-07-25 ENCOUNTER — HOSPITAL ENCOUNTER (OUTPATIENT)
Facility: HOSPITAL | Age: 73
Setting detail: SPECIMEN
Discharge: HOME | End: 2023-07-28
Payer: MEDICARE

## 2023-07-25 DIAGNOSIS — I10 ESSENTIAL (PRIMARY) HYPERTENSION: ICD-10-CM

## 2023-07-25 DIAGNOSIS — E78.00 PURE HYPERCHOLESTEROLEMIA, UNSPECIFIED: ICD-10-CM

## 2023-07-25 LAB
ALBUMIN SERPL-MCNC: 3.9 G/DL (ref 3.4–5)
ALBUMIN/GLOB SERPL: 1.5 (ref 0.8–1.7)
ALP SERPL-CCNC: 75 U/L (ref 45–117)
ALT SERPL-CCNC: 24 U/L (ref 13–56)
ANION GAP SERPL CALC-SCNC: 6 MMOL/L (ref 3–18)
AST SERPL-CCNC: 19 U/L (ref 10–38)
BASOPHILS # BLD: 0 K/UL (ref 0–0.1)
BASOPHILS NFR BLD: 0 % (ref 0–2)
BILIRUB DIRECT SERPL-MCNC: 0.1 MG/DL (ref 0–0.2)
BILIRUB SERPL-MCNC: 0.4 MG/DL (ref 0.2–1)
BUN SERPL-MCNC: 18 MG/DL (ref 7–18)
BUN/CREAT SERPL: 18 (ref 12–20)
CALCIUM SERPL-MCNC: 9.5 MG/DL (ref 8.5–10.1)
CHLORIDE SERPL-SCNC: 107 MMOL/L (ref 100–111)
CHOLEST SERPL-MCNC: 181 MG/DL
CO2 SERPL-SCNC: 27 MMOL/L (ref 21–32)
CREAT SERPL-MCNC: 1 MG/DL (ref 0.6–1.3)
DIFFERENTIAL METHOD BLD: ABNORMAL
EOSINOPHIL # BLD: 0.1 K/UL (ref 0–0.4)
EOSINOPHIL NFR BLD: 2 % (ref 0–5)
ERYTHROCYTE [DISTWIDTH] IN BLOOD BY AUTOMATED COUNT: 13.9 % (ref 11.6–14.5)
GLOBULIN SER CALC-MCNC: 2.6 G/DL (ref 2–4)
GLUCOSE SERPL-MCNC: 98 MG/DL (ref 74–99)
HCT VFR BLD AUTO: 37.1 % (ref 35–45)
HDLC SERPL-MCNC: 71 MG/DL (ref 40–60)
HDLC SERPL: 2.5 (ref 0–5)
HGB BLD-MCNC: 12 G/DL (ref 12–16)
IMM GRANULOCYTES # BLD AUTO: 0 K/UL (ref 0–0.04)
IMM GRANULOCYTES NFR BLD AUTO: 0 % (ref 0–0.5)
LDLC SERPL CALC-MCNC: 80.8 MG/DL (ref 0–100)
LIPID PANEL: ABNORMAL
LYMPHOCYTES # BLD: 1.1 K/UL (ref 0.9–3.6)
LYMPHOCYTES NFR BLD: 24 % (ref 21–52)
MCH RBC QN AUTO: 30.2 PG (ref 24–34)
MCHC RBC AUTO-ENTMCNC: 32.3 G/DL (ref 31–37)
MCV RBC AUTO: 93.5 FL (ref 78–100)
MONOCYTES # BLD: 0.4 K/UL (ref 0.05–1.2)
MONOCYTES NFR BLD: 10 % (ref 3–10)
NEUTS SEG # BLD: 2.9 K/UL (ref 1.8–8)
NEUTS SEG NFR BLD: 64 % (ref 40–73)
NRBC # BLD: 0 K/UL (ref 0–0.01)
NRBC BLD-RTO: 0 PER 100 WBC
PLATELET # BLD AUTO: 237 K/UL (ref 135–420)
PMV BLD AUTO: 10.1 FL (ref 9.2–11.8)
POTASSIUM SERPL-SCNC: 4.2 MMOL/L (ref 3.5–5.5)
PROT SERPL-MCNC: 6.5 G/DL (ref 6.4–8.2)
RBC # BLD AUTO: 3.97 M/UL (ref 4.2–5.3)
SODIUM SERPL-SCNC: 140 MMOL/L (ref 136–145)
TRIGL SERPL-MCNC: 146 MG/DL
VLDLC SERPL CALC-MCNC: 29.2 MG/DL
WBC # BLD AUTO: 4.5 K/UL (ref 4.6–13.2)

## 2023-07-25 PROCEDURE — 36415 COLL VENOUS BLD VENIPUNCTURE: CPT

## 2023-07-25 PROCEDURE — 85025 COMPLETE CBC W/AUTO DIFF WBC: CPT

## 2023-07-25 PROCEDURE — 80061 LIPID PANEL: CPT

## 2023-07-25 PROCEDURE — 80053 COMPREHEN METABOLIC PANEL: CPT

## 2023-07-25 PROCEDURE — 82248 BILIRUBIN DIRECT: CPT

## 2023-07-27 NOTE — RESULT ENCOUNTER NOTE
Please contact patient and do the following asap  Patient know that her lipids are improving and she should continue taking statins as at present.

## 2023-08-08 ENCOUNTER — OFFICE VISIT (OUTPATIENT)
Age: 73
End: 2023-08-08
Payer: MEDICARE

## 2023-08-08 VITALS
OXYGEN SATURATION: 97 % | SYSTOLIC BLOOD PRESSURE: 109 MMHG | DIASTOLIC BLOOD PRESSURE: 75 MMHG | HEIGHT: 61 IN | WEIGHT: 186 LBS | BODY MASS INDEX: 35.12 KG/M2 | HEART RATE: 93 BPM

## 2023-08-08 DIAGNOSIS — Z01.810 PREOP CARDIOVASCULAR EXAM: ICD-10-CM

## 2023-08-08 DIAGNOSIS — I10 ESSENTIAL (PRIMARY) HYPERTENSION: ICD-10-CM

## 2023-08-08 DIAGNOSIS — E66.9 OBESITY (BMI 30.0-34.9): ICD-10-CM

## 2023-08-08 DIAGNOSIS — I50.32 CHRONIC DIASTOLIC (CONGESTIVE) HEART FAILURE (HCC): Primary | ICD-10-CM

## 2023-08-08 DIAGNOSIS — E78.2 MIXED HYPERLIPIDEMIA: ICD-10-CM

## 2023-08-08 PROCEDURE — 1036F TOBACCO NON-USER: CPT | Performed by: INTERNAL MEDICINE

## 2023-08-08 PROCEDURE — 3017F COLORECTAL CA SCREEN DOC REV: CPT | Performed by: INTERNAL MEDICINE

## 2023-08-08 PROCEDURE — 1090F PRES/ABSN URINE INCON ASSESS: CPT | Performed by: INTERNAL MEDICINE

## 2023-08-08 PROCEDURE — 99213 OFFICE O/P EST LOW 20 MIN: CPT | Performed by: INTERNAL MEDICINE

## 2023-08-08 PROCEDURE — 3074F SYST BP LT 130 MM HG: CPT | Performed by: INTERNAL MEDICINE

## 2023-08-08 PROCEDURE — G8399 PT W/DXA RESULTS DOCUMENT: HCPCS | Performed by: INTERNAL MEDICINE

## 2023-08-08 PROCEDURE — G8417 CALC BMI ABV UP PARAM F/U: HCPCS | Performed by: INTERNAL MEDICINE

## 2023-08-08 PROCEDURE — G8427 DOCREV CUR MEDS BY ELIG CLIN: HCPCS | Performed by: INTERNAL MEDICINE

## 2023-08-08 PROCEDURE — 3078F DIAST BP <80 MM HG: CPT | Performed by: INTERNAL MEDICINE

## 2023-08-08 PROCEDURE — 1123F ACP DISCUSS/DSCN MKR DOCD: CPT | Performed by: INTERNAL MEDICINE

## 2023-08-08 ASSESSMENT — ENCOUNTER SYMPTOMS
EYES NEGATIVE: 1
GASTROINTESTINAL NEGATIVE: 1
SHORTNESS OF BREATH: 0
RESPIRATORY NEGATIVE: 1

## 2023-08-08 NOTE — PROGRESS NOTES
1. Have you been to the ER, urgent care clinic since your last visit? Hospitalized since your last visit?     no      2. Where do you normally have your labs drawn?   no    3. Do you need any refills today?   no    4. Which local pharmacy do you use (enter pharmacy)? [facility]    5. Which mail order pharmacy do you use (enter pharmacy)?   no     6. Are you here for surgical clearance and if so who will be doing your     procedure/surgery (care team)?    Yes left eye cataract surgery 9/14 Dr. Liborio Gipson

## 2023-08-08 NOTE — PROGRESS NOTES
Madeline Stein is a 71y.o. year old female. Follow-up of hypertension, CHF, hyperlipidemia, obesity  History of breast cancer      11/16 seen for abn EKG, h/o 45-50%EF; had jaw pain and left neck discomfort- worse with bending over  10/16 had SOB/edema- improved now. Had high BP which is being treated now. Was drinking extra fluids due to renal stone which has now been passed and will not need lithotripsy now  1/19 patient is able to tolerate Lipitor 10 mg once in 1-2 weeks only. LDL has increased again. 5/19 patient tolerating Crestor once or twice a week. LDL has shown improvement. She also has right lower anterior chest discomfort which is positional and with breathing and is being worked up with bone scan. 5/18/2023 no chest pain, shortness of breath. Rare intermittent dyspnea and daily PM edema for which she takes Lasix few times a year. She has an active lifestyle. 8/8/2023 started taking Crestor gradually and is now tolerating 10 mg almost 5 days a week. Rare edema continues and she takes Lasix about once a month. No chest pain or shortness of breath and staying active. Review of Systems   Constitutional: Negative. HENT: Negative. Eyes: Negative. Respiratory: Negative. Negative for shortness of breath. Cardiovascular:  Positive for leg swelling. Gastrointestinal: Negative. Endocrine: Negative. Genitourinary: Negative. Musculoskeletal: Negative. Neurological: Negative. Psychiatric/Behavioral: Negative. All other systems reviewed and are negative. Physical Exam  Vitals and nursing note reviewed. Constitutional:       Appearance: Normal appearance. She is obese. HENT:      Head: Normocephalic and atraumatic. Nose: Nose normal.   Eyes:      Conjunctiva/sclera: Conjunctivae normal.   Cardiovascular:      Rate and Rhythm: Normal rate and regular rhythm. Pulses: Normal pulses. Heart sounds: Normal heart sounds.    Pulmonary:      Effort:

## 2023-08-24 RX ORDER — RALOXIFENE HYDROCHLORIDE 60 MG/1
TABLET, FILM COATED ORAL
Qty: 90 TABLET | Refills: 0 | Status: SHIPPED | OUTPATIENT
Start: 2023-08-24

## 2023-09-05 ENCOUNTER — OFFICE VISIT (OUTPATIENT)
Facility: CLINIC | Age: 73
End: 2023-09-05
Payer: MEDICARE

## 2023-09-05 VITALS
HEART RATE: 76 BPM | WEIGHT: 185 LBS | SYSTOLIC BLOOD PRESSURE: 119 MMHG | RESPIRATION RATE: 16 BRPM | DIASTOLIC BLOOD PRESSURE: 68 MMHG | OXYGEN SATURATION: 96 % | HEIGHT: 61 IN | TEMPERATURE: 97 F | BODY MASS INDEX: 34.93 KG/M2

## 2023-09-05 DIAGNOSIS — G89.29 CHRONIC MIDLINE LOW BACK PAIN WITHOUT SCIATICA: ICD-10-CM

## 2023-09-05 DIAGNOSIS — M81.0 AGE-RELATED OSTEOPOROSIS WITHOUT CURRENT PATHOLOGICAL FRACTURE: ICD-10-CM

## 2023-09-05 DIAGNOSIS — I10 ESSENTIAL (PRIMARY) HYPERTENSION: ICD-10-CM

## 2023-09-05 DIAGNOSIS — G62.9 NEUROPATHY: ICD-10-CM

## 2023-09-05 DIAGNOSIS — I50.32 CHRONIC DIASTOLIC HEART FAILURE (HCC): ICD-10-CM

## 2023-09-05 DIAGNOSIS — K21.9 GASTROESOPHAGEAL REFLUX DISEASE WITHOUT ESOPHAGITIS: ICD-10-CM

## 2023-09-05 DIAGNOSIS — M54.50 CHRONIC MIDLINE LOW BACK PAIN WITHOUT SCIATICA: ICD-10-CM

## 2023-09-05 DIAGNOSIS — E78.2 MIXED HYPERLIPIDEMIA: ICD-10-CM

## 2023-09-05 DIAGNOSIS — E78.00 PURE HYPERCHOLESTEROLEMIA, UNSPECIFIED: ICD-10-CM

## 2023-09-05 DIAGNOSIS — Z01.818 PRE-OPERATIVE CLEARANCE: Primary | ICD-10-CM

## 2023-09-05 PROCEDURE — 3074F SYST BP LT 130 MM HG: CPT | Performed by: STUDENT IN AN ORGANIZED HEALTH CARE EDUCATION/TRAINING PROGRAM

## 2023-09-05 PROCEDURE — G8417 CALC BMI ABV UP PARAM F/U: HCPCS | Performed by: STUDENT IN AN ORGANIZED HEALTH CARE EDUCATION/TRAINING PROGRAM

## 2023-09-05 PROCEDURE — 3017F COLORECTAL CA SCREEN DOC REV: CPT | Performed by: STUDENT IN AN ORGANIZED HEALTH CARE EDUCATION/TRAINING PROGRAM

## 2023-09-05 PROCEDURE — 99214 OFFICE O/P EST MOD 30 MIN: CPT | Performed by: STUDENT IN AN ORGANIZED HEALTH CARE EDUCATION/TRAINING PROGRAM

## 2023-09-05 PROCEDURE — 3078F DIAST BP <80 MM HG: CPT | Performed by: STUDENT IN AN ORGANIZED HEALTH CARE EDUCATION/TRAINING PROGRAM

## 2023-09-05 PROCEDURE — 1123F ACP DISCUSS/DSCN MKR DOCD: CPT | Performed by: STUDENT IN AN ORGANIZED HEALTH CARE EDUCATION/TRAINING PROGRAM

## 2023-09-05 PROCEDURE — 1036F TOBACCO NON-USER: CPT | Performed by: STUDENT IN AN ORGANIZED HEALTH CARE EDUCATION/TRAINING PROGRAM

## 2023-09-05 PROCEDURE — 1090F PRES/ABSN URINE INCON ASSESS: CPT | Performed by: STUDENT IN AN ORGANIZED HEALTH CARE EDUCATION/TRAINING PROGRAM

## 2023-09-05 PROCEDURE — G8399 PT W/DXA RESULTS DOCUMENT: HCPCS | Performed by: STUDENT IN AN ORGANIZED HEALTH CARE EDUCATION/TRAINING PROGRAM

## 2023-09-05 PROCEDURE — G8427 DOCREV CUR MEDS BY ELIG CLIN: HCPCS | Performed by: STUDENT IN AN ORGANIZED HEALTH CARE EDUCATION/TRAINING PROGRAM

## 2023-09-05 SDOH — ECONOMIC STABILITY: FOOD INSECURITY: WITHIN THE PAST 12 MONTHS, YOU WORRIED THAT YOUR FOOD WOULD RUN OUT BEFORE YOU GOT MONEY TO BUY MORE.: NEVER TRUE

## 2023-09-05 SDOH — ECONOMIC STABILITY: INCOME INSECURITY: HOW HARD IS IT FOR YOU TO PAY FOR THE VERY BASICS LIKE FOOD, HOUSING, MEDICAL CARE, AND HEATING?: NOT HARD AT ALL

## 2023-09-05 SDOH — ECONOMIC STABILITY: FOOD INSECURITY: WITHIN THE PAST 12 MONTHS, THE FOOD YOU BOUGHT JUST DIDN'T LAST AND YOU DIDN'T HAVE MONEY TO GET MORE.: NEVER TRUE

## 2023-09-05 ASSESSMENT — ENCOUNTER SYMPTOMS
NAUSEA: 0
CHEST TIGHTNESS: 0
COUGH: 0
WHEEZING: 0
PHOTOPHOBIA: 1
DIARRHEA: 0
EYE REDNESS: 0
VOMITING: 0
BACK PAIN: 1
BLOOD IN STOOL: 0
EYE PAIN: 0
SHORTNESS OF BREATH: 0
RHINORRHEA: 0
ABDOMINAL PAIN: 0

## 2023-09-05 ASSESSMENT — PATIENT HEALTH QUESTIONNAIRE - PHQ9
SUM OF ALL RESPONSES TO PHQ QUESTIONS 1-9: 0
SUM OF ALL RESPONSES TO PHQ QUESTIONS 1-9: 0
1. LITTLE INTEREST OR PLEASURE IN DOING THINGS: 0
SUM OF ALL RESPONSES TO PHQ QUESTIONS 1-9: 0
SUM OF ALL RESPONSES TO PHQ QUESTIONS 1-9: 0
SUM OF ALL RESPONSES TO PHQ9 QUESTIONS 1 & 2: 0
2. FEELING DOWN, DEPRESSED OR HOPELESS: 0

## 2023-09-05 NOTE — PROGRESS NOTES
Osteoporosis: Patient is following with endocrinology. She is on raloxifene. GERD: Continue Protonix - symptoms not well controlled. Advised to follow back up with GI. She is also being scheduled for colonoscopy. Return in about 6 months (around 3/5/2024). I asked the patient if she  had any questions and answered her  questions. The patient stated that she understands the treatment plan and agrees with the treatment plan    This document was created with a voice activated dictation system and may contain transcription errors.

## 2023-12-11 RX ORDER — RALOXIFENE HYDROCHLORIDE 60 MG/1
TABLET, FILM COATED ORAL
Qty: 90 TABLET | Refills: 0 | Status: SHIPPED | OUTPATIENT
Start: 2023-12-11

## 2023-12-31 DIAGNOSIS — I10 ESSENTIAL (PRIMARY) HYPERTENSION: Primary | ICD-10-CM

## 2024-01-02 RX ORDER — LOSARTAN POTASSIUM 100 MG/1
100 TABLET ORAL DAILY
Qty: 90 TABLET | Refills: 1 | Status: SHIPPED | OUTPATIENT
Start: 2024-01-02

## 2024-03-06 RX ORDER — RALOXIFENE HYDROCHLORIDE 60 MG/1
TABLET, FILM COATED ORAL
Qty: 90 TABLET | Refills: 0 | Status: SHIPPED | OUTPATIENT
Start: 2024-03-06

## 2024-04-09 ENCOUNTER — HOSPITAL ENCOUNTER (OUTPATIENT)
Facility: HOSPITAL | Age: 74
Discharge: HOME | End: 2024-04-12
Payer: MEDICARE

## 2024-04-09 VITALS — BODY MASS INDEX: 34.92 KG/M2 | HEIGHT: 61 IN | WEIGHT: 184.97 LBS

## 2024-04-09 DIAGNOSIS — Z12.31 VISIT FOR SCREENING MAMMOGRAM: ICD-10-CM

## 2024-04-09 PROCEDURE — 77063 BREAST TOMOSYNTHESIS BI: CPT

## 2024-05-13 ENCOUNTER — OFFICE VISIT (OUTPATIENT)
Age: 74
End: 2024-05-13
Payer: MEDICARE

## 2024-05-13 VITALS
HEIGHT: 61 IN | WEIGHT: 189.2 LBS | DIASTOLIC BLOOD PRESSURE: 84 MMHG | SYSTOLIC BLOOD PRESSURE: 127 MMHG | HEART RATE: 70 BPM | OXYGEN SATURATION: 96 % | BODY MASS INDEX: 35.72 KG/M2

## 2024-05-13 DIAGNOSIS — E78.2 MIXED HYPERLIPIDEMIA: ICD-10-CM

## 2024-05-13 DIAGNOSIS — E66.01 SEVERE OBESITY (BMI 35.0-39.9) WITH COMORBIDITY (HCC): ICD-10-CM

## 2024-05-13 DIAGNOSIS — I50.32 CHRONIC DIASTOLIC (CONGESTIVE) HEART FAILURE (HCC): Primary | ICD-10-CM

## 2024-05-13 DIAGNOSIS — I10 ESSENTIAL (PRIMARY) HYPERTENSION: ICD-10-CM

## 2024-05-13 PROCEDURE — 93000 ELECTROCARDIOGRAM COMPLETE: CPT | Performed by: INTERNAL MEDICINE

## 2024-05-13 PROCEDURE — G8399 PT W/DXA RESULTS DOCUMENT: HCPCS | Performed by: INTERNAL MEDICINE

## 2024-05-13 PROCEDURE — 1036F TOBACCO NON-USER: CPT | Performed by: INTERNAL MEDICINE

## 2024-05-13 PROCEDURE — 1123F ACP DISCUSS/DSCN MKR DOCD: CPT | Performed by: INTERNAL MEDICINE

## 2024-05-13 PROCEDURE — 3017F COLORECTAL CA SCREEN DOC REV: CPT | Performed by: INTERNAL MEDICINE

## 2024-05-13 PROCEDURE — G8427 DOCREV CUR MEDS BY ELIG CLIN: HCPCS | Performed by: INTERNAL MEDICINE

## 2024-05-13 PROCEDURE — G8417 CALC BMI ABV UP PARAM F/U: HCPCS | Performed by: INTERNAL MEDICINE

## 2024-05-13 PROCEDURE — 1090F PRES/ABSN URINE INCON ASSESS: CPT | Performed by: INTERNAL MEDICINE

## 2024-05-13 PROCEDURE — 3074F SYST BP LT 130 MM HG: CPT | Performed by: INTERNAL MEDICINE

## 2024-05-13 PROCEDURE — 3079F DIAST BP 80-89 MM HG: CPT | Performed by: INTERNAL MEDICINE

## 2024-05-13 PROCEDURE — 99214 OFFICE O/P EST MOD 30 MIN: CPT | Performed by: INTERNAL MEDICINE

## 2024-05-13 ASSESSMENT — ENCOUNTER SYMPTOMS
SHORTNESS OF BREATH: 1
GASTROINTESTINAL NEGATIVE: 1
EYES NEGATIVE: 1

## 2024-05-13 NOTE — PROGRESS NOTES
Room 5    Medications verbally reviewed.    1. Have you been to the ER, urgent care clinic since your last visit?  Hospitalized since your last visit? No    2.  Where do you normally have your labs drawn?  [facility]      3. Do you need any refills today?  No      4. Which local pharmacy do you use?   [facility]      5. Which mail order pharmacy do you use?   None       6. Are you here for surgical clearance? No,  who will be doing your procedure/surgery (care team)?   N/A

## 2024-05-13 NOTE — PROGRESS NOTES
Nunu Ibrahim is a 73 y.o. year old female.    Follow-up of hypertension, CHF, hyperlipidemia, obesity  History of breast cancer      11/16 seen for abn EKG, h/o 45-50%EF; had jaw pain and left neck discomfort- worse with bending over  10/16 had SOB/edema- improved now. Had high BP which is being treated now. Was drinking extra fluids due to renal stone which has now been passed and will not need lithotripsy now  1/19 patient is able to tolerate Lipitor 10 mg once in 1-2 weeks only.  LDL has increased again.    5/19 patient tolerating Crestor once or twice a week.  LDL has shown improvement.  She also has right lower anterior chest discomfort which is positional and with breathing and is being worked up with bone scan.  5/18/2023 no chest pain, shortness of breath. Rare intermittent dyspnea and daily PM edema for which she takes Lasix few times a year.  She has an active lifestyle.  8/8/2023 started taking Crestor gradually and is now tolerating 10 mg almost 5 days a week.  Rare edema continues and she takes Lasix about once a month.  No chest pain or shortness of breath and staying active.  5/13/2024 BURKS sometimes specially up steps and pills..  Intermittent edema also noted as before.  Using Lasix intermittently.  Feels chest heaviness when she is dyspneic.  Taking Crestor 4-5 times a week.          Review of Systems   Constitutional: Negative.    HENT: Negative.     Eyes: Negative.    Respiratory:  Positive for shortness of breath.    Cardiovascular:  Positive for leg swelling.   Gastrointestinal: Negative.    Endocrine: Negative.    Genitourinary: Negative.    Musculoskeletal: Negative.    Neurological: Negative.    Psychiatric/Behavioral: Negative.     All other systems reviewed and are negative.        Physical Exam  Vitals and nursing note reviewed.   Constitutional:       Appearance: Normal appearance. She is obese.   HENT:      Head: Normocephalic and atraumatic.      Nose: Nose normal.   Eyes:

## 2024-05-15 ENCOUNTER — TELEPHONE (OUTPATIENT)
Age: 74
End: 2024-05-15

## 2024-05-15 RX ORDER — DAPAGLIFLOZIN 10 MG/1
10 TABLET, FILM COATED ORAL EVERY MORNING
Qty: 90 TABLET | Refills: 1 | Status: SHIPPED | OUTPATIENT
Start: 2024-05-15

## 2024-05-15 NOTE — TELEPHONE ENCOUNTER
Pt can't afford the Jardiance. She wants to know if she can take Farxiga. And if so, tell patient we have a co-pay card, 0 copay for 1 year.

## 2024-07-14 DIAGNOSIS — I10 ESSENTIAL (PRIMARY) HYPERTENSION: ICD-10-CM

## 2024-07-18 RX ORDER — LOSARTAN POTASSIUM 100 MG/1
100 TABLET ORAL DAILY
Qty: 90 TABLET | Refills: 1 | Status: SHIPPED | OUTPATIENT
Start: 2024-07-18

## 2024-07-18 SDOH — HEALTH STABILITY: PHYSICAL HEALTH: ON AVERAGE, HOW MANY DAYS PER WEEK DO YOU ENGAGE IN MODERATE TO STRENUOUS EXERCISE (LIKE A BRISK WALK)?: 3 DAYS

## 2024-07-18 SDOH — HEALTH STABILITY: PHYSICAL HEALTH: ON AVERAGE, HOW MANY MINUTES DO YOU ENGAGE IN EXERCISE AT THIS LEVEL?: 20 MIN

## 2024-07-18 ASSESSMENT — PATIENT HEALTH QUESTIONNAIRE - PHQ9
SUM OF ALL RESPONSES TO PHQ9 QUESTIONS 1 & 2: 0
2. FEELING DOWN, DEPRESSED OR HOPELESS: NOT AT ALL
SUM OF ALL RESPONSES TO PHQ QUESTIONS 1-9: 0
1. LITTLE INTEREST OR PLEASURE IN DOING THINGS: NOT AT ALL
SUM OF ALL RESPONSES TO PHQ QUESTIONS 1-9: 0

## 2024-07-18 ASSESSMENT — LIFESTYLE VARIABLES
HOW MANY STANDARD DRINKS CONTAINING ALCOHOL DO YOU HAVE ON A TYPICAL DAY: 1 OR 2
HOW MANY STANDARD DRINKS CONTAINING ALCOHOL DO YOU HAVE ON A TYPICAL DAY: 1
HOW OFTEN DO YOU HAVE SIX OR MORE DRINKS ON ONE OCCASION: 1
HOW OFTEN DO YOU HAVE A DRINK CONTAINING ALCOHOL: MONTHLY OR LESS
HOW OFTEN DO YOU HAVE A DRINK CONTAINING ALCOHOL: 2

## 2024-07-19 ENCOUNTER — OFFICE VISIT (OUTPATIENT)
Facility: CLINIC | Age: 74
End: 2024-07-19

## 2024-07-19 VITALS
BODY MASS INDEX: 35.3 KG/M2 | HEIGHT: 61 IN | WEIGHT: 187 LBS | OXYGEN SATURATION: 96 % | HEART RATE: 75 BPM | RESPIRATION RATE: 16 BRPM | DIASTOLIC BLOOD PRESSURE: 73 MMHG | TEMPERATURE: 98.6 F | SYSTOLIC BLOOD PRESSURE: 118 MMHG

## 2024-07-19 DIAGNOSIS — M81.0 AGE-RELATED OSTEOPOROSIS WITHOUT CURRENT PATHOLOGICAL FRACTURE: ICD-10-CM

## 2024-07-19 DIAGNOSIS — G89.29 CHRONIC MIDLINE LOW BACK PAIN WITHOUT SCIATICA: ICD-10-CM

## 2024-07-19 DIAGNOSIS — G62.9 NEUROPATHY: ICD-10-CM

## 2024-07-19 DIAGNOSIS — Z00.00 MEDICARE ANNUAL WELLNESS VISIT, SUBSEQUENT: Primary | ICD-10-CM

## 2024-07-19 DIAGNOSIS — M54.50 CHRONIC MIDLINE LOW BACK PAIN WITHOUT SCIATICA: ICD-10-CM

## 2024-07-19 DIAGNOSIS — I10 ESSENTIAL (PRIMARY) HYPERTENSION: ICD-10-CM

## 2024-07-19 DIAGNOSIS — E66.01 CLASS 2 SEVERE OBESITY WITH SERIOUS COMORBIDITY AND BODY MASS INDEX (BMI) OF 35.0 TO 35.9 IN ADULT, UNSPECIFIED OBESITY TYPE (HCC): ICD-10-CM

## 2024-07-19 DIAGNOSIS — Z23 ENCOUNTER FOR IMMUNIZATION: ICD-10-CM

## 2024-07-19 DIAGNOSIS — R73.9 ELEVATED BLOOD SUGAR: ICD-10-CM

## 2024-07-19 DIAGNOSIS — E78.2 MIXED HYPERLIPIDEMIA: ICD-10-CM

## 2024-07-19 DIAGNOSIS — I50.32 CHRONIC DIASTOLIC HEART FAILURE (HCC): ICD-10-CM

## 2024-07-19 ASSESSMENT — ENCOUNTER SYMPTOMS
NAUSEA: 0
PHOTOPHOBIA: 1
DIARRHEA: 0
COUGH: 0
EYE REDNESS: 0
BACK PAIN: 1
VOMITING: 0
SHORTNESS OF BREATH: 0
ABDOMINAL PAIN: 0
BLOOD IN STOOL: 0
EYE PAIN: 0
RHINORRHEA: 0
CHEST TIGHTNESS: 0
WHEEZING: 0

## 2024-07-19 NOTE — PROGRESS NOTES
Medicare Annual Wellness Visit    Nunu Ibrahim is here for Medicare AWV    Assessment & Plan   Medicare annual wellness visit, subsequent  Encounter for immunization  -     Pneumococcal, PCV20, PREVNAR 20, (age 6w+), IM, PF  -     Tdap, BOOSTRIX, (age 10 yrs+), IM      Recommendations for Preventive Services Due: see orders and patient instructions/AVS.  Recommended screening schedule for the next 5-10 years is provided to the patient in written form: see Patient Instructions/AVS.     Return in about 6 months (around 1/19/2025).     Subjective       Patient's complete Health Risk Assessment and screening values have been reviewed and are found in Flowsheets. The following problems were reviewed today and where indicated follow up appointments were made and/or referrals ordered.    Positive Risk Factor Screenings with Interventions:                  Abnormal BMI (obese):  Body mass index is 35.33 kg/m². (!) Abnormal  Interventions:  See AVS for additional education material           Safety:  Do you have any tripping hazards - loose or unsecured carpets or rugs?: (!) Yes  Interventions:  Counseled on safety     Advanced Directives:  Do you have a Living Will?: (!) No    Intervention:  has NO advanced directive - information provided                     Objective   Vitals:    07/19/24 1140   BP: 118/73   Site: Right Upper Arm   Position: Sitting   Cuff Size: Large Adult   Pulse: 75   Resp: 16   Temp: 98.6 °F (37 °C)   TempSrc: Temporal   SpO2: 96%   Weight: 84.8 kg (187 lb)   Height: 1.549 m (5' 1\")      Body mass index is 35.33 kg/m².                  Allergies   Allergen Reactions    Nsaids Other (See Comments)     Severe abdominal pain    Penicillins Swelling     Swelling, hives & photosensitive rxn    Statins Myalgia and Other (See Comments)     Quit 2/17  Lag crumps       Prior to Visit Medications    Medication Sig Taking? Authorizing Provider   losartan (COZAAR) 100 MG tablet TAKE 1 TABLET BY MOUTH DAILY Yes 
\"Have you been to the ER, urgent care clinic since your last visit?  Hospitalized since your last visit?\"    NO    “Have you seen or consulted any other health care providers outside of [facility] since your last visit?”    YES - When: approximately 2 months ago.  Where and Why: Dr. Pedroza Cardiologist.        “Have you had a colorectal cancer screening such as a colonoscopy/FIT/Cologuard?    NO    Date of last Colonoscopy: 10/17/2012  No cologuard on file  No FIT/FOBT on file   No flexible sigmoidoscopy on file         Click Here for Release of Records Request   
always so far    Radiation therapy complication     RSD (reflex sympathetic dystrophy) 2009    no blood pressure or sticks in left arm    Syncope     no recurrence since; was orthostatic at that time    Unspecified adverse effect of anesthesia     hard to sedate       Past Surgical History:   Procedure Laterality Date    BREAST LUMPECTOMY      CARDIAC CATHETERIZATION   approx    normal coronaries per pt    CARPAL TUNNEL RELEASE      GYN      lap. fibroid removal    [facility] STEROTACTIC LOC BREAST BIOPSY RIGHT Right 10/26/2017    [facility] STEROTACTIC LOC BREAST BIOPSY RIGHT 10/26/2017 [facility]    ORTHOPEDIC SURGERY      ORIF left wrist    SHOULDER ARTHROSCOPY Left 1988       Social History     Socioeconomic History    Marital status:      Spouse name: Not on file    Number of children: Not on file    Years of education: Not on file    Highest education level: Not on file   Occupational History    Not on file   Tobacco Use    Smoking status: Former     Current packs/day: 0.00     Types: Cigarettes     Quit date: 2003     Years since quittin.5     Passive exposure: Past    Smokeless tobacco: Never   Substance and Sexual Activity    Alcohol use: Not Currently     Comment: a couple times per year    Drug use: No    Sexual activity: Defer   Other Topics Concern    Not on file   Social History Narrative    Not on file     Social Determinants of Health     Financial Resource Strain: Low Risk  (2023)    Overall Financial Resource Strain (CARDIA)     Difficulty of Paying Living Expenses: Not hard at all   Food Insecurity: Not on file (2023)   Transportation Needs: Unknown (2023)    PRAPARE - Transportation     Lack of Transportation (Medical): Not on file     Lack of Transportation (Non-Medical): No   Physical Activity: Insufficiently Active (2024)    Exercise Vital Sign     Days of Exercise per Week: 3 days     Minutes of Exercise per Session: 20 min   Stress: Not on file   Social

## 2024-09-16 ENCOUNTER — OFFICE VISIT (OUTPATIENT)
Age: 74
End: 2024-09-16
Payer: MEDICARE

## 2024-09-16 VITALS
SYSTOLIC BLOOD PRESSURE: 138 MMHG | OXYGEN SATURATION: 97 % | WEIGHT: 192 LBS | DIASTOLIC BLOOD PRESSURE: 79 MMHG | BODY MASS INDEX: 36.25 KG/M2 | HEIGHT: 61 IN | HEART RATE: 79 BPM

## 2024-09-16 DIAGNOSIS — E78.2 MIXED HYPERLIPIDEMIA: ICD-10-CM

## 2024-09-16 DIAGNOSIS — I50.32 CHRONIC DIASTOLIC CONGESTIVE HEART FAILURE (HCC): Primary | ICD-10-CM

## 2024-09-16 DIAGNOSIS — I10 ESSENTIAL (PRIMARY) HYPERTENSION: ICD-10-CM

## 2024-09-16 DIAGNOSIS — Z85.3 PERSONAL HISTORY OF BREAST CANCER: ICD-10-CM

## 2024-09-16 DIAGNOSIS — E66.01 SEVERE OBESITY (BMI 35.0-39.9) WITH COMORBIDITY (HCC): ICD-10-CM

## 2024-09-16 PROBLEM — E11.9 TYPE 2 DIABETES MELLITUS (HCC): Status: ACTIVE | Noted: 2024-09-16

## 2024-09-16 PROCEDURE — G8399 PT W/DXA RESULTS DOCUMENT: HCPCS | Performed by: INTERNAL MEDICINE

## 2024-09-16 PROCEDURE — G8417 CALC BMI ABV UP PARAM F/U: HCPCS | Performed by: INTERNAL MEDICINE

## 2024-09-16 PROCEDURE — 1090F PRES/ABSN URINE INCON ASSESS: CPT | Performed by: INTERNAL MEDICINE

## 2024-09-16 PROCEDURE — 3017F COLORECTAL CA SCREEN DOC REV: CPT | Performed by: INTERNAL MEDICINE

## 2024-09-16 PROCEDURE — 3078F DIAST BP <80 MM HG: CPT | Performed by: INTERNAL MEDICINE

## 2024-09-16 PROCEDURE — 3075F SYST BP GE 130 - 139MM HG: CPT | Performed by: INTERNAL MEDICINE

## 2024-09-16 PROCEDURE — 1123F ACP DISCUSS/DSCN MKR DOCD: CPT | Performed by: INTERNAL MEDICINE

## 2024-09-16 PROCEDURE — 99214 OFFICE O/P EST MOD 30 MIN: CPT | Performed by: INTERNAL MEDICINE

## 2024-09-16 PROCEDURE — 1036F TOBACCO NON-USER: CPT | Performed by: INTERNAL MEDICINE

## 2024-09-16 PROCEDURE — G8428 CUR MEDS NOT DOCUMENT: HCPCS | Performed by: INTERNAL MEDICINE

## 2024-09-16 RX ORDER — LOSARTAN POTASSIUM 100 MG/1
100 TABLET ORAL DAILY
Qty: 90 TABLET | Refills: 3 | Status: SHIPPED | OUTPATIENT
Start: 2024-09-16

## 2024-09-16 RX ORDER — SPIRONOLACTONE 25 MG/1
25 TABLET ORAL DAILY
Qty: 90 TABLET | Refills: 1 | Status: SHIPPED | OUTPATIENT
Start: 2024-09-16

## 2024-09-16 RX ORDER — DAPAGLIFLOZIN 10 MG/1
10 TABLET, FILM COATED ORAL EVERY MORNING
Qty: 90 TABLET | Refills: 1 | Status: SHIPPED | OUTPATIENT
Start: 2024-09-16

## 2024-09-16 RX ORDER — LABETALOL 200 MG/1
200 TABLET, FILM COATED ORAL 2 TIMES DAILY
Qty: 180 TABLET | Refills: 3 | Status: SHIPPED | OUTPATIENT
Start: 2024-09-16

## 2024-09-16 ASSESSMENT — ENCOUNTER SYMPTOMS
EYES NEGATIVE: 1
SHORTNESS OF BREATH: 1
GASTROINTESTINAL NEGATIVE: 1

## 2025-05-29 ENCOUNTER — COMMUNITY OUTREACH (OUTPATIENT)
Facility: CLINIC | Age: 75
End: 2025-05-29

## 2025-06-28 DIAGNOSIS — M54.50 CHRONIC MIDLINE LOW BACK PAIN WITHOUT SCIATICA: ICD-10-CM

## 2025-06-28 DIAGNOSIS — G62.9 NEUROPATHY: ICD-10-CM

## 2025-06-28 DIAGNOSIS — G89.29 CHRONIC MIDLINE LOW BACK PAIN WITHOUT SCIATICA: ICD-10-CM

## 2025-06-28 DIAGNOSIS — M81.0 AGE-RELATED OSTEOPOROSIS WITHOUT CURRENT PATHOLOGICAL FRACTURE: Primary | ICD-10-CM

## 2025-07-02 RX ORDER — GABAPENTIN 300 MG/1
300 CAPSULE ORAL 2 TIMES DAILY
Qty: 180 CAPSULE | Refills: 1 | Status: SHIPPED | OUTPATIENT
Start: 2025-07-02 | End: 2025-12-29

## 2025-07-02 RX ORDER — RALOXIFENE HYDROCHLORIDE 60 MG/1
60 TABLET, FILM COATED ORAL DAILY
Qty: 90 TABLET | Refills: 0 | Status: SHIPPED | OUTPATIENT
Start: 2025-07-02

## 2025-07-10 ENCOUNTER — OFFICE VISIT (OUTPATIENT)
Age: 75
End: 2025-07-10
Payer: MEDICARE

## 2025-07-10 VITALS
BODY MASS INDEX: 34.17 KG/M2 | HEIGHT: 61 IN | OXYGEN SATURATION: 97 % | DIASTOLIC BLOOD PRESSURE: 85 MMHG | WEIGHT: 181 LBS | SYSTOLIC BLOOD PRESSURE: 127 MMHG | HEART RATE: 84 BPM

## 2025-07-10 DIAGNOSIS — Z85.3 PERSONAL HISTORY OF BREAST CANCER: ICD-10-CM

## 2025-07-10 DIAGNOSIS — E66.811 OBESITY (BMI 30.0-34.9): ICD-10-CM

## 2025-07-10 DIAGNOSIS — I50.32 CHRONIC DIASTOLIC CONGESTIVE HEART FAILURE (HCC): ICD-10-CM

## 2025-07-10 DIAGNOSIS — E78.2 MIXED HYPERLIPIDEMIA: ICD-10-CM

## 2025-07-10 DIAGNOSIS — I10 ESSENTIAL (PRIMARY) HYPERTENSION: Primary | ICD-10-CM

## 2025-07-10 PROBLEM — E66.01 SEVERE OBESITY (BMI 35.0-39.9) WITH COMORBIDITY (HCC): Status: RESOLVED | Noted: 2023-05-18 | Resolved: 2025-07-10

## 2025-07-10 PROCEDURE — 3079F DIAST BP 80-89 MM HG: CPT | Performed by: INTERNAL MEDICINE

## 2025-07-10 PROCEDURE — 1090F PRES/ABSN URINE INCON ASSESS: CPT | Performed by: INTERNAL MEDICINE

## 2025-07-10 PROCEDURE — G8427 DOCREV CUR MEDS BY ELIG CLIN: HCPCS | Performed by: INTERNAL MEDICINE

## 2025-07-10 PROCEDURE — 1159F MED LIST DOCD IN RCRD: CPT | Performed by: INTERNAL MEDICINE

## 2025-07-10 PROCEDURE — G8399 PT W/DXA RESULTS DOCUMENT: HCPCS | Performed by: INTERNAL MEDICINE

## 2025-07-10 PROCEDURE — 1160F RVW MEDS BY RX/DR IN RCRD: CPT | Performed by: INTERNAL MEDICINE

## 2025-07-10 PROCEDURE — G8417 CALC BMI ABV UP PARAM F/U: HCPCS | Performed by: INTERNAL MEDICINE

## 2025-07-10 PROCEDURE — 1036F TOBACCO NON-USER: CPT | Performed by: INTERNAL MEDICINE

## 2025-07-10 PROCEDURE — 3017F COLORECTAL CA SCREEN DOC REV: CPT | Performed by: INTERNAL MEDICINE

## 2025-07-10 PROCEDURE — 3074F SYST BP LT 130 MM HG: CPT | Performed by: INTERNAL MEDICINE

## 2025-07-10 PROCEDURE — 93000 ELECTROCARDIOGRAM COMPLETE: CPT | Performed by: INTERNAL MEDICINE

## 2025-07-10 PROCEDURE — 99214 OFFICE O/P EST MOD 30 MIN: CPT | Performed by: INTERNAL MEDICINE

## 2025-07-10 PROCEDURE — 1123F ACP DISCUSS/DSCN MKR DOCD: CPT | Performed by: INTERNAL MEDICINE

## 2025-07-10 RX ORDER — SPIRONOLACTONE 25 MG/1
25 TABLET ORAL DAILY
Qty: 90 TABLET | Refills: 1 | Status: SHIPPED | OUTPATIENT
Start: 2025-07-10

## 2025-07-10 RX ORDER — LOSARTAN POTASSIUM 100 MG/1
100 TABLET ORAL DAILY
Qty: 90 TABLET | Refills: 3 | Status: SHIPPED | OUTPATIENT
Start: 2025-07-10

## 2025-07-10 RX ORDER — LABETALOL 200 MG/1
200 TABLET, FILM COATED ORAL 2 TIMES DAILY
Qty: 180 TABLET | Refills: 3 | Status: SHIPPED | OUTPATIENT
Start: 2025-07-10

## 2025-07-10 ASSESSMENT — ENCOUNTER SYMPTOMS
EYES NEGATIVE: 1
SHORTNESS OF BREATH: 1
GASTROINTESTINAL NEGATIVE: 1

## 2025-07-10 NOTE — PROGRESS NOTES
Nunu Ibrahim is a 74 y.o. year old female.    Follow-up of hypertension, CHF, hyperlipidemia, obesity  History of breast cancer      11/16 seen for abn EKG, h/o 45-50%EF; had jaw pain and left neck discomfort- worse with bending over  10/16 had SOB/edema- improved now. Had high BP which is being treated now. Was drinking extra fluids due to renal stone which has now been passed and will not need lithotripsy now  1/19 patient is able to tolerate Lipitor 10 mg once in 1-2 weeks only.  LDL has increased again.    5/19 patient tolerating Crestor once or twice a week.  LDL has shown improvement.  She also has right lower anterior chest discomfort which is positional and with breathing and is being worked up with bone scan.  5/18/2023 no chest pain, shortness of breath. Rare intermittent dyspnea and daily PM edema for which she takes Lasix few times a year.  She has an active lifestyle.  8/8/2023 started taking Crestor gradually and is now tolerating 10 mg almost 5 days a week.  Rare edema continues and she takes Lasix about once a month.  No chest pain or shortness of breath and staying active.  5/13/2024 BURKS sometimes specially up steps and pills..  Intermittent edema also noted as before.  Using Lasix intermittently.  Feels chest heaviness when she is dyspneic.  Taking Crestor 4-5 times a week.  9/16/2024 2 pillow orthopnea, BURKS 2 flights of steps. Chest tightness when she reaches third floor.  Intermittent edema and takes Lasix once in 2 weeks.  7/10/2025 BP at home is in 120s over 70s.  Usual diastolic BP is less than 80. SOB better. Only rare edema now. BURKS 1 flight with chest tightness but feels better than before.  Thinks that she can walk more than a few miles on a level ground.            Review of Systems   Constitutional: Negative.    HENT: Negative.     Eyes: Negative.    Respiratory:  Positive for shortness of breath.    Cardiovascular:  Positive for leg swelling.   Gastrointestinal: Negative.    Endocrine:

## 2025-07-10 NOTE — PROGRESS NOTES
1. Have you been to the ER, urgent care clinic since your last visit?  Hospitalized since your last visit?     no      2.  Where do you normally have your labs drawn?       3. Do you need any refills today?   Yes     4. Which local pharmacy do you use (enter pharmacy)?   [facility]    5. Which mail order pharmacy do you use (enter pharmacy)?        6. Are you here for surgical clearance and if so who will be doing your     procedure/surgery (care team)?   no

## 2025-07-22 ENCOUNTER — OFFICE VISIT (OUTPATIENT)
Facility: CLINIC | Age: 75
End: 2025-07-22

## 2025-07-22 ENCOUNTER — HOSPITAL ENCOUNTER (OUTPATIENT)
Facility: HOSPITAL | Age: 75
Discharge: HOME | End: 2025-07-25
Payer: MEDICARE

## 2025-07-22 VITALS
SYSTOLIC BLOOD PRESSURE: 136 MMHG | DIASTOLIC BLOOD PRESSURE: 84 MMHG | OXYGEN SATURATION: 97 % | WEIGHT: 178 LBS | BODY MASS INDEX: 33.61 KG/M2 | RESPIRATION RATE: 16 BRPM | HEART RATE: 79 BPM | HEIGHT: 61 IN | TEMPERATURE: 95.6 F

## 2025-07-22 DIAGNOSIS — I10 ESSENTIAL HYPERTENSION, MALIGNANT: ICD-10-CM

## 2025-07-22 DIAGNOSIS — Z00.00 MEDICARE ANNUAL WELLNESS VISIT, SUBSEQUENT: Primary | ICD-10-CM

## 2025-07-22 DIAGNOSIS — I50.32 CHRONIC DIASTOLIC HEART FAILURE (HCC): ICD-10-CM

## 2025-07-22 DIAGNOSIS — M79.2 NEURITIS: ICD-10-CM

## 2025-07-22 DIAGNOSIS — G62.9 NEUROPATHY: ICD-10-CM

## 2025-07-22 DIAGNOSIS — E78.2 MIXED HYPERLIPIDEMIA: ICD-10-CM

## 2025-07-22 DIAGNOSIS — Z13.29 SCREENING FOR ENDOCRINE DISORDER: ICD-10-CM

## 2025-07-22 DIAGNOSIS — I10 ESSENTIAL (PRIMARY) HYPERTENSION: ICD-10-CM

## 2025-07-22 DIAGNOSIS — M54.50 CHRONIC MIDLINE LOW BACK PAIN WITHOUT SCIATICA: ICD-10-CM

## 2025-07-22 DIAGNOSIS — Z12.31 BREAST CANCER SCREENING BY MAMMOGRAM: ICD-10-CM

## 2025-07-22 DIAGNOSIS — M81.0 SENILE OSTEOPOROSIS: ICD-10-CM

## 2025-07-22 DIAGNOSIS — M81.0 AGE-RELATED OSTEOPOROSIS WITHOUT CURRENT PATHOLOGICAL FRACTURE: ICD-10-CM

## 2025-07-22 DIAGNOSIS — G89.29 CHRONIC MIDLINE LOW BACK PAIN WITHOUT SCIATICA: ICD-10-CM

## 2025-07-22 DIAGNOSIS — Z13.29 SCREENING FOR THYROID DISORDER: ICD-10-CM

## 2025-07-22 DIAGNOSIS — K21.9 GASTROESOPHAGEAL REFLUX DISEASE WITHOUT ESOPHAGITIS: ICD-10-CM

## 2025-07-22 DIAGNOSIS — Z12.11 SCREENING FOR COLON CANCER: ICD-10-CM

## 2025-07-22 LAB
25(OH)D3 SERPL-MCNC: 46.7 NG/ML (ref 30–100)
ALBUMIN SERPL-MCNC: 4.1 G/DL (ref 3.4–5)
ALBUMIN/GLOB SERPL: 1.7 (ref 0.8–1.7)
ALP SERPL-CCNC: 77 U/L (ref 45–117)
ALT SERPL-CCNC: 24 U/L (ref 10–35)
ANION GAP SERPL CALC-SCNC: 15 MMOL/L (ref 3–18)
AST SERPL-CCNC: 28 U/L (ref 10–38)
BASOPHILS # BLD: 0.05 K/UL (ref 0–0.1)
BASOPHILS NFR BLD: 0.9 % (ref 0–2)
BILIRUB SERPL-MCNC: 0.4 MG/DL (ref 0.2–1)
BUN SERPL-MCNC: 19 MG/DL (ref 6–23)
BUN/CREAT SERPL: 19 (ref 12–20)
CALCIUM SERPL-MCNC: 10.4 MG/DL (ref 8.5–10.1)
CHLORIDE SERPL-SCNC: 105 MMOL/L (ref 98–107)
CHOLEST SERPL-MCNC: 193 MG/DL
CO2 SERPL-SCNC: 23 MMOL/L (ref 21–32)
CREAT SERPL-MCNC: 1.01 MG/DL (ref 0.6–1.3)
DIFFERENTIAL METHOD BLD: ABNORMAL
EOSINOPHIL # BLD: 0.1 K/UL (ref 0–0.4)
EOSINOPHIL NFR BLD: 1.8 % (ref 0–5)
ERYTHROCYTE [DISTWIDTH] IN BLOOD BY AUTOMATED COUNT: 13.8 % (ref 11.6–14.5)
EST. AVERAGE GLUCOSE BLD GHB EST-MCNC: 119 MG/DL
GLOBULIN SER CALC-MCNC: 2.4 G/DL (ref 2–4)
GLUCOSE SERPL-MCNC: 77 MG/DL (ref 74–108)
HBA1C MFR BLD: 5.8 % (ref 4.2–5.6)
HCT VFR BLD AUTO: 38.2 % (ref 35–45)
HDLC SERPL-MCNC: 63 MG/DL (ref 40–60)
HDLC SERPL: 3 (ref 0–5)
HGB BLD-MCNC: 12.7 G/DL (ref 12–16)
IMM GRANULOCYTES # BLD AUTO: 0.02 K/UL (ref 0–0.04)
IMM GRANULOCYTES NFR BLD AUTO: 0.4 % (ref 0–0.5)
LDLC SERPL CALC-MCNC: 107 MG/DL (ref 0–100)
LYMPHOCYTES # BLD: 1.09 K/UL (ref 0.9–3.6)
LYMPHOCYTES NFR BLD: 19.6 % (ref 21–52)
MCH RBC QN AUTO: 30.8 PG (ref 24–34)
MCHC RBC AUTO-ENTMCNC: 33.2 G/DL (ref 31–37)
MCV RBC AUTO: 92.7 FL (ref 78–100)
MONOCYTES # BLD: 0.46 K/UL (ref 0.05–1.2)
MONOCYTES NFR BLD: 8.3 % (ref 3–10)
NEUTS SEG # BLD: 3.84 K/UL (ref 1.8–8)
NEUTS SEG NFR BLD: 69 % (ref 40–73)
NRBC # BLD: 0 K/UL (ref 0–0.01)
NRBC BLD-RTO: 0 PER 100 WBC
PLATELET # BLD AUTO: 225 K/UL (ref 135–420)
PMV BLD AUTO: 10.4 FL (ref 9.2–11.8)
POTASSIUM SERPL-SCNC: 4.4 MMOL/L (ref 3.5–5.5)
PROT SERPL-MCNC: 6.5 G/DL (ref 6.4–8.2)
RBC # BLD AUTO: 4.12 M/UL (ref 4.2–5.3)
SODIUM SERPL-SCNC: 143 MMOL/L (ref 136–145)
TRIGL SERPL-MCNC: 113 MG/DL (ref 0–150)
TSH, 3RD GENERATION: 2.03 UIU/ML (ref 0.27–4.2)
VLDLC SERPL CALC-MCNC: 23 MG/DL
WBC # BLD AUTO: 5.6 K/UL (ref 4.6–13.2)

## 2025-07-22 PROCEDURE — 82306 VITAMIN D 25 HYDROXY: CPT

## 2025-07-22 PROCEDURE — 85025 COMPLETE CBC W/AUTO DIFF WBC: CPT

## 2025-07-22 PROCEDURE — 84443 ASSAY THYROID STIM HORMONE: CPT

## 2025-07-22 PROCEDURE — 83036 HEMOGLOBIN GLYCOSYLATED A1C: CPT

## 2025-07-22 PROCEDURE — 80053 COMPREHEN METABOLIC PANEL: CPT

## 2025-07-22 PROCEDURE — 36415 COLL VENOUS BLD VENIPUNCTURE: CPT

## 2025-07-22 PROCEDURE — 80061 LIPID PANEL: CPT

## 2025-07-22 RX ORDER — RALOXIFENE HYDROCHLORIDE 60 MG/1
60 TABLET, FILM COATED ORAL DAILY
Qty: 90 TABLET | Refills: 0 | Status: CANCELLED | OUTPATIENT
Start: 2025-07-22

## 2025-07-22 SDOH — ECONOMIC STABILITY: FOOD INSECURITY: WITHIN THE PAST 12 MONTHS, THE FOOD YOU BOUGHT JUST DIDN'T LAST AND YOU DIDN'T HAVE MONEY TO GET MORE.: NEVER TRUE

## 2025-07-22 SDOH — ECONOMIC STABILITY: FOOD INSECURITY: WITHIN THE PAST 12 MONTHS, YOU WORRIED THAT YOUR FOOD WOULD RUN OUT BEFORE YOU GOT MONEY TO BUY MORE.: NEVER TRUE

## 2025-07-22 ASSESSMENT — ENCOUNTER SYMPTOMS
RHINORRHEA: 0
EYE REDNESS: 0
ABDOMINAL PAIN: 0
BACK PAIN: 1
PHOTOPHOBIA: 1
COUGH: 0
BLOOD IN STOOL: 0
DIARRHEA: 0
VOMITING: 0
SHORTNESS OF BREATH: 0
NAUSEA: 0
EYE PAIN: 0
WHEEZING: 0
CHEST TIGHTNESS: 0

## 2025-07-22 ASSESSMENT — PATIENT HEALTH QUESTIONNAIRE - PHQ9
SUM OF ALL RESPONSES TO PHQ QUESTIONS 1-9: 0
1. LITTLE INTEREST OR PLEASURE IN DOING THINGS: NOT AT ALL
SUM OF ALL RESPONSES TO PHQ QUESTIONS 1-9: 0
2. FEELING DOWN, DEPRESSED OR HOPELESS: NOT AT ALL

## 2025-07-22 ASSESSMENT — LIFESTYLE VARIABLES
HOW MANY STANDARD DRINKS CONTAINING ALCOHOL DO YOU HAVE ON A TYPICAL DAY: 1 OR 2
HOW OFTEN DO YOU HAVE A DRINK CONTAINING ALCOHOL: MONTHLY OR LESS

## 2025-07-22 NOTE — PROGRESS NOTES
Medicare Annual Wellness Visit    Nunu Ibrahim is here for Medicare AWV    Assessment & Plan   Medicare annual wellness visit, subsequent       Return in about 6 months (around 1/22/2026).     Subjective       Patient's complete Health Risk Assessment and screening values have been reviewed and are found in Flowsheets. The following problems were reviewed today and where indicated follow up appointments were made and/or referrals ordered.    Positive Risk Factor Screenings with Interventions:                Abnormal BMI (obese):  Body mass index is 33.63 kg/m². (!) Abnormal  Interventions:  See AVS for additional education material                            Objective   Vitals:    07/22/25 0744   BP: 136/84   BP Site: Left Upper Arm   Patient Position: Sitting   BP Cuff Size: Large Adult   Pulse: 79   Resp: 16   Temp: (!) 95.6 °F (35.3 °C)   TempSrc: Oral   SpO2: 97%   Weight: 80.7 kg (178 lb)   Height: 1.549 m (5' 1\")      Body mass index is 33.63 kg/m².                  Allergies   Allergen Reactions    Nsaids Other (See Comments)     Severe abdominal pain    Penicillins Swelling     Swelling, hives & photosensitive rxn    Statins Myalgia and Other (See Comments)     Quit 2/17  Lag crumps       Prior to Visit Medications    Medication Sig Taking? Authorizing Provider   diclofenac sodium (VOLTAREN) 1 % GEL Apply 4 g topically 4 times daily as needed for Pain Yes Shobha Henderson MD   labetalol (NORMODYNE) 200 MG tablet Take 1 tablet by mouth 2 times daily Yes Mariah Ayala MD   losartan (COZAAR) 100 MG tablet Take 1 tablet by mouth daily Yes Mariah Ayala MD   spironolactone (ALDACTONE) 25 MG tablet Take 1 tablet by mouth daily Yes Mariah Ayala MD   Coenzyme Q10 400 MG CAPS Take 1 capsule by mouth daily Yes Mariah Ayala MD   gabapentin (NEURONTIN) 300 MG capsule Take 1 capsule by mouth 2 times daily for 180 days. Intended supply: 90 days Max Daily Amount: 600 mg Yes Shobha Henderson MD 
normal.         Thought Content: Thought content normal.         Judgment: Judgment normal.          No visits with results within 3 Month(s) from this visit.   Latest known visit with results is:   Abstract on 03/19/2024   Component Date Value Ref Range Status    Left Ventricular Ejection Fraction* 07/17/2023 39   Final       No results found for any visits on 07/22/25.    Patient Care Team:  Patient Care Team:  Shobha Henderson MD as PCP - General  [facility]ob[facility] Henderson [facility]  Jens White MD as Physician  Brenton Chávez MD (Ophthalmology)  Brenton Chávez MD as Consulting Physician (Ophthalmology)  Peewee Ba MD (Endocrinology)      Assessment / Plan:     Diagnosis Orders   1. Neuritis  diclofenac sodium (VOLTAREN) 1 % GEL      2. Age-related osteoporosis without current pathological fracture  TSH    Vitamin D 25 Hydroxy      3. Neuropathy        4. Essential (primary) hypertension  TSH      5. Chronic diastolic heart failure (HCC)        6. Mixed hyperlipidemia  CBC with Auto Differential    Comprehensive Metabolic Panel    Lipid Panel      7. Chronic midline low back pain without sciatica        8. Gastroesophageal reflux disease without esophagitis        9. Breast cancer screening by mammogram  [facility]ANI DIGITAL SCREEN W OR WO CAD BILATERAL      10. Screening for endocrine disorder  Hemoglobin A1C      11. Screening for colon cancer  Cologuard (Fecal DNA Colorectal Cancer Screening)        Neuritis: Continue gabapentin.  Will prescribe topical diclofenac    Hypertension: BP is well controlled on losartan and labetalol.     HLD: Continue Crestor.  Check repeat lipid panel     CHF:   Spironolactone has been added.  Reports improvement in shortness of breath and dyspnea.  No significant LE swelling.  Following with cardiology.      Sacroiliac dysfunction: Patient is following with Dr. Orourke.  Continue gabapentin.     Osteoporosis: Patient is following with endocrinology.  She

## 2025-08-13 LAB — NONINV COLON CA DNA+OCC BLD SCRN STL QL: NEGATIVE

## (undated) DEVICE — INTENDED FOR TISSUE SEPARATION, AND OTHER PROCEDURES THAT REQUIRE A SHARP SURGICAL BLADE TO PUNCTURE OR CUT.: Brand: BARD-PARKER SAFETY BLADES SIZE 15, STERILE

## (undated) DEVICE — STERILE POLYISOPRENE POWDER-FREE SURGICAL GLOVES: Brand: PROTEXIS

## (undated) DEVICE — (D)SYR 10ML 1/5ML GRAD NSAF -- PKGING CHANGE USE ITEM 338027

## (undated) DEVICE — 3M™ BAIR PAWS FLEX™ WARMING GOWN, STANDARD, 20 PER CASE 81003: Brand: BAIR PAWS™

## (undated) DEVICE — SUTURE MCRYL SZ 4-0 L18IN ABSRB UD L19MM PS-2 3/8 CIR PRIM Y496G

## (undated) DEVICE — FLEX ADVANTAGE 3000CC: Brand: FLEX ADVANTAGE

## (undated) DEVICE — MEDI-VAC NON-CONDUCTIVE SUCTION TUBING: Brand: CARDINAL HEALTH

## (undated) DEVICE — (D)PACK ICE DISP -- DISC BY MFR

## (undated) DEVICE — NEEDLE HYPO 25GA L1.5IN BVL ORIENTED ECLIPSE

## (undated) DEVICE — TRAY PREP DRY W/ PREM GLV 2 APPL 6 SPNG 2 UNDPD 1 OVERWRAP

## (undated) DEVICE — REM POLYHESIVE ADULT PATIENT RETURN ELECTRODE: Brand: VALLEYLAB

## (undated) DEVICE — KENDALL SCD EXPRESS SLEEVES, KNEE LENGTH, MEDIUM: Brand: KENDALL SCD

## (undated) DEVICE — Device

## (undated) DEVICE — 3M™ STERI-STRIP™ COMPOUND BENZOIN TINCTURE 40 BAGS/CARTON 4 CARTONS/CASE C1544: Brand: 3M™ STERI-STRIP™

## (undated) DEVICE — STAPLER SKIN 35CT WD STRL DISP -- MULTIFIRE PREMIUM

## (undated) DEVICE — SUTURE PERMA HND SZ 2 0 L18IN NONABSORBABLE BLK L19MM PS 2 583H

## (undated) DEVICE — AIRLIFE™ NASAL OXYGEN CANNULA CURVED, NONFLARED TIP WITH 14 FOOT (4.3 M) CRUSH-RESISTANT TUBING, OVER-THE-EAR STYLE: Brand: AIRLIFE™

## (undated) DEVICE — KIT CLN UP BON SECOURS MARYV

## (undated) DEVICE — CATH IV SAFE STR 22GX1IN BLU -- PROTECTIV PLUS

## (undated) DEVICE — 3M™ WARMING BLANKET, LOWER BODY, 10 PER CASE, 42568: Brand: BAIR HUGGER™

## (undated) DEVICE — BOUGIE ESOPH MLNY 54 FR TAPR TIP TUNGSTEN FILLED SIL NS

## (undated) DEVICE — (D)STRIP SKN CLSR 0.5X4IN WHT --

## (undated) DEVICE — PACK PROCEDURE SURG MAJ W/ BASIN LF

## (undated) DEVICE — SYRINGE MED 3ML NDL 22GA L1IN PLAS N CTRL LUERLOCK TIP REG

## (undated) DEVICE — SUTURE VCRL SZ 2-0 L12X18IN ABSRB UD POLYGLACTIN 910 BRAID J911T

## (undated) DEVICE — 48" PROBE COVER W/GEL, ULTRASOUND, STERILE: Brand: SITE-RITE

## (undated) DEVICE — DRAPE,CHEST,FENES,15X10,STERIL: Brand: MEDLINE

## (undated) DEVICE — SUTURE VCRL SZ 3-0 L27IN ABSRB UD L26MM SH 1/2 CIR J416H